# Patient Record
Sex: MALE | Race: WHITE | NOT HISPANIC OR LATINO | Employment: OTHER | URBAN - METROPOLITAN AREA
[De-identification: names, ages, dates, MRNs, and addresses within clinical notes are randomized per-mention and may not be internally consistent; named-entity substitution may affect disease eponyms.]

---

## 2018-11-26 ENCOUNTER — CONVERSION ENCOUNTER (OUTPATIENT)
Dept: PODIATRY | Facility: CLINIC | Age: 58
End: 2018-11-26

## 2018-11-26 ENCOUNTER — OFFICE VISIT CONVERTED (OUTPATIENT)
Dept: PODIATRY | Facility: CLINIC | Age: 58
End: 2018-11-26
Attending: PODIATRIST

## 2019-01-28 ENCOUNTER — PROCEDURE VISIT CONVERTED (OUTPATIENT)
Dept: PODIATRY | Facility: CLINIC | Age: 59
End: 2019-01-28
Attending: PODIATRIST

## 2019-02-11 ENCOUNTER — HOSPITAL ENCOUNTER (OUTPATIENT)
Dept: CT IMAGING | Facility: HOSPITAL | Age: 59
Discharge: HOME | End: 2019-02-11

## 2019-02-11 LAB
CREAT BLD-MCNC: 1.2 MG/DL (ref 0.6–1.4)
GFR SERPLBLD BASED ON 1.73 SQ M-ARVRAT: >60 ML/MIN/{1.73_M2}

## 2019-04-25 ENCOUNTER — HOSPITAL ENCOUNTER (OUTPATIENT)
Dept: CT IMAGING | Facility: HOSPITAL | Age: 59
Discharge: HOME | End: 2019-04-25

## 2019-04-25 LAB
CREAT BLD-MCNC: 0.9 MG/DL (ref 0.6–1.4)
GFR SERPLBLD BASED ON 1.73 SQ M-ARVRAT: >60 ML/MIN/{1.73_M2}

## 2019-04-29 ENCOUNTER — PROCEDURE VISIT CONVERTED (OUTPATIENT)
Dept: PODIATRY | Facility: CLINIC | Age: 59
End: 2019-04-29
Attending: PODIATRIST

## 2019-07-22 ENCOUNTER — PROCEDURE VISIT CONVERTED (OUTPATIENT)
Dept: PODIATRY | Facility: CLINIC | Age: 59
End: 2019-07-22
Attending: PODIATRIST

## 2019-11-11 ENCOUNTER — PROCEDURE VISIT CONVERTED (OUTPATIENT)
Dept: PODIATRY | Facility: CLINIC | Age: 59
End: 2019-11-11
Attending: PODIATRIST

## 2019-11-11 ENCOUNTER — CONVERSION ENCOUNTER (OUTPATIENT)
Dept: PODIATRY | Facility: CLINIC | Age: 59
End: 2019-11-11

## 2020-07-01 ENCOUNTER — PROCEDURE VISIT CONVERTED (OUTPATIENT)
Dept: PODIATRY | Facility: CLINIC | Age: 60
End: 2020-07-01
Attending: PODIATRIST

## 2020-11-10 ENCOUNTER — PROCEDURE VISIT CONVERTED (OUTPATIENT)
Dept: PODIATRY | Facility: CLINIC | Age: 60
End: 2020-11-10
Attending: PODIATRIST

## 2021-02-03 ENCOUNTER — HOSPITAL ENCOUNTER (OUTPATIENT)
Dept: OTHER | Facility: HOSPITAL | Age: 61
Discharge: HOME | End: 2021-02-03
Attending: FAMILY MEDICINE

## 2021-02-03 LAB
ALBUMIN SERPL-MCNC: 3.4 G/DL (ref 3.5–5)
ALBUMIN/GLOB SERPL: 0.9 {RATIO} (ref 1.4–2.6)
ALP SERPL-CCNC: 52 U/L (ref 56–119)
ALT SERPL-CCNC: 10 U/L (ref 10–40)
ANION GAP SERPL CALC-SCNC: 15 MMOL/L (ref 8–19)
AST SERPL-CCNC: 13 U/L (ref 15–50)
BASOPHILS # BLD AUTO: 0.03 10*3/UL (ref 0–0.2)
BASOPHILS NFR BLD AUTO: 0.4 % (ref 0–3)
BILIRUB SERPL-MCNC: 0.24 MG/DL (ref 0.2–1.3)
BUN SERPL-MCNC: 11 MG/DL (ref 5–25)
BUN/CREAT SERPL: 12 {RATIO} (ref 6–20)
CALCIUM SERPL-MCNC: 9.7 MG/DL (ref 8.7–10.4)
CHLORIDE SERPL-SCNC: 101 MMOL/L (ref 99–111)
CHOLEST SERPL-MCNC: 148 MG/DL (ref 107–200)
CHOLEST/HDLC SERPL: 4.6 {RATIO} (ref 3–6)
CONV ABS IMM GRAN: 0.04 10*3/UL (ref 0–0.2)
CONV CO2: 24 MMOL/L (ref 22–32)
CONV IMMATURE GRAN: 0.5 % (ref 0–1.8)
CONV TOTAL PROTEIN: 7 G/DL (ref 6.3–8.2)
CREAT UR-MCNC: 0.91 MG/DL (ref 0.7–1.2)
DEPRECATED RDW RBC AUTO: 54.9 FL (ref 35.1–43.9)
EOSINOPHIL # BLD AUTO: 0.2 10*3/UL (ref 0–0.7)
EOSINOPHIL # BLD AUTO: 2.7 % (ref 0–7)
ERYTHROCYTE [DISTWIDTH] IN BLOOD BY AUTOMATED COUNT: 16.2 % (ref 11.6–14.4)
EST. AVERAGE GLUCOSE BLD GHB EST-MCNC: 148 MG/DL
GFR SERPLBLD BASED ON 1.73 SQ M-ARVRAT: >60 ML/MIN/{1.73_M2}
GLOBULIN UR ELPH-MCNC: 3.6 G/DL (ref 2–3.5)
GLUCOSE SERPL-MCNC: 147 MG/DL (ref 70–99)
HBA1C MFR BLD: 6.8 % (ref 3.5–5.7)
HCT VFR BLD AUTO: 41.1 % (ref 42–52)
HDLC SERPL-MCNC: 32 MG/DL (ref 40–60)
HGB BLD-MCNC: 12.8 G/DL (ref 14–18)
LDLC SERPL CALC-MCNC: 99 MG/DL (ref 70–100)
LYMPHOCYTES # BLD AUTO: 0.71 10*3/UL (ref 1–5)
LYMPHOCYTES NFR BLD AUTO: 9.6 % (ref 20–45)
MCH RBC QN AUTO: 28.5 PG (ref 27–31)
MCHC RBC AUTO-ENTMCNC: 31.1 G/DL (ref 33–37)
MCV RBC AUTO: 91.5 FL (ref 80–96)
MONOCYTES # BLD AUTO: 0.4 10*3/UL (ref 0.2–1.2)
MONOCYTES NFR BLD AUTO: 5.4 % (ref 3–10)
NEUTROPHILS # BLD AUTO: 5.98 10*3/UL (ref 2–8)
NEUTROPHILS NFR BLD AUTO: 81.4 % (ref 30–85)
NRBC CBCN: 0 % (ref 0–0.7)
OSMOLALITY SERPL CALC.SUM OF ELEC: 284 MOSM/KG (ref 273–304)
PLATELET # BLD AUTO: 368 10*3/UL (ref 130–400)
PMV BLD AUTO: 10.1 FL (ref 9.4–12.4)
POTASSIUM SERPL-SCNC: 4.3 MMOL/L (ref 3.5–5.3)
RBC # BLD AUTO: 4.49 10*6/UL (ref 4.7–6.1)
SODIUM SERPL-SCNC: 136 MMOL/L (ref 135–147)
TRIGL SERPL-MCNC: 86 MG/DL (ref 40–150)
URATE SERPL-MCNC: 5 MG/DL (ref 3.5–8.5)
VLDLC SERPL-MCNC: 17 MG/DL (ref 5–37)
WBC # BLD AUTO: 7.36 10*3/UL (ref 4.8–10.8)

## 2021-02-05 LAB
AMPICILLIN SUSC ISLT: <=2
AMPICILLIN+SULBAC SUSC ISLT: <=2
BACTERIA SPEC AEROBE CULT: ABNORMAL
CEFAZOLIN SUSC ISLT: <=4
CEFEPIME SUSC ISLT: <=0.12
CEFTAZIDIME SUSC ISLT: <=1
CEFTRIAXONE SUSC ISLT: <=0.25
CIPROFLOXACIN SUSC ISLT: <=0.25
CIPROFLOXACIN SUSC ISLT: <=0.5
CLINDAMYCIN SUSC ISLT: 0.25
DAPTOMYCIN SUSC ISLT: 0.25
DOXYCYCLINE SUSC ISLT: <=0.5
ERTAPENEM SUSC ISLT: <=0.12
ERYTHROMYCIN SUSC ISLT: <=0.25
GENTAMICIN SUSC ISLT: <=0.5
GENTAMICIN SUSC ISLT: <=1
LEVOFLOXACIN SUSC ISLT: 0.25
LEVOFLOXACIN SUSC ISLT: <=0.12
OXACILLIN SUSC ISLT: 0.5
PIP+TAZO SUSC ISLT: <=4
RIFAMPIN SUSC ISLT: <=0.5
TETRACYCLINE SUSC ISLT: <=1
TIGECYCLINE SUSC ISLT: <=0.12
TMP SMX SUSC ISLT: <=10
TMP SMX SUSC ISLT: <=20
TOBRAMYCIN SUSC ISLT: <=1
VANCOMYCIN SUSC ISLT: 1

## 2021-05-13 NOTE — PROGRESS NOTES
Progress Note      Patient Name: Niall Mak   Patient ID: [id number]   Sex: Male   YOB: 1960    Primary Care Provider: Jb Booth MD   Referring Provider: Jb Booth MD    Visit Date: November 10, 2020    Provider: Des Ahumada DPM   Location: [facility]   Location Address: [address] [place] [address]  [id number]   Location Phone: [phone]          Chief Complaint  · Routine Foot Care Visit      History Of Present Illness  Niall Mak complains of painful, elongated toenails which are thickened, yellowed, chalky, and cause pain with shoe gear and ambulation.      New, Established, New Problem:  Established   Location:  Toenails  Duration:   Greater than five years  Onset:  Gradual  Nature:  Sore with palpation.  Stable, worsening, improving:   stable  Aggravating factors:  Pain with shoe gear and ambulation.  Previous Treatment:  Debridement    Patient denies any fevers, chills, nausea, vomiting, shortness of breath or any other constitutional signs nor symptoms.      Pt states their most recent blood glucose reading was 175.    Patient reports the following medical changes since their last visit:    - neuropathy changes in feet and hands.       Past Medical History  Arthritis; Atrial fibrillation; COPD (chronic obstructive pulmonary disease); Diabetes type 2, controlled; Foot pain, left; Foot pain, right; Gout; Ingrowing nail; Kidney problem; Morbid obesity; Numbness in feet; Tinea unguium; Ulcer Of Foot         Past Surgical History  *Denies any surgical procedures         Medication List  allopurinol 300 mg oral tablet; cetirizine 10 mg oral tablet; Cinnamon 500 mg oral capsule; diltiazem HCl 180 mg oral capsule,extended release 24 hr; gabapentin 800 mg oral tablet; glimepiride 4 mg oral tablet; metformin 500 mg oral tablet; metoprolol tartrate 100 mg oral tablet; potassium chloride 10 mEq oral capsule, extended release; spironolactone 25 mg oral  "tablet; Ventolin HFA 90 mcg/actuation inhalation HFA aerosol inhaler; Vitamin C 500 mg oral capsule, extended release; Xarelto 20 mg oral tablet         Allergy List  Januvia; [place]         Family Medical History  Heart Disease         Social History  Alcohol; Tobacco (Never)         Review of Systems  · Constitutional  o Denies  o : fatigue, night sweats  · Eyes  o Denies  o : double vision, blurred vision  · HENT  o Denies  o : vertigo, recent head injury  · Cardiovascular  o Denies  o : chest pain, irregular heart beats  · Respiratory  o Denies  o : shortness of breath, productive cough  · Gastrointestinal  o Denies  o : nausea, vomiting  · Genitourinary  o Denies  o : dysuria, urinary retention  · Integument  o * See HPI  · Neurologic  o Denies  o : altered mental status, seizures  · Musculoskeletal  o Denies  o : joint swelling, limitation of motion  · Endocrine  o Denies  o : cold intolerance, heat intolerance  · Heme-Lymph  o Denies  o : petechiae, lymph node enlargement or tenderness  · Allergic-Immunologic  o Denies  o : frequent illnesses      Vitals  Date Time BP Position Site L\R Cuff Size HR RR TEMP (F) WT  HT  BMI kg/m2 BSA m2 O2 Sat FR L/min FiO2 HC       11/10/2020 03:50 /78 Sitting    99 - R  96.2 528lbs 0oz 6'  6\" 61.02 3.63 95 %      11/10/2020 03:50 /71 Sitting                       Physical Examination  · Constitutional  o Appearance  o : morbidly obese, large body habitus, no obvious deformities present  · Eyes  o Vision  o : Patient wearing glasses.   · Respiratory  o Respiratory Effort  o : No labored breathing. Good respiratory effort.   · Cardiovascular  o Peripheral Vascular System  o :   § Pedal Pulses  § : Pedal Pulses are 2+ and symmetrical  § Extremities  § : There is no edema of the lower extremities  · Musculoskeletal  o Extremeties/Joint  o : Lower extremity muscle strength and range of motion is equal and symmetrical bilaterally. The knees are noted to be in normal " alignment. Ankle alignment and range of motion is normal and foot structure is normal.  · Skin and Subcutaneous Tissue  o General Inspection  o : no lesions present, no areas of discoloration, skin turgor normal, texture normal  · Neurologic  o Sensation  o : Sharp/dull sensation is diminished bilaterally. Monofilament sensation examination of the left foot is diminished. Monofilament sensation examination of the right foot is diminished.  · Toes  o Toes: Right Foot  o :   § Toenails  § : Toenails are hypertrophic, mycotic, dystrophic, brittle toenail(s) at nail 1, 2, 3, 4, 5 with onycholysis of the right foot. The 1st, 2nd, 3rd, 4th, 5th toenail(s) on the right have 2 mm in thickness with subungual detritus. There is an incurvated toenail at the distal border of the 1st, 2nd, 3rd, 4th, 5th toe  o Toes: Left Foot  o :   § Toenails  § : There are hypertrophic, mycotic, dystrophic, brittle toenail(s) at the 1, 2, 3, 4, 5 with onycholysis of the left foot. The 1st, 2nd, 3rd, 4th, 5th toenail(s) on the left have 2 mm in thickness with subungual detritus. There is an incurvated toenail at the distal border of the 1st, 2nd, 3rd, 4th, 5th toe  · Procedures  o Nail Debridement  o : Nail debridement is indicated for the following toenails:, left hallux, left 2nd toe, left 3rd toe, left 4th toe, left 5th toe, right hallux, right 2nd toe, right 3rd toe, right 4th toe, right 5th toe. The nail was debrided of excessive thickness to appropriate levels of comfort and contour using, nail nippers. The procedure was without complications          Assessment  · Foot pain, left     729.5/M79.672  · Foot pain, right     729.5/M79.671  · Ingrowing nail     703.0/L60.0  · Tinea unguium     110.1/B35.1  · Morbid obesity     278.01/E66.01  · Numbness in feet     782.0/R20.0      Plan  · Orders  o Debridement of six or more nails (96021, 82966, 87926, 19105, 96447, 82308) - 729.5/M79.672, 729.5/M79.671, 110.1/B35.1, 278.01/E66.01 -  11/10/2020  o Diabetic Foot (Motor and Sensory) Exam Completed [facility] (, , 2028F) - - 11/10/2020  · Medications  o Medications have been Reconciled  o Transition of Care or Provider Policy  · Instructions  o I have discussed the findings of this evaluation with the patient. The discussion included a complete verbal explanation of any changes in the examination results, diagnosis, and the current treatment plan. A schedule for future care needs was explained. If any questions should arise after returning home, I have encouraged the patient to feel free to contact Dr. Ahumada. The patient states understanding and agreement with this plan.   o Patient is to monitor for problems and to contact Dr. Ahumada for follow-up should such signs occur. Patient states understanding and agreement with this plan.   o Encouraged to follow-up with Primary Care Provider for preventative care.   o Follow up in 9 weeks for Routine Foot Care.   o Electronically Identified Patient Education Materials Provided Electronically  · Disposition  o Call or Return if symptoms worsen or persist.            Electronically Signed by: Des Ahumada DPM -Author on November 10, 2020 04:04:57 PM

## 2021-05-13 NOTE — PROGRESS NOTES
Progress Note      Patient Name: Niall Mak   Patient ID: [id number]   Sex: Male   YOB: 1960    Primary Care Provider: Jb Booth MD   Referring Provider: Jb Booth MD    Visit Date: July 1, 2020    Provider: Des Ahumada DPM   Location: [facility]   Location Address: [address] [place] [address]  [id number]   Location Phone: [phone]          Chief Complaint  · Routine Foot Care Visit      History Of Present Illness  Niall Mak complains of painful, elongated toenails which are thickened, yellowed, chalky, and cause pain with shoe gear and ambulation.      New, Established, New Problem:  Established   Location:  Toenails  Duration:   Greater than five years  Onset:  Gradual  Nature:  Sore with palpation.  Stable, worsening, improving:   stable  Aggravating factors:  Pain with shoe gear and ambulation.  Previous Treatment:  Debridement    Patient denies any fevers, chills, nausea, vomiting, shortness of breath or any other constitutional signs nor symptoms.      Patient reports the following medical changes since their last visit:    - lost 100 lbs in the past 19 months due to changes in diet.    Pt states their most recent HgB A1C was 7.1.       Past Medical History  Arthritis; Atrial fibrillation; COPD (chronic obstructive pulmonary disease); Diabetes type 2, controlled; Foot pain, left; Foot pain, right; Gout; Ingrowing nail; Kidney problem; Morbid obesity; Numbness in feet; Tinea unguium; Ulcer Of Foot         Past Surgical History  *Denies any surgical procedures         Medication List  allopurinol 300 mg oral tablet; cetirizine 10 mg oral tablet; Cinnamon 500 mg oral capsule; diltiazem HCl 180 mg oral capsule,extended release 24 hr; gabapentin 800 mg oral tablet; glimepiride 4 mg oral tablet; metformin 500 mg oral tablet; metoprolol tartrate 100 mg oral tablet; potassium chloride 10 mEq oral capsule, extended release;  "spironolactone 25 mg oral tablet; Ventolin HFA 90 mcg/actuation inhalation HFA aerosol inhaler; Vitamin C 500 mg oral capsule, extended release; Xarelto 20 mg oral tablet         Allergy List  Januvia; Strawberry       Allergies Reconciled  Family Medical History  Heart Disease         Social History  Alcohol; Tobacco (Never)         Review of Systems  · Constitutional  o Denies  o : fatigue, night sweats  · Eyes  o Denies  o : double vision, blurred vision  · HENT  o Denies  o : vertigo, recent head injury  · Cardiovascular  o Denies  o : chest pain, irregular heart beats  · Respiratory  o Denies  o : shortness of breath, productive cough  · Gastrointestinal  o Denies  o : nausea, vomiting  · Genitourinary  o Denies  o : dysuria, urinary retention  · Integument  o * See HPI  · Neurologic  o Denies  o : altered mental status, seizures  · Musculoskeletal  o Denies  o : joint swelling, limitation of motion  · Endocrine  o Denies  o : cold intolerance, heat intolerance  · Heme-Lymph  o Denies  o : petechiae, lymph node enlargement or tenderness  · Allergic-Immunologic  o Denies  o : frequent illnesses      Vitals  Date Time BP Position Site L\R Cuff Size HR RR TEMP (F) WT  HT  BMI kg/m2 BSA m2 O2 Sat        07/01/2020 03:38 /81 Sitting    92 - R  98 475lbs 0oz 6'  5\" 56.33 3.42 94 %          Physical Examination  · Constitutional  o Appearance  o : morbidly obese, large body habitus, no obvious deformities present  · Eyes  o Vision  o : Patient wearing glasses.   · Respiratory  o Respiratory Effort  o : No labored breathing. Good respiratory effort.   · Cardiovascular  o Peripheral Vascular System  o :   § Pedal Pulses  § : Pedal Pulses are 2+ and symmetrical  § Extremities  § : There is no edema of the lower extremities  · Musculoskeletal  o Extremeties/Joint  o : Lower extremity muscle strength and range of motion is equal and symmetrical bilaterally. The knees are noted to be in normal alignment. Ankle " alignment and range of motion is normal and foot structure is normal.  · Skin and Subcutaneous Tissue  o General Inspection  o : no lesions present, no areas of discoloration, skin turgor normal, texture normal  · Neurologic  o Sensation  o : Sharp/dull sensation is within normal limits. [place]-Marie 5.07 monofilament intact to all assessed areas.   · Toes  o Toes: Right Foot  o :   § Toenails  § : Toenails are hypertrophic, mycotic, dystrophic, brittle toenail(s) at nail 1, 2, 3, 4, 5 with onycholysis of the right foot. The 1st, 2nd, 3rd, 4th, 5th toenail(s) on the right have 2 mm in thickness with subungual detritus. There is an incurvated toenail at the distal border of the 1st, 2nd, 3rd, 4th, 5th toe  o Toes: Left Foot  o :   § Toenails  § : There are hypertrophic, mycotic, dystrophic, brittle toenail(s) at the 1, 2, 3, 4, 5 with onycholysis of the left foot. The 1st, 2nd, 3rd, 4th, 5th toenail(s) on the left have 2 mm in thickness with subungual detritus. There is an incurvated toenail at the distal border of the 1st, 2nd, 3rd, 4th, 5th toe  · Procedures  o Nail Debridement  o : Nail debridement is indicated for the following toenails:, left hallux, left 2nd toe, left 3rd toe, left 4th toe, left 5th toe, right hallux, right 2nd toe, right 3rd toe, right 4th toe, right 5th toe. The nail was debrided of excessive thickness to appropriate levels of comfort and contour using, nail nippers. The procedure was without complications          Assessment  · Foot pain, left     729.5/M79.672  · Foot pain, right     729.5/M79.671  · Ingrowing nail     703.0/L60.0  · Tinea unguium     110.1/B35.1  · Morbid obesity     278.01/E66.01      Plan  · Orders  o Debridement of six or more nails (00014, 33090, 28809, 61518, 61986) - 729.5/M79.672, 729.5/M79.671, 110.1/B35.1, 278.01/E66.01 - 07/01/2020  o Diabetic Foot (Motor and Sensory) Exam Completed [facility] (, , 2028F) - - 07/01/2020  · Medications  o Medications have  been Reconciled  o Transition of Care or Provider Policy  · Instructions  o I have discussed the findings of this evaluation with the patient. The discussion included a complete verbal explanation of any changes in the examination results, diagnosis, and the current treatment plan. A schedule for future care needs was explained. If any questions should arise after returning home, I have encouraged the patient to feel free to contact Dr. Ahumada. The patient states understanding and agreement with this plan.   o Patient is to monitor for problems and to contact Dr. Ahumada for follow-up should such signs occur. Patient states understanding and agreement with this plan.   o Encouraged to follow-up with Primary Care Provider for preventative care.   o Follow up in 9 weeks for Routine Foot Care.   o Electronically Identified Patient Education Materials Provided Electronically  · Disposition  o Call or Return if symptoms worsen or persist.            Electronically Signed by: Des Ahumada DPM -Author on July 1, 2020 04:05:18 PM

## 2021-05-14 VITALS
WEIGHT: 315 LBS | BODY MASS INDEX: 36.45 KG/M2 | HEIGHT: 78 IN | OXYGEN SATURATION: 95 % | TEMPERATURE: 96.2 F | SYSTOLIC BLOOD PRESSURE: 158 MMHG | DIASTOLIC BLOOD PRESSURE: 78 MMHG | HEART RATE: 99 BPM

## 2021-05-15 VITALS
OXYGEN SATURATION: 96 % | WEIGHT: 315 LBS | HEART RATE: 73 BPM | HEIGHT: 77 IN | SYSTOLIC BLOOD PRESSURE: 138 MMHG | DIASTOLIC BLOOD PRESSURE: 90 MMHG | BODY MASS INDEX: 37.19 KG/M2

## 2021-05-15 VITALS
TEMPERATURE: 98 F | HEART RATE: 92 BPM | HEIGHT: 77 IN | OXYGEN SATURATION: 94 % | WEIGHT: 315 LBS | BODY MASS INDEX: 37.19 KG/M2 | SYSTOLIC BLOOD PRESSURE: 150 MMHG | DIASTOLIC BLOOD PRESSURE: 81 MMHG

## 2021-05-15 VITALS
WEIGHT: 315 LBS | SYSTOLIC BLOOD PRESSURE: 151 MMHG | OXYGEN SATURATION: 99 % | BODY MASS INDEX: 37.19 KG/M2 | HEART RATE: 105 BPM | DIASTOLIC BLOOD PRESSURE: 79 MMHG | HEIGHT: 77 IN

## 2021-05-15 VITALS
HEART RATE: 84 BPM | DIASTOLIC BLOOD PRESSURE: 64 MMHG | BODY MASS INDEX: 37.19 KG/M2 | SYSTOLIC BLOOD PRESSURE: 142 MMHG | OXYGEN SATURATION: 96 % | HEIGHT: 77 IN | WEIGHT: 315 LBS

## 2021-05-16 VITALS
SYSTOLIC BLOOD PRESSURE: 142 MMHG | HEIGHT: 77 IN | BODY MASS INDEX: 37.19 KG/M2 | DIASTOLIC BLOOD PRESSURE: 79 MMHG | HEART RATE: 82 BPM | OXYGEN SATURATION: 95 % | WEIGHT: 315 LBS

## 2021-05-16 VITALS
DIASTOLIC BLOOD PRESSURE: 71 MMHG | BODY MASS INDEX: 37.19 KG/M2 | WEIGHT: 315 LBS | HEART RATE: 84 BPM | SYSTOLIC BLOOD PRESSURE: 142 MMHG | OXYGEN SATURATION: 96 % | HEIGHT: 77 IN

## 2021-07-25 ENCOUNTER — HOSPITAL ENCOUNTER (INPATIENT)
Facility: HOSPITAL | Age: 61
LOS: 6 days | Discharge: HOME HEALTH | End: 2021-08-01
Attending: EMERGENCY MEDICINE | Admitting: INTERNAL MEDICINE

## 2021-07-25 ENCOUNTER — APPOINTMENT (OUTPATIENT)
Dept: GENERAL RADIOLOGY | Facility: HOSPITAL | Age: 61
End: 2021-07-25

## 2021-07-25 DIAGNOSIS — E11.65 TYPE 2 DIABETES MELLITUS WITH HYPERGLYCEMIA, WITHOUT LONG-TERM CURRENT USE OF INSULIN (HCC): ICD-10-CM

## 2021-07-25 DIAGNOSIS — J96.01 ACUTE RESPIRATORY FAILURE WITH HYPOXIA (HCC): ICD-10-CM

## 2021-07-25 DIAGNOSIS — R26.2 DIFFICULTY WALKING: ICD-10-CM

## 2021-07-25 DIAGNOSIS — Z78.9 DECREASED ACTIVITIES OF DAILY LIVING (ADL): ICD-10-CM

## 2021-07-25 DIAGNOSIS — J18.9 PNEUMONIA DUE TO INFECTIOUS ORGANISM, UNSPECIFIED LATERALITY, UNSPECIFIED PART OF LUNG: ICD-10-CM

## 2021-07-25 DIAGNOSIS — A41.9 SEPSIS, DUE TO UNSPECIFIED ORGANISM, UNSPECIFIED WHETHER ACUTE ORGAN DYSFUNCTION PRESENT (HCC): Primary | ICD-10-CM

## 2021-07-25 LAB
ALBUMIN SERPL-MCNC: 3.9 G/DL (ref 3.5–5.2)
ALBUMIN/GLOB SERPL: 1.3 G/DL
ALP SERPL-CCNC: 55 U/L (ref 39–117)
ALT SERPL W P-5'-P-CCNC: 14 U/L (ref 1–41)
ANION GAP SERPL CALCULATED.3IONS-SCNC: 14.3 MMOL/L (ref 5–15)
APTT PPP: 31.4 SECONDS (ref 22.2–34.2)
ARTERIAL PATENCY WRIST A: POSITIVE
AST SERPL-CCNC: 21 U/L (ref 1–40)
BASE EXCESS BLDA CALC-SCNC: 0.7 MMOL/L (ref -2–2)
BASOPHILS # BLD AUTO: 0.03 10*3/MM3 (ref 0–0.2)
BASOPHILS NFR BLD AUTO: 0.2 % (ref 0–1.5)
BDY SITE: ABNORMAL
BILIRUB SERPL-MCNC: 0.5 MG/DL (ref 0–1.2)
BUN SERPL-MCNC: 15 MG/DL (ref 8–23)
BUN/CREAT SERPL: 13.6 (ref 7–25)
CALCIUM SPEC-SCNC: 9.7 MG/DL (ref 8.6–10.5)
CHLORIDE SERPL-SCNC: 100 MMOL/L (ref 98–107)
CO2 SERPL-SCNC: 24.7 MMOL/L (ref 22–29)
COHGB MFR BLD: 1 % (ref 0–1.5)
CREAT SERPL-MCNC: 1.1 MG/DL (ref 0.76–1.27)
CRP SERPL-MCNC: 2.99 MG/DL (ref 0–0.5)
D-LACTATE SERPL-SCNC: 3.7 MMOL/L (ref 0.5–2)
DEPRECATED RDW RBC AUTO: 53.5 FL (ref 37–54)
DIGOXIN SERPL-MCNC: 0.31 NG/ML (ref 0.6–1.2)
EOSINOPHIL # BLD AUTO: 0.11 10*3/MM3 (ref 0–0.4)
EOSINOPHIL NFR BLD AUTO: 0.8 % (ref 0.3–6.2)
ERYTHROCYTE [DISTWIDTH] IN BLOOD BY AUTOMATED COUNT: 15.8 % (ref 12.3–15.4)
FHHB: 6.1 % (ref 0–5)
GAS FLOW AIRWAY: 4 LPM
GFR SERPL CREATININE-BSD FRML MDRD: 68 ML/MIN/1.73
GLOBULIN UR ELPH-MCNC: 3 GM/DL
GLUCOSE SERPL-MCNC: 198 MG/DL (ref 65–99)
HCO3 BLDA-SCNC: 24.8 MMOL/L (ref 22–26)
HCT VFR BLD AUTO: 40.5 % (ref 37.5–51)
HGB BLD-MCNC: 12.9 G/DL (ref 13–17.7)
HGB BLDA-MCNC: 13.8 G/DL (ref 13.8–16.4)
HOLD SPECIMEN: NORMAL
HOLD SPECIMEN: NORMAL
IMM GRANULOCYTES # BLD AUTO: 0.06 10*3/MM3 (ref 0–0.05)
IMM GRANULOCYTES NFR BLD AUTO: 0.4 % (ref 0–0.5)
INHALED O2 CONCENTRATION: 36 %
INR PPP: 1.1 (ref 2–3)
LACTATE BLDA-SCNC: ABNORMAL MMOL/L
LYMPHOCYTES # BLD AUTO: 0.68 10*3/MM3 (ref 0.7–3.1)
LYMPHOCYTES NFR BLD AUTO: 4.9 % (ref 19.6–45.3)
MAGNESIUM SERPL-MCNC: 1.5 MG/DL (ref 1.6–2.4)
MCH RBC QN AUTO: 29.4 PG (ref 26.6–33)
MCHC RBC AUTO-ENTMCNC: 31.9 G/DL (ref 31.5–35.7)
MCV RBC AUTO: 92.3 FL (ref 79–97)
METHGB BLD QL: 0.2 % (ref 0–1.5)
MODALITY: ABNORMAL
MONOCYTES # BLD AUTO: 0.55 10*3/MM3 (ref 0.1–0.9)
MONOCYTES NFR BLD AUTO: 4 % (ref 5–12)
NEUTROPHILS NFR BLD AUTO: 12.39 10*3/MM3 (ref 1.7–7)
NEUTROPHILS NFR BLD AUTO: 89.7 % (ref 42.7–76)
NRBC BLD AUTO-RTO: 0 /100 WBC (ref 0–0.2)
OXYHGB MFR BLDV: 92.7 % (ref 94–99)
PCO2 BLDA: 38.2 MM HG (ref 35–45)
PH BLDA: 7.43 PH UNITS (ref 7.35–7.45)
PHOSPHATE SERPL-MCNC: 2.9 MG/DL (ref 2.5–4.5)
PLATELET # BLD AUTO: 276 10*3/MM3 (ref 140–450)
PMV BLD AUTO: 10.3 FL (ref 6–12)
PO2 BLD: 194 MM[HG] (ref 0–500)
PO2 BLDA: 70 MM HG (ref 80–100)
POTASSIUM SERPL-SCNC: 4.7 MMOL/L (ref 3.5–5.2)
PROT SERPL-MCNC: 6.9 G/DL (ref 6–8.5)
PROTHROMBIN TIME: 12 SECONDS (ref 9.4–12)
RBC # BLD AUTO: 4.39 10*6/MM3 (ref 4.14–5.8)
SAO2 % BLDCOA: 93.8 % (ref 95–99)
SODIUM SERPL-SCNC: 139 MMOL/L (ref 136–145)
WBC # BLD AUTO: 13.82 10*3/MM3 (ref 3.4–10.8)
WHOLE BLOOD HOLD SPECIMEN: NORMAL

## 2021-07-25 PROCEDURE — 84100 ASSAY OF PHOSPHORUS: CPT | Performed by: EMERGENCY MEDICINE

## 2021-07-25 PROCEDURE — 93010 ELECTROCARDIOGRAM REPORT: CPT | Performed by: INTERNAL MEDICINE

## 2021-07-25 PROCEDURE — 87077 CULTURE AEROBIC IDENTIFY: CPT | Performed by: EMERGENCY MEDICINE

## 2021-07-25 PROCEDURE — 85610 PROTHROMBIN TIME: CPT | Performed by: EMERGENCY MEDICINE

## 2021-07-25 PROCEDURE — 87804 INFLUENZA ASSAY W/OPTIC: CPT | Performed by: EMERGENCY MEDICINE

## 2021-07-25 PROCEDURE — 71045 X-RAY EXAM CHEST 1 VIEW: CPT

## 2021-07-25 PROCEDURE — 82375 ASSAY CARBOXYHB QUANT: CPT | Performed by: EMERGENCY MEDICINE

## 2021-07-25 PROCEDURE — U0005 INFEC AGEN DETEC AMPLI PROBE: HCPCS | Performed by: EMERGENCY MEDICINE

## 2021-07-25 PROCEDURE — 85730 THROMBOPLASTIN TIME PARTIAL: CPT | Performed by: EMERGENCY MEDICINE

## 2021-07-25 PROCEDURE — 83735 ASSAY OF MAGNESIUM: CPT | Performed by: EMERGENCY MEDICINE

## 2021-07-25 PROCEDURE — 82805 BLOOD GASES W/O2 SATURATION: CPT | Performed by: EMERGENCY MEDICINE

## 2021-07-25 PROCEDURE — 87150 DNA/RNA AMPLIFIED PROBE: CPT | Performed by: EMERGENCY MEDICINE

## 2021-07-25 PROCEDURE — 87040 BLOOD CULTURE FOR BACTERIA: CPT | Performed by: EMERGENCY MEDICINE

## 2021-07-25 PROCEDURE — 86140 C-REACTIVE PROTEIN: CPT | Performed by: EMERGENCY MEDICINE

## 2021-07-25 PROCEDURE — 36600 WITHDRAWAL OF ARTERIAL BLOOD: CPT | Performed by: EMERGENCY MEDICINE

## 2021-07-25 PROCEDURE — 85025 COMPLETE CBC W/AUTO DIFF WBC: CPT | Performed by: EMERGENCY MEDICINE

## 2021-07-25 PROCEDURE — 25010000002 CEFEPIME PER 500 MG: Performed by: EMERGENCY MEDICINE

## 2021-07-25 PROCEDURE — 83050 HGB METHEMOGLOBIN QUAN: CPT | Performed by: EMERGENCY MEDICINE

## 2021-07-25 PROCEDURE — 87186 SC STD MICRODIL/AGAR DIL: CPT | Performed by: EMERGENCY MEDICINE

## 2021-07-25 PROCEDURE — 80053 COMPREHEN METABOLIC PANEL: CPT | Performed by: EMERGENCY MEDICINE

## 2021-07-25 PROCEDURE — U0003 INFECTIOUS AGENT DETECTION BY NUCLEIC ACID (DNA OR RNA); SEVERE ACUTE RESPIRATORY SYNDROME CORONAVIRUS 2 (SARS-COV-2) (CORONAVIRUS DISEASE [COVID-19]), AMPLIFIED PROBE TECHNIQUE, MAKING USE OF HIGH THROUGHPUT TECHNOLOGIES AS DESCRIBED BY CMS-2020-01-R: HCPCS | Performed by: EMERGENCY MEDICINE

## 2021-07-25 PROCEDURE — 93005 ELECTROCARDIOGRAM TRACING: CPT | Performed by: EMERGENCY MEDICINE

## 2021-07-25 PROCEDURE — 99285 EMERGENCY DEPT VISIT HI MDM: CPT

## 2021-07-25 PROCEDURE — 83605 ASSAY OF LACTIC ACID: CPT | Performed by: EMERGENCY MEDICINE

## 2021-07-25 PROCEDURE — 25010000002 VANCOMYCIN 5 G RECONSTITUTED SOLUTION: Performed by: EMERGENCY MEDICINE

## 2021-07-25 PROCEDURE — 80162 ASSAY OF DIGOXIN TOTAL: CPT | Performed by: EMERGENCY MEDICINE

## 2021-07-25 RX ORDER — DIGOXIN 250 MCG
250 TABLET ORAL
COMMUNITY
End: 2021-08-01 | Stop reason: HOSPADM

## 2021-07-25 RX ORDER — DILTIAZEM HCL IN NACL,ISO-OSM 125 MG/125
5-15 PLASTIC BAG, INJECTION (ML) INTRAVENOUS
Status: DISCONTINUED | OUTPATIENT
Start: 2021-07-25 | End: 2021-08-01

## 2021-07-25 RX ORDER — CIPROFLOXACIN 500 MG/1
500 TABLET, FILM COATED ORAL 2 TIMES DAILY
Status: ON HOLD | COMMUNITY
End: 2021-07-26

## 2021-07-25 RX ORDER — METOPROLOL SUCCINATE 100 MG/1
100 TABLET, EXTENDED RELEASE ORAL DAILY
COMMUNITY
End: 2022-12-27 | Stop reason: ALTCHOICE

## 2021-07-25 RX ORDER — GABAPENTIN 800 MG/1
800 TABLET ORAL 3 TIMES DAILY
COMMUNITY
End: 2022-12-27 | Stop reason: ALTCHOICE

## 2021-07-25 RX ORDER — SODIUM CHLORIDE 0.9 % (FLUSH) 0.9 %
10 SYRINGE (ML) INJECTION AS NEEDED
Status: DISCONTINUED | OUTPATIENT
Start: 2021-07-25 | End: 2021-08-01 | Stop reason: HOSPADM

## 2021-07-25 RX ORDER — ALLOPURINOL 300 MG/1
300 TABLET ORAL DAILY
COMMUNITY

## 2021-07-25 RX ORDER — GLIMEPIRIDE 4 MG/1
4 TABLET ORAL
COMMUNITY
End: 2022-12-27 | Stop reason: ALTCHOICE

## 2021-07-25 RX ORDER — ACETAMINOPHEN 325 MG/1
975 TABLET ORAL ONCE
Status: COMPLETED | OUTPATIENT
Start: 2021-07-25 | End: 2021-07-25

## 2021-07-25 RX ORDER — DILTIAZEM HYDROCHLORIDE 180 MG/1
180 CAPSULE, COATED, EXTENDED RELEASE ORAL DAILY
COMMUNITY

## 2021-07-25 RX ORDER — SPIRONOLACTONE 25 MG/1
25 TABLET ORAL DAILY
COMMUNITY

## 2021-07-25 RX ORDER — DILTIAZEM HYDROCHLORIDE 5 MG/ML
10 INJECTION INTRAVENOUS ONCE
Status: COMPLETED | OUTPATIENT
Start: 2021-07-25 | End: 2021-07-25

## 2021-07-25 RX ORDER — MAGNESIUM SULFATE 1 G/100ML
1 INJECTION INTRAVENOUS ONCE
Status: COMPLETED | OUTPATIENT
Start: 2021-07-26 | End: 2021-07-26

## 2021-07-25 RX ADMIN — SODIUM CHLORIDE, POTASSIUM CHLORIDE, SODIUM LACTATE AND CALCIUM CHLORIDE 1000 ML: 600; 310; 30; 20 INJECTION, SOLUTION INTRAVENOUS at 22:05

## 2021-07-25 RX ADMIN — ACETAMINOPHEN 975 MG: 325 TABLET ORAL at 22:57

## 2021-07-25 RX ADMIN — CEFEPIME HYDROCHLORIDE 2 G: 2 INJECTION, POWDER, FOR SOLUTION INTRAVENOUS at 22:05

## 2021-07-25 RX ADMIN — Medication 5 MG/HR: at 22:45

## 2021-07-25 RX ADMIN — DILTIAZEM HYDROCHLORIDE 10 MG: 5 INJECTION INTRAVENOUS at 22:40

## 2021-07-25 RX ADMIN — VANCOMYCIN HYDROCHLORIDE 3000 MG: 5 INJECTION, POWDER, LYOPHILIZED, FOR SOLUTION INTRAVENOUS at 23:01

## 2021-07-26 PROBLEM — A41.9 SEPSIS: Status: ACTIVE | Noted: 2021-07-26

## 2021-07-26 LAB
ANION GAP SERPL CALCULATED.3IONS-SCNC: 10.4 MMOL/L (ref 5–15)
BACTERIA UR QL AUTO: ABNORMAL /HPF
BILIRUB UR QL STRIP: ABNORMAL
BUN SERPL-MCNC: 20 MG/DL (ref 8–23)
BUN/CREAT SERPL: 16.1 (ref 7–25)
CALCIUM SPEC-SCNC: 9.3 MG/DL (ref 8.6–10.5)
CHLORIDE SERPL-SCNC: 100 MMOL/L (ref 98–107)
CLARITY UR: ABNORMAL
CO2 SERPL-SCNC: 21.6 MMOL/L (ref 22–29)
COLOR UR: ABNORMAL
CREAT SERPL-MCNC: 1.24 MG/DL (ref 0.76–1.27)
D-LACTATE SERPL-SCNC: 4 MMOL/L (ref 0.5–2)
DEPRECATED RDW RBC AUTO: 55.8 FL (ref 37–54)
ERYTHROCYTE [DISTWIDTH] IN BLOOD BY AUTOMATED COUNT: 16.1 % (ref 12.3–15.4)
FLUAV AG NPH QL: NEGATIVE
FLUBV AG NPH QL IA: NEGATIVE
GFR SERPL CREATININE-BSD FRML MDRD: 59 ML/MIN/1.73
GLUCOSE BLDC GLUCOMTR-MCNC: 169 MG/DL (ref 70–99)
GLUCOSE BLDC GLUCOMTR-MCNC: 174 MG/DL (ref 70–99)
GLUCOSE BLDC GLUCOMTR-MCNC: 214 MG/DL (ref 70–99)
GLUCOSE BLDC GLUCOMTR-MCNC: 244 MG/DL (ref 70–99)
GLUCOSE SERPL-MCNC: 255 MG/DL (ref 65–99)
GLUCOSE UR STRIP-MCNC: NEGATIVE MG/DL
HCT VFR BLD AUTO: 39.6 % (ref 37.5–51)
HGB BLD-MCNC: 12.4 G/DL (ref 13–17.7)
HGB UR QL STRIP.AUTO: NEGATIVE
HYALINE CASTS UR QL AUTO: ABNORMAL /LPF
KETONES UR QL STRIP: ABNORMAL
LEUKOCYTE ESTERASE UR QL STRIP.AUTO: NEGATIVE
MCH RBC QN AUTO: 29.7 PG (ref 26.6–33)
MCHC RBC AUTO-ENTMCNC: 31.3 G/DL (ref 31.5–35.7)
MCV RBC AUTO: 95 FL (ref 79–97)
NITRITE UR QL STRIP: NEGATIVE
NT-PROBNP SERPL-MCNC: 1508 PG/ML (ref 0–900)
PH UR STRIP.AUTO: <=5 [PH] (ref 5–8)
PLATELET # BLD AUTO: 260 10*3/MM3 (ref 140–450)
PMV BLD AUTO: 10.8 FL (ref 6–12)
POTASSIUM SERPL-SCNC: 4.4 MMOL/L (ref 3.5–5.2)
PROCALCITONIN SERPL-MCNC: 14.95 NG/ML (ref 0–0.25)
PROT UR QL STRIP: ABNORMAL
QT INTERVAL: 237 MS
RBC # BLD AUTO: 4.17 10*6/MM3 (ref 4.14–5.8)
RBC # UR: ABNORMAL /HPF
REF LAB TEST METHOD: ABNORMAL
SARS-COV-2 RNA RESP QL NAA+PROBE: NOT DETECTED
SODIUM SERPL-SCNC: 132 MMOL/L (ref 136–145)
SP GR UR STRIP: >1.03 (ref 1–1.03)
SQUAMOUS #/AREA URNS HPF: ABNORMAL /HPF
UROBILINOGEN UR QL STRIP: ABNORMAL
WBC # BLD AUTO: 24.37 10*3/MM3 (ref 3.4–10.8)
WBC UR QL AUTO: ABNORMAL /HPF

## 2021-07-26 PROCEDURE — 99223 1ST HOSP IP/OBS HIGH 75: CPT | Performed by: PHYSICIAN ASSISTANT

## 2021-07-26 PROCEDURE — 83605 ASSAY OF LACTIC ACID: CPT | Performed by: EMERGENCY MEDICINE

## 2021-07-26 PROCEDURE — 84145 PROCALCITONIN (PCT): CPT | Performed by: PHYSICIAN ASSISTANT

## 2021-07-26 PROCEDURE — 25010000002 VANCOMYCIN 5 G RECONSTITUTED SOLUTION: Performed by: HOSPITALIST

## 2021-07-26 PROCEDURE — 36415 COLL VENOUS BLD VENIPUNCTURE: CPT | Performed by: PHYSICIAN ASSISTANT

## 2021-07-26 PROCEDURE — 25010000002 MAGNESIUM SULFATE IN D5W 1G/100ML (PREMIX) 1-5 GM/100ML-% SOLUTION: Performed by: FAMILY MEDICINE

## 2021-07-26 PROCEDURE — 85027 COMPLETE CBC AUTOMATED: CPT | Performed by: PHYSICIAN ASSISTANT

## 2021-07-26 PROCEDURE — 80048 BASIC METABOLIC PNL TOTAL CA: CPT | Performed by: PHYSICIAN ASSISTANT

## 2021-07-26 PROCEDURE — 81001 URINALYSIS AUTO W/SCOPE: CPT | Performed by: EMERGENCY MEDICINE

## 2021-07-26 PROCEDURE — 25010000002 MAGNESIUM SULFATE IN D5W 1G/100ML (PREMIX) 1-5 GM/100ML-% SOLUTION: Performed by: EMERGENCY MEDICINE

## 2021-07-26 PROCEDURE — 25010000002 CEFEPIME PER 500 MG: Performed by: HOSPITALIST

## 2021-07-26 PROCEDURE — 82962 GLUCOSE BLOOD TEST: CPT

## 2021-07-26 PROCEDURE — 83880 ASSAY OF NATRIURETIC PEPTIDE: CPT | Performed by: PHYSICIAN ASSISTANT

## 2021-07-26 PROCEDURE — 63710000001 INSULIN LISPRO (HUMAN) PER 5 UNITS: Performed by: PHYSICIAN ASSISTANT

## 2021-07-26 RX ORDER — BISACODYL 5 MG/1
5 TABLET, DELAYED RELEASE ORAL DAILY PRN
Status: DISCONTINUED | OUTPATIENT
Start: 2021-07-26 | End: 2021-08-01 | Stop reason: HOSPADM

## 2021-07-26 RX ORDER — BISACODYL 10 MG
10 SUPPOSITORY, RECTAL RECTAL DAILY PRN
Status: DISCONTINUED | OUTPATIENT
Start: 2021-07-26 | End: 2021-08-01 | Stop reason: HOSPADM

## 2021-07-26 RX ORDER — ACETAMINOPHEN 325 MG/1
650 TABLET ORAL EVERY 4 HOURS PRN
Status: DISCONTINUED | OUTPATIENT
Start: 2021-07-26 | End: 2021-08-01 | Stop reason: HOSPADM

## 2021-07-26 RX ORDER — AMOXICILLIN 250 MG
2 CAPSULE ORAL 2 TIMES DAILY
Status: DISCONTINUED | OUTPATIENT
Start: 2021-07-26 | End: 2021-08-01 | Stop reason: HOSPADM

## 2021-07-26 RX ORDER — SODIUM CHLORIDE 0.9 % (FLUSH) 0.9 %
10 SYRINGE (ML) INJECTION EVERY 12 HOURS SCHEDULED
Status: DISCONTINUED | OUTPATIENT
Start: 2021-07-26 | End: 2021-08-01 | Stop reason: HOSPADM

## 2021-07-26 RX ORDER — POLYETHYLENE GLYCOL 3350 17 G/17G
17 POWDER, FOR SOLUTION ORAL DAILY PRN
Status: DISCONTINUED | OUTPATIENT
Start: 2021-07-26 | End: 2021-08-01 | Stop reason: HOSPADM

## 2021-07-26 RX ORDER — SODIUM CHLORIDE 0.9 % (FLUSH) 0.9 %
10 SYRINGE (ML) INJECTION AS NEEDED
Status: DISCONTINUED | OUTPATIENT
Start: 2021-07-26 | End: 2021-08-01 | Stop reason: HOSPADM

## 2021-07-26 RX ORDER — ACETAMINOPHEN 160 MG/5ML
650 SOLUTION ORAL EVERY 4 HOURS PRN
Status: DISCONTINUED | OUTPATIENT
Start: 2021-07-26 | End: 2021-08-01 | Stop reason: HOSPADM

## 2021-07-26 RX ORDER — DILTIAZEM HCL IN NACL,ISO-OSM 125 MG/125
5-15 PLASTIC BAG, INJECTION (ML) INTRAVENOUS
Status: DISCONTINUED | OUTPATIENT
Start: 2021-07-26 | End: 2021-07-26

## 2021-07-26 RX ORDER — DIGOXIN 0.25 MG/ML
250 INJECTION INTRAMUSCULAR; INTRAVENOUS ONCE
Status: COMPLETED | OUTPATIENT
Start: 2021-07-26 | End: 2021-07-28

## 2021-07-26 RX ORDER — NICOTINE POLACRILEX 4 MG
15 LOZENGE BUCCAL
Status: DISCONTINUED | OUTPATIENT
Start: 2021-07-26 | End: 2021-08-01 | Stop reason: HOSPADM

## 2021-07-26 RX ORDER — MAGNESIUM SULFATE 1 G/100ML
1 INJECTION INTRAVENOUS
Status: COMPLETED | OUTPATIENT
Start: 2021-07-26 | End: 2021-07-26

## 2021-07-26 RX ORDER — GABAPENTIN 400 MG/1
800 CAPSULE ORAL EVERY 8 HOURS SCHEDULED
Status: DISCONTINUED | OUTPATIENT
Start: 2021-07-26 | End: 2021-08-01 | Stop reason: HOSPADM

## 2021-07-26 RX ORDER — AMPICILLIN TRIHYDRATE 250 MG
500 CAPSULE ORAL 2 TIMES DAILY
COMMUNITY

## 2021-07-26 RX ORDER — DEXTROSE MONOHYDRATE 100 MG/ML
25 INJECTION, SOLUTION INTRAVENOUS
Status: DISCONTINUED | OUTPATIENT
Start: 2021-07-26 | End: 2021-08-01 | Stop reason: HOSPADM

## 2021-07-26 RX ORDER — VITAMIN B COMPLEX
2500 TABLET ORAL DAILY
COMMUNITY
End: 2022-12-27 | Stop reason: SDUPTHER

## 2021-07-26 RX ORDER — ACETAMINOPHEN 650 MG/1
650 SUPPOSITORY RECTAL EVERY 4 HOURS PRN
Status: DISCONTINUED | OUTPATIENT
Start: 2021-07-26 | End: 2021-08-01 | Stop reason: HOSPADM

## 2021-07-26 RX ADMIN — VANCOMYCIN HYDROCHLORIDE 1500 MG: 5 INJECTION, POWDER, LYOPHILIZED, FOR SOLUTION INTRAVENOUS at 23:23

## 2021-07-26 RX ADMIN — METOPROLOL TARTRATE 5 MG: 1 INJECTION, SOLUTION INTRAVENOUS at 03:40

## 2021-07-26 RX ADMIN — Medication 15 MG/HR: at 06:08

## 2021-07-26 RX ADMIN — ACETAMINOPHEN 650 MG: 325 TABLET ORAL at 07:55

## 2021-07-26 RX ADMIN — INSULIN LISPRO 4 UNITS: 100 INJECTION, SOLUTION INTRAVENOUS; SUBCUTANEOUS at 08:08

## 2021-07-26 RX ADMIN — CEFEPIME HYDROCHLORIDE 2 G: 2 INJECTION, POWDER, FOR SOLUTION INTRAVENOUS at 21:42

## 2021-07-26 RX ADMIN — MAGNESIUM SULFATE 1 G: 1 INJECTION INTRAVENOUS at 04:43

## 2021-07-26 RX ADMIN — GABAPENTIN 800 MG: 400 CAPSULE ORAL at 21:42

## 2021-07-26 RX ADMIN — CEFEPIME HYDROCHLORIDE 2 G: 2 INJECTION, POWDER, FOR SOLUTION INTRAVENOUS at 05:30

## 2021-07-26 RX ADMIN — MAGNESIUM SULFATE 1 G: 1 INJECTION INTRAVENOUS at 11:46

## 2021-07-26 RX ADMIN — GABAPENTIN 800 MG: 400 CAPSULE ORAL at 16:22

## 2021-07-26 RX ADMIN — DOCUSATE SODIUM 50MG AND SENNOSIDES 8.6MG 2 TABLET: 8.6; 5 TABLET, FILM COATED ORAL at 20:29

## 2021-07-26 RX ADMIN — SODIUM CHLORIDE, POTASSIUM CHLORIDE, SODIUM LACTATE AND CALCIUM CHLORIDE 1000 ML: 600; 310; 30; 20 INJECTION, SOLUTION INTRAVENOUS at 05:30

## 2021-07-26 RX ADMIN — VANCOMYCIN HYDROCHLORIDE 1500 MG: 5 INJECTION, POWDER, LYOPHILIZED, FOR SOLUTION INTRAVENOUS at 12:19

## 2021-07-26 RX ADMIN — CEFEPIME HYDROCHLORIDE 2 G: 2 INJECTION, POWDER, FOR SOLUTION INTRAVENOUS at 15:04

## 2021-07-26 RX ADMIN — ACETAMINOPHEN 650 MG: 325 TABLET ORAL at 20:30

## 2021-07-26 RX ADMIN — SODIUM CHLORIDE, PRESERVATIVE FREE 10 ML: 5 INJECTION INTRAVENOUS at 04:50

## 2021-07-26 RX ADMIN — Medication 10 MG/HR: at 23:22

## 2021-07-26 RX ADMIN — SODIUM CHLORIDE 1000 ML: 9 INJECTION, SOLUTION INTRAVENOUS at 03:32

## 2021-07-26 RX ADMIN — MAGNESIUM SULFATE 1 G: 1 INJECTION INTRAVENOUS at 10:13

## 2021-07-27 ENCOUNTER — APPOINTMENT (OUTPATIENT)
Dept: CT IMAGING | Facility: HOSPITAL | Age: 61
End: 2021-07-27

## 2021-07-27 LAB
ANION GAP SERPL CALCULATED.3IONS-SCNC: 10.6 MMOL/L (ref 5–15)
ARTERIAL PATENCY WRIST A: ABNORMAL
BACTERIA BLD CULT: ABNORMAL
BASE EXCESS BLDA CALC-SCNC: -2.1 MMOL/L (ref -2–2)
BDY SITE: ABNORMAL
BUN SERPL-MCNC: 16 MG/DL (ref 8–23)
BUN/CREAT SERPL: 13.4 (ref 7–25)
CALCIUM SPEC-SCNC: 9.3 MG/DL (ref 8.6–10.5)
CHLORIDE SERPL-SCNC: 98 MMOL/L (ref 98–107)
CO2 SERPL-SCNC: 24.4 MMOL/L (ref 22–29)
COHGB MFR BLD: 0.5 % (ref 0–1.5)
CREAT SERPL-MCNC: 1.19 MG/DL (ref 0.76–1.27)
DEPRECATED RDW RBC AUTO: 57.1 FL (ref 37–54)
ERYTHROCYTE [DISTWIDTH] IN BLOOD BY AUTOMATED COUNT: 16.4 % (ref 12.3–15.4)
FHHB: 2.3 % (ref 0–5)
GAS FLOW AIRWAY: 10 LPM
GFR SERPL CREATININE-BSD FRML MDRD: 62 ML/MIN/1.73
GLUCOSE BLDC GLUCOMTR-MCNC: 202 MG/DL (ref 70–99)
GLUCOSE BLDC GLUCOMTR-MCNC: 239 MG/DL (ref 70–99)
GLUCOSE BLDC GLUCOMTR-MCNC: 252 MG/DL (ref 70–99)
GLUCOSE SERPL-MCNC: 211 MG/DL (ref 65–99)
HCO3 BLDA-SCNC: 22.9 MMOL/L (ref 22–26)
HCT VFR BLD AUTO: 39.2 % (ref 37.5–51)
HGB BLD-MCNC: 12.3 G/DL (ref 13–17.7)
HGB BLDA-MCNC: 12.9 G/DL (ref 13.8–16.4)
L PNEUMO1 AG UR QL IA: NEGATIVE
MAGNESIUM SERPL-MCNC: 1.8 MG/DL (ref 1.6–2.4)
MCH RBC QN AUTO: 29.6 PG (ref 26.6–33)
MCHC RBC AUTO-ENTMCNC: 31.4 G/DL (ref 31.5–35.7)
MCV RBC AUTO: 94.5 FL (ref 79–97)
METHGB BLD QL: 0.2 % (ref 0–1.5)
MODALITY: ABNORMAL
OXYHGB MFR BLDV: 97 % (ref 94–99)
PCO2 BLDA: 40.2 MM HG (ref 35–45)
PH BLDA: 7.37 PH UNITS (ref 7.35–7.45)
PLATELET # BLD AUTO: 223 10*3/MM3 (ref 140–450)
PMV BLD AUTO: 10.4 FL (ref 6–12)
PO2 BLDA: 102.4 MM HG (ref 80–100)
POTASSIUM SERPL-SCNC: 4.1 MMOL/L (ref 3.5–5.2)
PROCALCITONIN SERPL-MCNC: 11.91 NG/ML (ref 0–0.25)
RBC # BLD AUTO: 4.15 10*6/MM3 (ref 4.14–5.8)
S PNEUM AG SPEC QL LA: NEGATIVE
SAO2 % BLDCOA: 97.7 % (ref 95–99)
SODIUM SERPL-SCNC: 133 MMOL/L (ref 136–145)
VANCOMYCIN TROUGH SERPL-MCNC: 15.82 MCG/ML (ref 5–20)
WBC # BLD AUTO: 13.55 10*3/MM3 (ref 3.4–10.8)

## 2021-07-27 PROCEDURE — 87899 AGENT NOS ASSAY W/OPTIC: CPT | Performed by: PHYSICIAN ASSISTANT

## 2021-07-27 PROCEDURE — 25010000002 CEFEPIME PER 500 MG: Performed by: HOSPITALIST

## 2021-07-27 PROCEDURE — 80048 BASIC METABOLIC PNL TOTAL CA: CPT | Performed by: FAMILY MEDICINE

## 2021-07-27 PROCEDURE — 71250 CT THORAX DX C-: CPT

## 2021-07-27 PROCEDURE — 82805 BLOOD GASES W/O2 SATURATION: CPT | Performed by: INTERNAL MEDICINE

## 2021-07-27 PROCEDURE — 99233 SBSQ HOSP IP/OBS HIGH 50: CPT | Performed by: INTERNAL MEDICINE

## 2021-07-27 PROCEDURE — 80202 ASSAY OF VANCOMYCIN: CPT | Performed by: PHYSICIAN ASSISTANT

## 2021-07-27 PROCEDURE — 99223 1ST HOSP IP/OBS HIGH 75: CPT | Performed by: INTERNAL MEDICINE

## 2021-07-27 PROCEDURE — 85027 COMPLETE CBC AUTOMATED: CPT | Performed by: FAMILY MEDICINE

## 2021-07-27 PROCEDURE — 83050 HGB METHEMOGLOBIN QUAN: CPT | Performed by: INTERNAL MEDICINE

## 2021-07-27 PROCEDURE — 82375 ASSAY CARBOXYHB QUANT: CPT | Performed by: INTERNAL MEDICINE

## 2021-07-27 PROCEDURE — 36600 WITHDRAWAL OF ARTERIAL BLOOD: CPT | Performed by: INTERNAL MEDICINE

## 2021-07-27 PROCEDURE — 25010000002 VANCOMYCIN 5 G RECONSTITUTED SOLUTION: Performed by: HOSPITALIST

## 2021-07-27 PROCEDURE — 63710000001 INSULIN LISPRO (HUMAN) PER 5 UNITS: Performed by: PHYSICIAN ASSISTANT

## 2021-07-27 PROCEDURE — 82962 GLUCOSE BLOOD TEST: CPT

## 2021-07-27 PROCEDURE — 94799 UNLISTED PULMONARY SVC/PX: CPT

## 2021-07-27 PROCEDURE — 83735 ASSAY OF MAGNESIUM: CPT | Performed by: FAMILY MEDICINE

## 2021-07-27 PROCEDURE — 84145 PROCALCITONIN (PCT): CPT | Performed by: FAMILY MEDICINE

## 2021-07-27 PROCEDURE — 87040 BLOOD CULTURE FOR BACTERIA: CPT | Performed by: FAMILY MEDICINE

## 2021-07-27 RX ORDER — DILTIAZEM HYDROCHLORIDE 60 MG/1
60 TABLET, FILM COATED ORAL 3 TIMES DAILY
Status: DISCONTINUED | OUTPATIENT
Start: 2021-07-27 | End: 2021-07-27

## 2021-07-27 RX ORDER — METOPROLOL TARTRATE 50 MG/1
50 TABLET, FILM COATED ORAL EVERY 12 HOURS SCHEDULED
Status: DISCONTINUED | OUTPATIENT
Start: 2021-07-27 | End: 2021-07-28

## 2021-07-27 RX ORDER — LEVALBUTEROL INHALATION SOLUTION 1.25 MG/3ML
1.25 SOLUTION RESPIRATORY (INHALATION) EVERY 4 HOURS PRN
Status: DISCONTINUED | OUTPATIENT
Start: 2021-07-27 | End: 2021-08-01 | Stop reason: HOSPADM

## 2021-07-27 RX ADMIN — CEFEPIME HYDROCHLORIDE 2 G: 2 INJECTION, POWDER, FOR SOLUTION INTRAVENOUS at 05:19

## 2021-07-27 RX ADMIN — VANCOMYCIN HYDROCHLORIDE 1500 MG: 5 INJECTION, POWDER, LYOPHILIZED, FOR SOLUTION INTRAVENOUS at 11:27

## 2021-07-27 RX ADMIN — METOPROLOL TARTRATE 50 MG: 50 TABLET, FILM COATED ORAL at 21:10

## 2021-07-27 RX ADMIN — METOPROLOL TARTRATE 5 MG: 1 INJECTION, SOLUTION INTRAVENOUS at 00:37

## 2021-07-27 RX ADMIN — SODIUM CHLORIDE, PRESERVATIVE FREE 10 ML: 5 INJECTION INTRAVENOUS at 09:34

## 2021-07-27 RX ADMIN — GABAPENTIN 800 MG: 400 CAPSULE ORAL at 21:11

## 2021-07-27 RX ADMIN — SODIUM CHLORIDE, PRESERVATIVE FREE 10 ML: 5 INJECTION INTRAVENOUS at 21:11

## 2021-07-27 RX ADMIN — ACETAMINOPHEN 650 MG: 325 TABLET ORAL at 08:09

## 2021-07-27 RX ADMIN — INSULIN LISPRO 6 UNITS: 100 INJECTION, SOLUTION INTRAVENOUS; SUBCUTANEOUS at 17:10

## 2021-07-27 RX ADMIN — VANCOMYCIN HYDROCHLORIDE 1500 MG: 5 INJECTION, POWDER, LYOPHILIZED, FOR SOLUTION INTRAVENOUS at 23:12

## 2021-07-27 RX ADMIN — CEFEPIME HYDROCHLORIDE 2 G: 2 INJECTION, POWDER, FOR SOLUTION INTRAVENOUS at 21:10

## 2021-07-27 RX ADMIN — DOCUSATE SODIUM 50MG AND SENNOSIDES 8.6MG 2 TABLET: 8.6; 5 TABLET, FILM COATED ORAL at 08:07

## 2021-07-27 RX ADMIN — DOCUSATE SODIUM 50MG AND SENNOSIDES 8.6MG 2 TABLET: 8.6; 5 TABLET, FILM COATED ORAL at 21:10

## 2021-07-27 RX ADMIN — GABAPENTIN 800 MG: 400 CAPSULE ORAL at 13:19

## 2021-07-27 RX ADMIN — METOPROLOL TARTRATE 5 MG: 1 INJECTION, SOLUTION INTRAVENOUS at 05:19

## 2021-07-27 RX ADMIN — INSULIN LISPRO 4 UNITS: 100 INJECTION, SOLUTION INTRAVENOUS; SUBCUTANEOUS at 12:15

## 2021-07-27 RX ADMIN — RIVAROXABAN 20 MG: 20 TABLET, FILM COATED ORAL at 08:07

## 2021-07-27 RX ADMIN — Medication 15 MG/HR: at 08:07

## 2021-07-27 RX ADMIN — ACETAMINOPHEN 650 MG: 325 TABLET ORAL at 23:12

## 2021-07-27 RX ADMIN — SODIUM CHLORIDE, POTASSIUM CHLORIDE, SODIUM LACTATE AND CALCIUM CHLORIDE 500 ML: 600; 310; 30; 20 INJECTION, SOLUTION INTRAVENOUS at 02:30

## 2021-07-27 RX ADMIN — DILTIAZEM HYDROCHLORIDE 30 MG: 60 TABLET, FILM COATED ORAL at 16:17

## 2021-07-27 RX ADMIN — GABAPENTIN 800 MG: 400 CAPSULE ORAL at 05:19

## 2021-07-27 RX ADMIN — INSULIN LISPRO 4 UNITS: 100 INJECTION, SOLUTION INTRAVENOUS; SUBCUTANEOUS at 08:08

## 2021-07-27 RX ADMIN — CEFEPIME HYDROCHLORIDE 2 G: 2 INJECTION, POWDER, FOR SOLUTION INTRAVENOUS at 13:18

## 2021-07-27 RX ADMIN — DILTIAZEM HYDROCHLORIDE 30 MG: 60 TABLET, FILM COATED ORAL at 21:10

## 2021-07-27 RX ADMIN — DILTIAZEM HYDROCHLORIDE 60 MG: 60 TABLET, FILM COATED ORAL at 09:34

## 2021-07-27 RX ADMIN — METOPROLOL TARTRATE 50 MG: 50 TABLET, FILM COATED ORAL at 09:34

## 2021-07-28 ENCOUNTER — APPOINTMENT (OUTPATIENT)
Dept: CARDIOLOGY | Facility: HOSPITAL | Age: 61
End: 2021-07-28

## 2021-07-28 LAB
ANION GAP SERPL CALCULATED.3IONS-SCNC: 7.4 MMOL/L (ref 5–15)
BACTERIA SPEC AEROBE CULT: ABNORMAL
BACTERIA SPEC AEROBE CULT: ABNORMAL
BASOPHILS # BLD AUTO: 0.03 10*3/MM3 (ref 0–0.2)
BASOPHILS NFR BLD AUTO: 0.3 % (ref 0–1.5)
BUN SERPL-MCNC: 16 MG/DL (ref 8–23)
BUN/CREAT SERPL: 14.3 (ref 7–25)
CALCIUM SPEC-SCNC: 9.8 MG/DL (ref 8.6–10.5)
CHLORIDE SERPL-SCNC: 98 MMOL/L (ref 98–107)
CO2 SERPL-SCNC: 24.6 MMOL/L (ref 22–29)
CREAT SERPL-MCNC: 1.12 MG/DL (ref 0.76–1.27)
DEPRECATED RDW RBC AUTO: 55 FL (ref 37–54)
EOSINOPHIL # BLD AUTO: 0.08 10*3/MM3 (ref 0–0.4)
EOSINOPHIL NFR BLD AUTO: 0.8 % (ref 0.3–6.2)
ERYTHROCYTE [DISTWIDTH] IN BLOOD BY AUTOMATED COUNT: 16 % (ref 12.3–15.4)
GFR SERPL CREATININE-BSD FRML MDRD: 67 ML/MIN/1.73
GLUCOSE BLDC GLUCOMTR-MCNC: 226 MG/DL (ref 70–99)
GLUCOSE BLDC GLUCOMTR-MCNC: 240 MG/DL (ref 70–99)
GLUCOSE BLDC GLUCOMTR-MCNC: 272 MG/DL (ref 70–99)
GLUCOSE BLDC GLUCOMTR-MCNC: 296 MG/DL (ref 70–99)
GLUCOSE SERPL-MCNC: 289 MG/DL (ref 65–99)
GRAM STN SPEC: ABNORMAL
HCT VFR BLD AUTO: 38.3 % (ref 37.5–51)
HGB BLD-MCNC: 12 G/DL (ref 13–17.7)
IMM GRANULOCYTES # BLD AUTO: 0.05 10*3/MM3 (ref 0–0.05)
IMM GRANULOCYTES NFR BLD AUTO: 0.5 % (ref 0–0.5)
ISOLATED FROM: ABNORMAL
ISOLATED FROM: ABNORMAL
LYMPHOCYTES # BLD AUTO: 0.37 10*3/MM3 (ref 0.7–3.1)
LYMPHOCYTES NFR BLD AUTO: 3.9 % (ref 19.6–45.3)
MAGNESIUM SERPL-MCNC: 1.9 MG/DL (ref 1.6–2.4)
MCH RBC QN AUTO: 29.3 PG (ref 26.6–33)
MCHC RBC AUTO-ENTMCNC: 31.3 G/DL (ref 31.5–35.7)
MCV RBC AUTO: 93.4 FL (ref 79–97)
MONOCYTES # BLD AUTO: 0.65 10*3/MM3 (ref 0.1–0.9)
MONOCYTES NFR BLD AUTO: 6.9 % (ref 5–12)
NEUTROPHILS NFR BLD AUTO: 8.24 10*3/MM3 (ref 1.7–7)
NEUTROPHILS NFR BLD AUTO: 87.6 % (ref 42.7–76)
NRBC BLD AUTO-RTO: 0 /100 WBC (ref 0–0.2)
PLATELET # BLD AUTO: 211 10*3/MM3 (ref 140–450)
PMV BLD AUTO: 10.7 FL (ref 6–12)
POTASSIUM SERPL-SCNC: 4.7 MMOL/L (ref 3.5–5.2)
RBC # BLD AUTO: 4.1 10*6/MM3 (ref 4.14–5.8)
SODIUM SERPL-SCNC: 130 MMOL/L (ref 136–145)
VANCOMYCIN TROUGH SERPL-MCNC: 11.38 MCG/ML (ref 5–20)
WBC # BLD AUTO: 9.42 10*3/MM3 (ref 3.4–10.8)

## 2021-07-28 PROCEDURE — 97530 THERAPEUTIC ACTIVITIES: CPT

## 2021-07-28 PROCEDURE — 25010000002 VANCOMYCIN 5 G RECONSTITUTED SOLUTION: Performed by: HOSPITALIST

## 2021-07-28 PROCEDURE — 99232 SBSQ HOSP IP/OBS MODERATE 35: CPT | Performed by: INTERNAL MEDICINE

## 2021-07-28 PROCEDURE — 80202 ASSAY OF VANCOMYCIN: CPT | Performed by: PHYSICIAN ASSISTANT

## 2021-07-28 PROCEDURE — 85025 COMPLETE CBC W/AUTO DIFF WBC: CPT | Performed by: INTERNAL MEDICINE

## 2021-07-28 PROCEDURE — 25010000002 DIGOXIN PER 500 MCG: Performed by: EMERGENCY MEDICINE

## 2021-07-28 PROCEDURE — 82962 GLUCOSE BLOOD TEST: CPT

## 2021-07-28 PROCEDURE — 94799 UNLISTED PULMONARY SVC/PX: CPT

## 2021-07-28 PROCEDURE — 83735 ASSAY OF MAGNESIUM: CPT | Performed by: INTERNAL MEDICINE

## 2021-07-28 PROCEDURE — 25010000002 CEFTRIAXONE PER 250 MG: Performed by: INTERNAL MEDICINE

## 2021-07-28 PROCEDURE — 80048 BASIC METABOLIC PNL TOTAL CA: CPT | Performed by: INTERNAL MEDICINE

## 2021-07-28 PROCEDURE — 63710000001 INSULIN LISPRO (HUMAN) PER 5 UNITS: Performed by: PHYSICIAN ASSISTANT

## 2021-07-28 PROCEDURE — 99233 SBSQ HOSP IP/OBS HIGH 50: CPT | Performed by: INTERNAL MEDICINE

## 2021-07-28 PROCEDURE — 93306 TTE W/DOPPLER COMPLETE: CPT

## 2021-07-28 PROCEDURE — 97165 OT EVAL LOW COMPLEX 30 MIN: CPT

## 2021-07-28 PROCEDURE — 25010000002 CEFEPIME PER 500 MG: Performed by: HOSPITALIST

## 2021-07-28 PROCEDURE — 97161 PT EVAL LOW COMPLEX 20 MIN: CPT

## 2021-07-28 PROCEDURE — 63710000001 INSULIN DETEMIR PER 5 UNITS: Performed by: INTERNAL MEDICINE

## 2021-07-28 RX ORDER — METOPROLOL SUCCINATE 50 MG/1
50 TABLET, EXTENDED RELEASE ORAL
Status: DISCONTINUED | OUTPATIENT
Start: 2021-07-29 | End: 2021-08-01 | Stop reason: HOSPADM

## 2021-07-28 RX ORDER — DILTIAZEM HYDROCHLORIDE 60 MG/1
120 TABLET, FILM COATED ORAL EVERY 6 HOURS SCHEDULED
Status: DISCONTINUED | OUTPATIENT
Start: 2021-07-28 | End: 2021-08-01 | Stop reason: HOSPADM

## 2021-07-28 RX ADMIN — METOPROLOL TARTRATE 50 MG: 50 TABLET, FILM COATED ORAL at 07:31

## 2021-07-28 RX ADMIN — INSULIN LISPRO 6 UNITS: 100 INJECTION, SOLUTION INTRAVENOUS; SUBCUTANEOUS at 08:42

## 2021-07-28 RX ADMIN — CEFTRIAXONE SODIUM 2 G: 2 INJECTION, POWDER, FOR SOLUTION INTRAMUSCULAR; INTRAVENOUS at 13:25

## 2021-07-28 RX ADMIN — INSULIN LISPRO 4 UNITS: 100 INJECTION, SOLUTION INTRAVENOUS; SUBCUTANEOUS at 17:33

## 2021-07-28 RX ADMIN — GABAPENTIN 800 MG: 400 CAPSULE ORAL at 05:05

## 2021-07-28 RX ADMIN — RIVAROXABAN 20 MG: 20 TABLET, FILM COATED ORAL at 08:42

## 2021-07-28 RX ADMIN — DILTIAZEM HYDROCHLORIDE 120 MG: 60 TABLET, FILM COATED ORAL at 11:59

## 2021-07-28 RX ADMIN — DIGOXIN 250 MCG: 0.25 INJECTION INTRAMUSCULAR; INTRAVENOUS at 11:59

## 2021-07-28 RX ADMIN — SODIUM CHLORIDE, PRESERVATIVE FREE 10 ML: 5 INJECTION INTRAVENOUS at 08:42

## 2021-07-28 RX ADMIN — DILTIAZEM HYDROCHLORIDE 120 MG: 60 TABLET, FILM COATED ORAL at 17:33

## 2021-07-28 RX ADMIN — METOPROLOL TARTRATE 5 MG: 1 INJECTION, SOLUTION INTRAVENOUS at 05:07

## 2021-07-28 RX ADMIN — CEFEPIME HYDROCHLORIDE 2 G: 2 INJECTION, POWDER, FOR SOLUTION INTRAVENOUS at 05:05

## 2021-07-28 RX ADMIN — VANCOMYCIN HYDROCHLORIDE 1500 MG: 5 INJECTION, POWDER, LYOPHILIZED, FOR SOLUTION INTRAVENOUS at 11:59

## 2021-07-28 RX ADMIN — INSULIN DETEMIR 10 UNITS: 100 INJECTION, SOLUTION SUBCUTANEOUS at 21:53

## 2021-07-28 RX ADMIN — SODIUM CHLORIDE, PRESERVATIVE FREE 10 ML: 5 INJECTION INTRAVENOUS at 21:52

## 2021-07-28 RX ADMIN — GABAPENTIN 800 MG: 400 CAPSULE ORAL at 21:52

## 2021-07-28 RX ADMIN — INSULIN LISPRO 6 UNITS: 100 INJECTION, SOLUTION INTRAVENOUS; SUBCUTANEOUS at 12:00

## 2021-07-28 RX ADMIN — GABAPENTIN 800 MG: 400 CAPSULE ORAL at 13:25

## 2021-07-28 RX ADMIN — DILTIAZEM HYDROCHLORIDE 30 MG: 60 TABLET, FILM COATED ORAL at 07:31

## 2021-07-29 LAB
ANION GAP SERPL CALCULATED.3IONS-SCNC: 8.7 MMOL/L (ref 5–15)
BASOPHILS # BLD AUTO: 0.03 10*3/MM3 (ref 0–0.2)
BASOPHILS NFR BLD AUTO: 0.4 % (ref 0–1.5)
BUN SERPL-MCNC: 15 MG/DL (ref 8–23)
BUN/CREAT SERPL: 16.9 (ref 7–25)
CALCIUM SPEC-SCNC: 9.5 MG/DL (ref 8.6–10.5)
CHLORIDE SERPL-SCNC: 98 MMOL/L (ref 98–107)
CO2 SERPL-SCNC: 23.3 MMOL/L (ref 22–29)
CREAT SERPL-MCNC: 0.89 MG/DL (ref 0.76–1.27)
DEPRECATED RDW RBC AUTO: 54.8 FL (ref 37–54)
EOSINOPHIL # BLD AUTO: 0.1 10*3/MM3 (ref 0–0.4)
EOSINOPHIL NFR BLD AUTO: 1.3 % (ref 0.3–6.2)
ERYTHROCYTE [DISTWIDTH] IN BLOOD BY AUTOMATED COUNT: 16.1 % (ref 12.3–15.4)
GFR SERPL CREATININE-BSD FRML MDRD: 87 ML/MIN/1.73
GLUCOSE BLDC GLUCOMTR-MCNC: 240 MG/DL (ref 70–99)
GLUCOSE BLDC GLUCOMTR-MCNC: 270 MG/DL (ref 70–99)
GLUCOSE BLDC GLUCOMTR-MCNC: 288 MG/DL (ref 70–99)
GLUCOSE BLDC GLUCOMTR-MCNC: 341 MG/DL (ref 70–99)
GLUCOSE SERPL-MCNC: 274 MG/DL (ref 65–99)
HCT VFR BLD AUTO: 36.9 % (ref 37.5–51)
HGB BLD-MCNC: 11.6 G/DL (ref 13–17.7)
IMM GRANULOCYTES # BLD AUTO: 0.04 10*3/MM3 (ref 0–0.05)
IMM GRANULOCYTES NFR BLD AUTO: 0.5 % (ref 0–0.5)
LYMPHOCYTES # BLD AUTO: 0.47 10*3/MM3 (ref 0.7–3.1)
LYMPHOCYTES NFR BLD AUTO: 6.3 % (ref 19.6–45.3)
MAGNESIUM SERPL-MCNC: 1.8 MG/DL (ref 1.6–2.4)
MCH RBC QN AUTO: 29.3 PG (ref 26.6–33)
MCHC RBC AUTO-ENTMCNC: 31.4 G/DL (ref 31.5–35.7)
MCV RBC AUTO: 93.2 FL (ref 79–97)
MONOCYTES # BLD AUTO: 0.64 10*3/MM3 (ref 0.1–0.9)
MONOCYTES NFR BLD AUTO: 8.5 % (ref 5–12)
NEUTROPHILS NFR BLD AUTO: 6.23 10*3/MM3 (ref 1.7–7)
NEUTROPHILS NFR BLD AUTO: 83 % (ref 42.7–76)
NRBC BLD AUTO-RTO: 0 /100 WBC (ref 0–0.2)
PLATELET # BLD AUTO: 195 10*3/MM3 (ref 140–450)
PMV BLD AUTO: 10.5 FL (ref 6–12)
POTASSIUM SERPL-SCNC: 4.6 MMOL/L (ref 3.5–5.2)
RBC # BLD AUTO: 3.96 10*6/MM3 (ref 4.14–5.8)
SODIUM SERPL-SCNC: 130 MMOL/L (ref 136–145)
WBC # BLD AUTO: 7.51 10*3/MM3 (ref 3.4–10.8)

## 2021-07-29 PROCEDURE — 85025 COMPLETE CBC W/AUTO DIFF WBC: CPT | Performed by: INTERNAL MEDICINE

## 2021-07-29 PROCEDURE — 80048 BASIC METABOLIC PNL TOTAL CA: CPT | Performed by: INTERNAL MEDICINE

## 2021-07-29 PROCEDURE — 99233 SBSQ HOSP IP/OBS HIGH 50: CPT | Performed by: INTERNAL MEDICINE

## 2021-07-29 PROCEDURE — 83735 ASSAY OF MAGNESIUM: CPT | Performed by: INTERNAL MEDICINE

## 2021-07-29 PROCEDURE — 25010000002 CEFTRIAXONE PER 250 MG: Performed by: INTERNAL MEDICINE

## 2021-07-29 PROCEDURE — 63710000001 INSULIN DETEMIR PER 5 UNITS: Performed by: INTERNAL MEDICINE

## 2021-07-29 PROCEDURE — 97110 THERAPEUTIC EXERCISES: CPT

## 2021-07-29 PROCEDURE — 82962 GLUCOSE BLOOD TEST: CPT

## 2021-07-29 PROCEDURE — 99232 SBSQ HOSP IP/OBS MODERATE 35: CPT | Performed by: INTERNAL MEDICINE

## 2021-07-29 PROCEDURE — 63710000001 INSULIN LISPRO (HUMAN) PER 5 UNITS: Performed by: PHYSICIAN ASSISTANT

## 2021-07-29 RX ORDER — FLUTICASONE PROPIONATE 50 MCG
2 SPRAY, SUSPENSION (ML) NASAL DAILY
Status: DISCONTINUED | OUTPATIENT
Start: 2021-07-29 | End: 2021-08-01 | Stop reason: HOSPADM

## 2021-07-29 RX ORDER — CETIRIZINE HYDROCHLORIDE 10 MG/1
10 TABLET ORAL DAILY
Status: DISCONTINUED | OUTPATIENT
Start: 2021-07-29 | End: 2021-08-01 | Stop reason: HOSPADM

## 2021-07-29 RX ADMIN — DILTIAZEM HYDROCHLORIDE 120 MG: 60 TABLET, FILM COATED ORAL at 06:11

## 2021-07-29 RX ADMIN — DOCUSATE SODIUM 50MG AND SENNOSIDES 8.6MG 2 TABLET: 8.6; 5 TABLET, FILM COATED ORAL at 22:11

## 2021-07-29 RX ADMIN — CETIRIZINE HYDROCHLORIDE 10 MG: 10 TABLET, FILM COATED ORAL at 08:21

## 2021-07-29 RX ADMIN — INSULIN LISPRO 7 UNITS: 100 INJECTION, SOLUTION INTRAVENOUS; SUBCUTANEOUS at 17:47

## 2021-07-29 RX ADMIN — CEFTRIAXONE SODIUM 2 G: 2 INJECTION, POWDER, FOR SOLUTION INTRAMUSCULAR; INTRAVENOUS at 15:49

## 2021-07-29 RX ADMIN — INSULIN LISPRO 4 UNITS: 100 INJECTION, SOLUTION INTRAVENOUS; SUBCUTANEOUS at 08:19

## 2021-07-29 RX ADMIN — METOPROLOL SUCCINATE 50 MG: 50 TABLET, EXTENDED RELEASE ORAL at 08:20

## 2021-07-29 RX ADMIN — INSULIN LISPRO 6 UNITS: 100 INJECTION, SOLUTION INTRAVENOUS; SUBCUTANEOUS at 12:26

## 2021-07-29 RX ADMIN — DILTIAZEM HYDROCHLORIDE 120 MG: 60 TABLET, FILM COATED ORAL at 17:47

## 2021-07-29 RX ADMIN — DILTIAZEM HYDROCHLORIDE 120 MG: 60 TABLET, FILM COATED ORAL at 00:52

## 2021-07-29 RX ADMIN — GABAPENTIN 800 MG: 400 CAPSULE ORAL at 06:11

## 2021-07-29 RX ADMIN — RIVAROXABAN 20 MG: 20 TABLET, FILM COATED ORAL at 08:21

## 2021-07-29 RX ADMIN — GABAPENTIN 800 MG: 400 CAPSULE ORAL at 15:12

## 2021-07-29 RX ADMIN — DOCUSATE SODIUM 50MG AND SENNOSIDES 8.6MG 2 TABLET: 8.6; 5 TABLET, FILM COATED ORAL at 08:20

## 2021-07-29 RX ADMIN — FLUTICASONE PROPIONATE 2 SPRAY: 50 SPRAY, METERED NASAL at 08:21

## 2021-07-29 RX ADMIN — GABAPENTIN 800 MG: 400 CAPSULE ORAL at 22:11

## 2021-07-29 RX ADMIN — SODIUM CHLORIDE, PRESERVATIVE FREE 10 ML: 5 INJECTION INTRAVENOUS at 08:21

## 2021-07-29 RX ADMIN — INSULIN DETEMIR 15 UNITS: 100 INJECTION, SOLUTION SUBCUTANEOUS at 22:09

## 2021-07-29 RX ADMIN — SODIUM CHLORIDE, PRESERVATIVE FREE 10 ML: 5 INJECTION INTRAVENOUS at 23:29

## 2021-07-29 RX ADMIN — DILTIAZEM HYDROCHLORIDE 120 MG: 60 TABLET, FILM COATED ORAL at 12:26

## 2021-07-30 ENCOUNTER — APPOINTMENT (OUTPATIENT)
Dept: GENERAL RADIOLOGY | Facility: HOSPITAL | Age: 61
End: 2021-07-30

## 2021-07-30 LAB
ANION GAP SERPL CALCULATED.3IONS-SCNC: 7.4 MMOL/L (ref 5–15)
BASOPHILS # BLD AUTO: 0.03 10*3/MM3 (ref 0–0.2)
BASOPHILS NFR BLD AUTO: 0.4 % (ref 0–1.5)
BH CV ECHO MEAS - AO ROOT DIAM: 3.2 CM
BH CV ECHO MEAS - EDV(MOD-SP2): 132 ML
BH CV ECHO MEAS - EDV(MOD-SP4): 134 ML
BH CV ECHO MEAS - EF(MOD-BP): 56.7 %
BH CV ECHO MEAS - ESV(MOD-SP2): 63 ML
BH CV ECHO MEAS - ESV(MOD-SP4): 58 ML
BH CV ECHO MEAS - IVSD: 1 CM
BH CV ECHO MEAS - LA DIMENSION(2D): 4.9 CM
BH CV ECHO MEAS - LAT PEAK E' VEL: 11.6 CM/SEC
BH CV ECHO MEAS - LVIDD: 5.4 CM
BH CV ECHO MEAS - LVIDS: 3.8 CM
BH CV ECHO MEAS - LVOT DIAM: 2 CM
BH CV ECHO MEAS - LVPWD: 1.4 CM
BH CV ECHO MEAS - MED PEAK E' VEL: 7.94 CM/SEC
BH CV ECHO MEAS - MV DEC TIME: 201 MSEC
BH CV ECHO MEAS - RVDD: 2.9 CM
BUN SERPL-MCNC: 14 MG/DL (ref 8–23)
BUN/CREAT SERPL: 18.2 (ref 7–25)
CALCIUM SPEC-SCNC: 9.2 MG/DL (ref 8.6–10.5)
CHLORIDE SERPL-SCNC: 99 MMOL/L (ref 98–107)
CO2 SERPL-SCNC: 27.6 MMOL/L (ref 22–29)
CREAT SERPL-MCNC: 0.77 MG/DL (ref 0.76–1.27)
DEPRECATED RDW RBC AUTO: 52.3 FL (ref 37–54)
EOSINOPHIL # BLD AUTO: 0.16 10*3/MM3 (ref 0–0.4)
EOSINOPHIL NFR BLD AUTO: 2 % (ref 0.3–6.2)
ERYTHROCYTE [DISTWIDTH] IN BLOOD BY AUTOMATED COUNT: 15.8 % (ref 12.3–15.4)
GFR SERPL CREATININE-BSD FRML MDRD: 103 ML/MIN/1.73
GLUCOSE BLDC GLUCOMTR-MCNC: 227 MG/DL (ref 70–99)
GLUCOSE BLDC GLUCOMTR-MCNC: 251 MG/DL (ref 70–99)
GLUCOSE BLDC GLUCOMTR-MCNC: 255 MG/DL (ref 70–99)
GLUCOSE BLDC GLUCOMTR-MCNC: 271 MG/DL (ref 70–99)
GLUCOSE SERPL-MCNC: 273 MG/DL (ref 65–99)
HCT VFR BLD AUTO: 36.1 % (ref 37.5–51)
HGB BLD-MCNC: 11.5 G/DL (ref 13–17.7)
IMM GRANULOCYTES # BLD AUTO: 0.08 10*3/MM3 (ref 0–0.05)
IMM GRANULOCYTES NFR BLD AUTO: 1 % (ref 0–0.5)
IVRT: 66 MSEC
LEFT ATRIUM VOLUME INDEX: 19.7 ML/M2
LYMPHOCYTES # BLD AUTO: 0.52 10*3/MM3 (ref 0.7–3.1)
LYMPHOCYTES NFR BLD AUTO: 6.5 % (ref 19.6–45.3)
MAGNESIUM SERPL-MCNC: 1.8 MG/DL (ref 1.6–2.4)
MAXIMAL PREDICTED HEART RATE: 159 BPM
MCH RBC QN AUTO: 29 PG (ref 26.6–33)
MCHC RBC AUTO-ENTMCNC: 31.9 G/DL (ref 31.5–35.7)
MCV RBC AUTO: 90.9 FL (ref 79–97)
MONOCYTES # BLD AUTO: 0.59 10*3/MM3 (ref 0.1–0.9)
MONOCYTES NFR BLD AUTO: 7.4 % (ref 5–12)
NEUTROPHILS NFR BLD AUTO: 6.63 10*3/MM3 (ref 1.7–7)
NEUTROPHILS NFR BLD AUTO: 82.7 % (ref 42.7–76)
NRBC BLD AUTO-RTO: 0 /100 WBC (ref 0–0.2)
PLATELET # BLD AUTO: 212 10*3/MM3 (ref 140–450)
PMV BLD AUTO: 10.6 FL (ref 6–12)
POTASSIUM SERPL-SCNC: 4.2 MMOL/L (ref 3.5–5.2)
RBC # BLD AUTO: 3.97 10*6/MM3 (ref 4.14–5.8)
SODIUM SERPL-SCNC: 134 MMOL/L (ref 136–145)
STRESS TARGET HR: 135 BPM
WBC # BLD AUTO: 8.01 10*3/MM3 (ref 3.4–10.8)

## 2021-07-30 PROCEDURE — 83735 ASSAY OF MAGNESIUM: CPT | Performed by: INTERNAL MEDICINE

## 2021-07-30 PROCEDURE — 71045 X-RAY EXAM CHEST 1 VIEW: CPT

## 2021-07-30 PROCEDURE — 97110 THERAPEUTIC EXERCISES: CPT

## 2021-07-30 PROCEDURE — 25010000002 CEFTRIAXONE PER 250 MG: Performed by: INTERNAL MEDICINE

## 2021-07-30 PROCEDURE — 99232 SBSQ HOSP IP/OBS MODERATE 35: CPT | Performed by: INTERNAL MEDICINE

## 2021-07-30 PROCEDURE — 82962 GLUCOSE BLOOD TEST: CPT

## 2021-07-30 PROCEDURE — 63710000001 INSULIN DETEMIR PER 5 UNITS: Performed by: INTERNAL MEDICINE

## 2021-07-30 PROCEDURE — 94799 UNLISTED PULMONARY SVC/PX: CPT

## 2021-07-30 PROCEDURE — 99233 SBSQ HOSP IP/OBS HIGH 50: CPT | Performed by: INTERNAL MEDICINE

## 2021-07-30 PROCEDURE — 85025 COMPLETE CBC W/AUTO DIFF WBC: CPT | Performed by: INTERNAL MEDICINE

## 2021-07-30 PROCEDURE — 80048 BASIC METABOLIC PNL TOTAL CA: CPT | Performed by: INTERNAL MEDICINE

## 2021-07-30 PROCEDURE — 63710000001 INSULIN LISPRO (HUMAN) PER 5 UNITS: Performed by: PHYSICIAN ASSISTANT

## 2021-07-30 RX ADMIN — METOPROLOL SUCCINATE 50 MG: 50 TABLET, EXTENDED RELEASE ORAL at 08:09

## 2021-07-30 RX ADMIN — GABAPENTIN 800 MG: 400 CAPSULE ORAL at 20:59

## 2021-07-30 RX ADMIN — INSULIN LISPRO 6 UNITS: 100 INJECTION, SOLUTION INTRAVENOUS; SUBCUTANEOUS at 12:12

## 2021-07-30 RX ADMIN — DILTIAZEM HYDROCHLORIDE 120 MG: 60 TABLET, FILM COATED ORAL at 12:12

## 2021-07-30 RX ADMIN — DILTIAZEM HYDROCHLORIDE 120 MG: 60 TABLET, FILM COATED ORAL at 01:26

## 2021-07-30 RX ADMIN — INSULIN DETEMIR 25 UNITS: 100 INJECTION, SOLUTION SUBCUTANEOUS at 20:59

## 2021-07-30 RX ADMIN — DILTIAZEM HYDROCHLORIDE 120 MG: 60 TABLET, FILM COATED ORAL at 05:52

## 2021-07-30 RX ADMIN — SODIUM CHLORIDE, PRESERVATIVE FREE 10 ML: 5 INJECTION INTRAVENOUS at 08:09

## 2021-07-30 RX ADMIN — DILTIAZEM HYDROCHLORIDE 120 MG: 60 TABLET, FILM COATED ORAL at 18:47

## 2021-07-30 RX ADMIN — INSULIN DETEMIR 15 UNITS: 100 INJECTION, SOLUTION SUBCUTANEOUS at 08:09

## 2021-07-30 RX ADMIN — DOCUSATE SODIUM 50MG AND SENNOSIDES 8.6MG 2 TABLET: 8.6; 5 TABLET, FILM COATED ORAL at 20:58

## 2021-07-30 RX ADMIN — RIVAROXABAN 20 MG: 20 TABLET, FILM COATED ORAL at 08:09

## 2021-07-30 RX ADMIN — DOCUSATE SODIUM 50MG AND SENNOSIDES 8.6MG 2 TABLET: 8.6; 5 TABLET, FILM COATED ORAL at 08:09

## 2021-07-30 RX ADMIN — INSULIN DETEMIR 10 UNITS: 100 INJECTION, SOLUTION SUBCUTANEOUS at 12:12

## 2021-07-30 RX ADMIN — INSULIN LISPRO 6 UNITS: 100 INJECTION, SOLUTION INTRAVENOUS; SUBCUTANEOUS at 17:39

## 2021-07-30 RX ADMIN — GABAPENTIN 800 MG: 400 CAPSULE ORAL at 05:52

## 2021-07-30 RX ADMIN — GABAPENTIN 800 MG: 400 CAPSULE ORAL at 13:14

## 2021-07-30 RX ADMIN — CETIRIZINE HYDROCHLORIDE 10 MG: 10 TABLET, FILM COATED ORAL at 08:09

## 2021-07-30 RX ADMIN — SODIUM CHLORIDE, PRESERVATIVE FREE 10 ML: 5 INJECTION INTRAVENOUS at 22:01

## 2021-07-30 RX ADMIN — FLUTICASONE PROPIONATE 2 SPRAY: 50 SPRAY, METERED NASAL at 08:10

## 2021-07-30 RX ADMIN — INSULIN LISPRO 6 UNITS: 100 INJECTION, SOLUTION INTRAVENOUS; SUBCUTANEOUS at 08:08

## 2021-07-30 RX ADMIN — CEFTRIAXONE SODIUM 2 G: 2 INJECTION, POWDER, FOR SOLUTION INTRAMUSCULAR; INTRAVENOUS at 13:15

## 2021-07-31 LAB
ANION GAP SERPL CALCULATED.3IONS-SCNC: 7.4 MMOL/L (ref 5–15)
BASOPHILS # BLD AUTO: 0.05 10*3/MM3 (ref 0–0.2)
BASOPHILS NFR BLD AUTO: 0.6 % (ref 0–1.5)
BUN SERPL-MCNC: 15 MG/DL (ref 8–23)
BUN/CREAT SERPL: 17.9 (ref 7–25)
CALCIUM SPEC-SCNC: 9.3 MG/DL (ref 8.6–10.5)
CHLORIDE SERPL-SCNC: 101 MMOL/L (ref 98–107)
CO2 SERPL-SCNC: 28.6 MMOL/L (ref 22–29)
CREAT SERPL-MCNC: 0.84 MG/DL (ref 0.76–1.27)
DEPRECATED RDW RBC AUTO: 53.3 FL (ref 37–54)
EOSINOPHIL # BLD AUTO: 0.29 10*3/MM3 (ref 0–0.4)
EOSINOPHIL NFR BLD AUTO: 3.8 % (ref 0.3–6.2)
ERYTHROCYTE [DISTWIDTH] IN BLOOD BY AUTOMATED COUNT: 15.8 % (ref 12.3–15.4)
GFR SERPL CREATININE-BSD FRML MDRD: 93 ML/MIN/1.73
GLUCOSE BLDC GLUCOMTR-MCNC: 206 MG/DL (ref 70–99)
GLUCOSE BLDC GLUCOMTR-MCNC: 243 MG/DL (ref 70–99)
GLUCOSE BLDC GLUCOMTR-MCNC: 250 MG/DL (ref 70–99)
GLUCOSE BLDC GLUCOMTR-MCNC: 295 MG/DL (ref 70–99)
GLUCOSE SERPL-MCNC: 229 MG/DL (ref 65–99)
HCT VFR BLD AUTO: 35.9 % (ref 37.5–51)
HGB BLD-MCNC: 11.3 G/DL (ref 13–17.7)
IMM GRANULOCYTES # BLD AUTO: 0.11 10*3/MM3 (ref 0–0.05)
IMM GRANULOCYTES NFR BLD AUTO: 1.4 % (ref 0–0.5)
LYMPHOCYTES # BLD AUTO: 0.66 10*3/MM3 (ref 0.7–3.1)
LYMPHOCYTES NFR BLD AUTO: 8.6 % (ref 19.6–45.3)
MAGNESIUM SERPL-MCNC: 1.8 MG/DL (ref 1.6–2.4)
MCH RBC QN AUTO: 29.3 PG (ref 26.6–33)
MCHC RBC AUTO-ENTMCNC: 31.5 G/DL (ref 31.5–35.7)
MCV RBC AUTO: 93 FL (ref 79–97)
MONOCYTES # BLD AUTO: 0.5 10*3/MM3 (ref 0.1–0.9)
MONOCYTES NFR BLD AUTO: 6.5 % (ref 5–12)
NEUTROPHILS NFR BLD AUTO: 6.1 10*3/MM3 (ref 1.7–7)
NEUTROPHILS NFR BLD AUTO: 79.1 % (ref 42.7–76)
NRBC BLD AUTO-RTO: 0 /100 WBC (ref 0–0.2)
PLATELET # BLD AUTO: 220 10*3/MM3 (ref 140–450)
PMV BLD AUTO: 10.6 FL (ref 6–12)
POTASSIUM SERPL-SCNC: 3.9 MMOL/L (ref 3.5–5.2)
RBC # BLD AUTO: 3.86 10*6/MM3 (ref 4.14–5.8)
SODIUM SERPL-SCNC: 137 MMOL/L (ref 136–145)
WBC # BLD AUTO: 7.71 10*3/MM3 (ref 3.4–10.8)

## 2021-07-31 PROCEDURE — 85025 COMPLETE CBC W/AUTO DIFF WBC: CPT | Performed by: INTERNAL MEDICINE

## 2021-07-31 PROCEDURE — 99232 SBSQ HOSP IP/OBS MODERATE 35: CPT | Performed by: INTERNAL MEDICINE

## 2021-07-31 PROCEDURE — 83735 ASSAY OF MAGNESIUM: CPT | Performed by: INTERNAL MEDICINE

## 2021-07-31 PROCEDURE — 99233 SBSQ HOSP IP/OBS HIGH 50: CPT | Performed by: INTERNAL MEDICINE

## 2021-07-31 PROCEDURE — 97110 THERAPEUTIC EXERCISES: CPT

## 2021-07-31 PROCEDURE — 80048 BASIC METABOLIC PNL TOTAL CA: CPT | Performed by: INTERNAL MEDICINE

## 2021-07-31 PROCEDURE — 82962 GLUCOSE BLOOD TEST: CPT

## 2021-07-31 PROCEDURE — 63710000001 INSULIN LISPRO (HUMAN) PER 5 UNITS: Performed by: INTERNAL MEDICINE

## 2021-07-31 PROCEDURE — 25010000002 CEFTRIAXONE PER 250 MG: Performed by: INTERNAL MEDICINE

## 2021-07-31 PROCEDURE — 63710000001 INSULIN LISPRO (HUMAN) PER 5 UNITS: Performed by: PHYSICIAN ASSISTANT

## 2021-07-31 PROCEDURE — 63710000001 INSULIN DETEMIR PER 5 UNITS: Performed by: INTERNAL MEDICINE

## 2021-07-31 RX ADMIN — DILTIAZEM HYDROCHLORIDE 120 MG: 60 TABLET, FILM COATED ORAL at 00:55

## 2021-07-31 RX ADMIN — GABAPENTIN 800 MG: 400 CAPSULE ORAL at 22:01

## 2021-07-31 RX ADMIN — DILTIAZEM HYDROCHLORIDE 120 MG: 60 TABLET, FILM COATED ORAL at 17:15

## 2021-07-31 RX ADMIN — GABAPENTIN 800 MG: 400 CAPSULE ORAL at 14:14

## 2021-07-31 RX ADMIN — METOPROLOL SUCCINATE 50 MG: 50 TABLET, EXTENDED RELEASE ORAL at 08:15

## 2021-07-31 RX ADMIN — SODIUM CHLORIDE, PRESERVATIVE FREE 10 ML: 5 INJECTION INTRAVENOUS at 08:15

## 2021-07-31 RX ADMIN — INSULIN DETEMIR 35 UNITS: 100 INJECTION, SOLUTION SUBCUTANEOUS at 22:01

## 2021-07-31 RX ADMIN — DILTIAZEM HYDROCHLORIDE 120 MG: 60 TABLET, FILM COATED ORAL at 05:49

## 2021-07-31 RX ADMIN — CETIRIZINE HYDROCHLORIDE 10 MG: 10 TABLET, FILM COATED ORAL at 08:15

## 2021-07-31 RX ADMIN — INSULIN DETEMIR 10 UNITS: 100 INJECTION, SOLUTION SUBCUTANEOUS at 12:28

## 2021-07-31 RX ADMIN — INSULIN LISPRO 5 UNITS: 100 INJECTION, SOLUTION INTRAVENOUS; SUBCUTANEOUS at 17:15

## 2021-07-31 RX ADMIN — INSULIN DETEMIR 25 UNITS: 100 INJECTION, SOLUTION SUBCUTANEOUS at 08:15

## 2021-07-31 RX ADMIN — INSULIN LISPRO 6 UNITS: 100 INJECTION, SOLUTION INTRAVENOUS; SUBCUTANEOUS at 08:15

## 2021-07-31 RX ADMIN — DOCUSATE SODIUM 50MG AND SENNOSIDES 8.6MG 2 TABLET: 8.6; 5 TABLET, FILM COATED ORAL at 08:14

## 2021-07-31 RX ADMIN — GABAPENTIN 800 MG: 400 CAPSULE ORAL at 05:49

## 2021-07-31 RX ADMIN — DILTIAZEM HYDROCHLORIDE 120 MG: 60 TABLET, FILM COATED ORAL at 12:27

## 2021-07-31 RX ADMIN — INSULIN LISPRO 5 UNITS: 100 INJECTION, SOLUTION INTRAVENOUS; SUBCUTANEOUS at 12:28

## 2021-07-31 RX ADMIN — CEFTRIAXONE SODIUM 2 G: 2 INJECTION, POWDER, FOR SOLUTION INTRAMUSCULAR; INTRAVENOUS at 14:14

## 2021-07-31 RX ADMIN — RIVAROXABAN 20 MG: 20 TABLET, FILM COATED ORAL at 08:15

## 2021-07-31 RX ADMIN — SODIUM CHLORIDE, PRESERVATIVE FREE 10 ML: 5 INJECTION INTRAVENOUS at 22:06

## 2021-07-31 RX ADMIN — INSULIN LISPRO 8 UNITS: 100 INJECTION, SOLUTION INTRAVENOUS; SUBCUTANEOUS at 12:28

## 2021-08-01 VITALS
BODY MASS INDEX: 37.19 KG/M2 | SYSTOLIC BLOOD PRESSURE: 152 MMHG | RESPIRATION RATE: 18 BRPM | HEART RATE: 75 BPM | HEIGHT: 77 IN | TEMPERATURE: 97.7 F | DIASTOLIC BLOOD PRESSURE: 71 MMHG | WEIGHT: 315 LBS | OXYGEN SATURATION: 90 %

## 2021-08-01 LAB
ANION GAP SERPL CALCULATED.3IONS-SCNC: 8.1 MMOL/L (ref 5–15)
BACTERIA SPEC AEROBE CULT: NORMAL
BACTERIA SPEC AEROBE CULT: NORMAL
BASOPHILS # BLD AUTO: 0.05 10*3/MM3 (ref 0–0.2)
BASOPHILS NFR BLD AUTO: 0.7 % (ref 0–1.5)
BUN SERPL-MCNC: 15 MG/DL (ref 8–23)
BUN/CREAT SERPL: 18.1 (ref 7–25)
CALCIUM SPEC-SCNC: 9.2 MG/DL (ref 8.6–10.5)
CHLORIDE SERPL-SCNC: 101 MMOL/L (ref 98–107)
CO2 SERPL-SCNC: 28.9 MMOL/L (ref 22–29)
CREAT SERPL-MCNC: 0.83 MG/DL (ref 0.76–1.27)
DEPRECATED RDW RBC AUTO: 53 FL (ref 37–54)
EOSINOPHIL # BLD AUTO: 0.3 10*3/MM3 (ref 0–0.4)
EOSINOPHIL NFR BLD AUTO: 4 % (ref 0.3–6.2)
ERYTHROCYTE [DISTWIDTH] IN BLOOD BY AUTOMATED COUNT: 15.6 % (ref 12.3–15.4)
GFR SERPL CREATININE-BSD FRML MDRD: 94 ML/MIN/1.73
GLUCOSE BLDC GLUCOMTR-MCNC: 223 MG/DL (ref 70–99)
GLUCOSE SERPL-MCNC: 235 MG/DL (ref 65–99)
HBA1C MFR BLD: 8.7 % (ref 4.8–5.6)
HCT VFR BLD AUTO: 36.7 % (ref 37.5–51)
HGB BLD-MCNC: 11.7 G/DL (ref 13–17.7)
IMM GRANULOCYTES # BLD AUTO: 0.13 10*3/MM3 (ref 0–0.05)
IMM GRANULOCYTES NFR BLD AUTO: 1.7 % (ref 0–0.5)
LYMPHOCYTES # BLD AUTO: 0.7 10*3/MM3 (ref 0.7–3.1)
LYMPHOCYTES NFR BLD AUTO: 9.2 % (ref 19.6–45.3)
MAGNESIUM SERPL-MCNC: 1.7 MG/DL (ref 1.6–2.4)
MCH RBC QN AUTO: 29.5 PG (ref 26.6–33)
MCHC RBC AUTO-ENTMCNC: 31.9 G/DL (ref 31.5–35.7)
MCV RBC AUTO: 92.4 FL (ref 79–97)
MONOCYTES # BLD AUTO: 0.53 10*3/MM3 (ref 0.1–0.9)
MONOCYTES NFR BLD AUTO: 7 % (ref 5–12)
NEUTROPHILS NFR BLD AUTO: 5.88 10*3/MM3 (ref 1.7–7)
NEUTROPHILS NFR BLD AUTO: 77.4 % (ref 42.7–76)
NRBC BLD AUTO-RTO: 0 /100 WBC (ref 0–0.2)
PLATELET # BLD AUTO: 256 10*3/MM3 (ref 140–450)
PMV BLD AUTO: 10.4 FL (ref 6–12)
POTASSIUM SERPL-SCNC: 3.7 MMOL/L (ref 3.5–5.2)
RBC # BLD AUTO: 3.97 10*6/MM3 (ref 4.14–5.8)
SODIUM SERPL-SCNC: 138 MMOL/L (ref 136–145)
WBC # BLD AUTO: 7.59 10*3/MM3 (ref 3.4–10.8)

## 2021-08-01 PROCEDURE — 83735 ASSAY OF MAGNESIUM: CPT | Performed by: INTERNAL MEDICINE

## 2021-08-01 PROCEDURE — 99232 SBSQ HOSP IP/OBS MODERATE 35: CPT | Performed by: NURSE PRACTITIONER

## 2021-08-01 PROCEDURE — 83036 HEMOGLOBIN GLYCOSYLATED A1C: CPT | Performed by: INTERNAL MEDICINE

## 2021-08-01 PROCEDURE — 85025 COMPLETE CBC W/AUTO DIFF WBC: CPT | Performed by: INTERNAL MEDICINE

## 2021-08-01 PROCEDURE — 80048 BASIC METABOLIC PNL TOTAL CA: CPT | Performed by: INTERNAL MEDICINE

## 2021-08-01 PROCEDURE — 63710000001 INSULIN DETEMIR PER 5 UNITS: Performed by: INTERNAL MEDICINE

## 2021-08-01 PROCEDURE — 99239 HOSP IP/OBS DSCHRG MGMT >30: CPT | Performed by: INTERNAL MEDICINE

## 2021-08-01 PROCEDURE — 63710000001 INSULIN LISPRO (HUMAN) PER 5 UNITS: Performed by: INTERNAL MEDICINE

## 2021-08-01 PROCEDURE — 82962 GLUCOSE BLOOD TEST: CPT

## 2021-08-01 RX ORDER — AMOXICILLIN AND CLAVULANATE POTASSIUM 875; 125 MG/1; MG/1
1 TABLET, FILM COATED ORAL 2 TIMES DAILY
Status: DISCONTINUED | OUTPATIENT
Start: 2021-08-01 | End: 2021-08-01 | Stop reason: HOSPADM

## 2021-08-01 RX ORDER — AMOXICILLIN AND CLAVULANATE POTASSIUM 875; 125 MG/1; MG/1
1 TABLET, FILM COATED ORAL 2 TIMES DAILY
Qty: 9 TABLET | Refills: 0 | Status: SHIPPED | OUTPATIENT
Start: 2021-08-01 | End: 2021-08-06

## 2021-08-01 RX ADMIN — DILTIAZEM HYDROCHLORIDE 120 MG: 60 TABLET, FILM COATED ORAL at 01:00

## 2021-08-01 RX ADMIN — AMOXICILLIN AND CLAVULANATE POTASSIUM 1 TABLET: 875; 125 TABLET, FILM COATED ORAL at 08:25

## 2021-08-01 RX ADMIN — FLUTICASONE PROPIONATE 2 SPRAY: 50 SPRAY, METERED NASAL at 08:31

## 2021-08-01 RX ADMIN — INSULIN LISPRO 8 UNITS: 100 INJECTION, SOLUTION INTRAVENOUS; SUBCUTANEOUS at 08:26

## 2021-08-01 RX ADMIN — DILTIAZEM HYDROCHLORIDE 120 MG: 60 TABLET, FILM COATED ORAL at 05:30

## 2021-08-01 RX ADMIN — DOCUSATE SODIUM 50MG AND SENNOSIDES 8.6MG 2 TABLET: 8.6; 5 TABLET, FILM COATED ORAL at 08:25

## 2021-08-01 RX ADMIN — INSULIN LISPRO 10 UNITS: 100 INJECTION, SOLUTION INTRAVENOUS; SUBCUTANEOUS at 08:25

## 2021-08-01 RX ADMIN — INSULIN DETEMIR 40 UNITS: 100 INJECTION, SOLUTION SUBCUTANEOUS at 08:26

## 2021-08-01 RX ADMIN — METOPROLOL SUCCINATE 50 MG: 50 TABLET, EXTENDED RELEASE ORAL at 08:25

## 2021-08-01 RX ADMIN — RIVAROXABAN 20 MG: 20 TABLET, FILM COATED ORAL at 08:25

## 2021-08-01 RX ADMIN — SODIUM CHLORIDE, PRESERVATIVE FREE 10 ML: 5 INJECTION INTRAVENOUS at 08:30

## 2021-08-01 RX ADMIN — GABAPENTIN 800 MG: 400 CAPSULE ORAL at 05:30

## 2021-08-01 RX ADMIN — CETIRIZINE HYDROCHLORIDE 10 MG: 10 TABLET, FILM COATED ORAL at 08:25

## 2021-08-01 NOTE — PROGRESS NOTES
[facility]     Progress Note    Patient Name: Niall Mak  : 1960  MRN: [id number]  Primary Care Physician:  Shai Azar PA  Date of admission: 2021    Subjective   Subjective     Follow up on multifocal pneumonia and bacteremia from strep canis    Over the past 24 hours, continues on antibiotics.  Has been able to be weaned off oxygen completely.  No acute events overnight.    This morning,  Sitting up in chair on room air  No distress noted  Coughing up white phlegm  Using IS and flutter valve  Dyspnea improved  Slept well last night  Feels great today  Wants to go home    Review of Systems  General:  + Fatigue, No Fever  HEENT:  No Dysphagia, No Visual Changes  Respiratory: + Cough, + Dyspnea (improved), No Pleuritic Pain  Cardiovascular:  No Chest Pain, No Palpitations, + JACQUES (improved), No Chest Pressure  Gastrointestinal:  No Abdominal Pain, No Nausea, No Vomiting, No Diarrhea  Genitourinary:  No Dysuria, No Frequency, No Hesitancy  Musculoskeletal:  No Joint Tenderness, No Joint Stiffness    Objective   Objective     Vitals:   Temp:  [97.5 °F (36.4 °C)-97.9 °F (36.6 °C)] 97.7 °F (36.5 °C)  Heart Rate:  [] 75  Resp:  [18] 18  BP: (121-152)/(47-83) 152/71    Physical Exam   Vital Signs Reviewed  General: morbidly obese male, awake and alert sitting up in chair, NAD on room air  HEENT:  PERRL, EOMI.  OP, nares clear, no sinus tenderness  Neck:  Supple, no JVD, no thyromegaly  Lymph: no axillary, cervical, supraclavicular lymphadenopathy noted bilaterally  Chest: Improving aeration, diminished breath sounds, tympanic to percussion, no increased work of breathing  CV: Atrial fib with HR 78, no MGR, pulses 2+, equal  Abd: Large pannus that hangs down below the knees, NT, ND, + BS, no HSM  EXT:  no clubbing, no cyanosis, 2+ pitting edema with bilateral ace wraps, no joint tenderness  Neuro:  A&Ox3, CN grossly intact, no focal deficits  Skin: Venous stasis  changes    Result Review    Result Review:  I have personally reviewed the results from the time of this admission to 8/1/2021 15:04 EDT and agree with these findings:  [x]  Laboratory  [x]  Microbiology  [x]  Radiology  []  EKG/Telemetry   []  Cardiology/Vascular   []  Pathology  []  Old records  []  Other:  Most notable findings include: WBC 7.59, Cr 0.83, K+ 3.7, Mg+ 1.7    Assessment/Plan   Assessment / Plan     Brief Patient Summary:  Niall Mak is a 61 y.o. male who found a multifocal pneumonia and bacteremia with strep canis.  Repeat blood cultures negative.    Active Hospital Problems:  Active Hospital Problems    Diagnosis    • Sepsis (CMS/HCC)      Multifocal pneumonia of unspecified organism  Acute hypoxic respiratory failure requiring HFNC secondary to above  Bacteremia with strep canis  Afib with RVR  Obesity  OHS/JOEL    Plan:   On room air.  Continue Augmentin for 5 days to complete therapy.  Continue IS and flutter valve.  Discussed the importance of continuing this at home.  Encourage activity.  Up to chair/ambulate as tolerated.    Ok from pulmonary standpoint to discharge home.  Follow up with us in 2 weeks.    He has history of JOEL/OHS but not on CPAP d/t incomplete sleep study and claustrophobic years ago.  Discussed the importance of re-evaluating and treating this.    DVT prophylaxis:  Medical DVT prophylaxis orders are present.    CODE STATUS:    Level Of Support Discussed With: Patient  Code Status: CPR  Medical Interventions (Level of Support Prior to Arrest): Full    Labs, microbiology, radiology, medications, and provider notes personally reviewed.  Discussed with primary service and bedside RN.    Electronically signed by MIRIAM Andrade, 08/01/21, 3:05 PM EDT.

## 2021-08-01 NOTE — CASE MANAGEMENT/SOCIAL WORK
Discharge Planning Assessment  [facility]     Patient Name: Niall Mak  MRN: [id number]  Today's Date: 8/1/2021    Admit Date: 7/25/2021    Discharge Needs Assessment    No documentation.       Discharge Plan     Row Name 08/01/21 0938       Plan    Final Discharge Disposition Code  06 - home with home health care    Final Note  MD rounded and reported that patient will discharge home today. SW notified [facility] .        Continued Care and Services - Admitted Since 7/25/2021     Home Medical Care     Service Provider Request Status Selected Services Address Phone Fax Patient Preferred    [facility]  Accepted N/A [address], [place] [address] [phone] --       Internal Comment last updated by Alesha Dumont, RN 7/27/2021 1123    Pt is current pt of yours                           Demographic Summary    No documentation.       Functional Status    No documentation.       Psychosocial    No documentation.       Abuse/Neglect    No documentation.       Legal    No documentation.       Substance Abuse    No documentation.       Patient Forms    No documentation.           BINDU Ibarra

## 2021-08-01 NOTE — DISCHARGE SUMMARY
[facility]         HOSPITALIST  DISCHARGE SUMMARY    Patient Name: Niall Mak  : 1960  MRN: [id number]    Date of Admission: 2021  Date of Discharge:  21  Primary Care Physician: Shai Azar PA    Consultants:  -Pulmonary: Dr. Simba Levy  -Cardiology: Dr. Veronica PETERSON;[facility] Problems:  Multifocal community-acquired pneumonia  Sepsis secondary to above  Bacteremia due to Streptococcus canis  Atrial fibrillation with RVR  Bilateral lower extremity cellulitis  Acute hypoxic respiratory failure  Lactic acidosis  Hypomagnesemia  Type 2 diabetes mellitus  Chronic anticoagulation with Xarelto  COPD  Neuropathy  Morbid obesity    Hospital Course     Hospital Course:  Niall Mak is a 61 y.o. male with past medical history significant for atrial fibrillation on anticoagulation with Xarelto, COPD, neuropathy, type 2 diabetes mellitus, congestive heart failure and morbid obesity presented to ED with complaints of rigors. Evaluation in ED significant for patient being hypoxic requiring 4 L of supplemental O2 to maintain sats greater than 90%, O2 requirement increased during admission. Patient found to be in atrial fibrillation with RVR and started on diltiazem drip. Patient also found to be septic and empiric antibiotics were started. Cardiology and pulmonology consulted to assist in care.  Patient blood cultures returned positive for Streptococcus canis and antibiotic management changed to ceftriaxone based on sensitivities.  Patient's rate control medications altered and atrial fibrillation with improved control.  Patient's condition to continue to improve throughout admission.  Patient able to be weaned off of supplemental O2 and patient was ambulating with therapy.  Patient will complete antibiotic treatment with Augmentin as an outpatient.  Regarding patient's type 2 diabetes mellitus, patient did have hyperglycemia during admission and was  started on insulin therapy.  Patient not on insulin therapy at home, I discussed this at length with the patient and decision was made for patient to have close follow-up with his PCP where he can have further discussion on whether additional p.o. medications will be prescribed or if patient would start on insulin therapy.  Hemoglobin A1c was collected and pending at time of discharge.  Patient was hemodynamically stable no additional inpatient evaluation work-up necessary at this time, patient will discharge home with outpatient follow-up with PCP, pulmonology and cardiology.    DISCHARGE Follow Up Recommendations for labs and diagnostics:   -Follow-up with PCP in 3 to 5 days.  Discuss management of patient's type 2 diabetes.  Possibility of additional p.o. medications versus insulin therapy and follow-up on hemoglobin A1c result.  -Follow-up with pulmonology in 2 weeks  -Follow-up with cardiology in 2 weeks    Day of Discharge     Vital Signs:  Temp:  [97.5 °F (36.4 °C)-97.9 °F (36.6 °C)] 97.7 °F (36.5 °C)  Heart Rate:  [] 75  Resp:  [18] 18  BP: (121-152)/(47-83) 152/71  Physical Exam:   Gen: No acute distress, conversant, pleasant, sitting up in chair at bedside  HEENT: MMM, Atraumatic, not wearing nasal cannula  Neck: Supple, Trachea midline  Resp: Diminished breath sounds bilaterally, difficult to auscultate secondary to body habitus, equal chest rise bilaterally, normal respiratory effort  Card: IRIR, No m/r/g  Abd: Obese, soft, Nontender, Nondistended, + bowel sounds  Ext: No cyanosis, No clubbing  Neuro: CN II-XII grossly intact, No focal deficits appreciated  Psych: AAO x 3, Normal mood, Normal affect    Discharge Details        Discharge Medications      New Medications      Instructions Start Date   amoxicillin-clavulanate 875-125 MG per tablet  Commonly known as: AUGMENTIN   1 tablet, Oral, 2 Times Daily         Continue These Medications      Instructions Start Date   allopurinol 300 MG  tablet  Commonly known as: ZYLOPRIM   300 mg, Oral, Daily      ascorbic acid 500 MG capsule controlled-release CR capsule  Commonly known as: VITAMIN C   500 mg, Oral, Daily      Cinnamon 500 MG capsule   500 mg, Oral, 2 times daily      Cyanocobalamin 2500 MCG sublingual tablet   2,500 mcg, Sublingual, Daily      dilTIAZem  MG 24 hr capsule  Commonly known as: CARDIZEM CD   180 mg, Oral, Daily      gabapentin 800 MG tablet  Commonly known as: NEURONTIN   800 mg, Oral, 3 Times Daily      glimepiride 4 MG tablet  Commonly known as: AMARYL   4 mg, Oral, Every Morning Before Breakfast      metFORMIN 500 MG tablet  Commonly known as: GLUCOPHAGE   500 mg, Oral, 2 Times Daily With Meals      metoprolol succinate  MG 24 hr tablet  Commonly known as: TOPROL-XL   100 mg, Oral, Daily      rivaroxaban 20 MG tablet  Commonly known as: XARELTO   20 mg, Oral, Daily      spironolactone 25 MG tablet  Commonly known as: ALDACTONE   25 mg, Oral, Daily         Stop These Medications    digoxin 250 MCG tablet  Commonly known as: LANOXIN            Allergies   Allergen Reactions   • Cucumber Extract Unknown - High Severity   • Flu Virus Vaccine Unknown - High Severity   • Januvia [Sitagliptin] Unknown - High Severity   • Tomato Unknown - High Severity       Discharge Disposition:  Home-Health Care [facility] [place] [facility]    Diet:  Hospital:  Diet Order   Procedures   • Diet Regular; Consistent Carbohydrate       Discharge Activity:   Activity Instructions     Activity as Tolerated      Gradually Increase Activity Until at Pre-Hospitalization Level            CODE STATUS:  Code Status and Medical Interventions:   Ordered at: 07/26/21 0109     Level Of Support Discussed With:    Patient     Code Status:    CPR     Medical Interventions (Level of Support Prior to Arrest):    Full       No future appointments.    Additional Instructions for the Follow-ups that You Need to Schedule     Discharge Follow-up with PCP   As directed       Currently  Documented PCP:    Shai Azar PA    PCP Phone Number:    [phone]     Follow Up Details: Follow-up in 3-5 days         Discharge Follow-up with Specified Provider: Dr. Veronica Nunez; 2 Weeks   As directed      To: Dr. Veronica Nunez    Follow Up: 2 Weeks         Discharge Follow-up with Specified Provider: Dr. Simba Levy; 2 Weeks   As directed      To: Dr. Simba Levy    Follow Up: 2 Weeks               Pertinent  and/or Most Recent Results     RADIOLOGY:  XR Chest 1 View [[id number]] Nestor as Reviewed   Order Status: Completed Collected: 07/30/21 1049    Updated: 07/30/21 1053   Narrative:     PROCEDURE: XR CHEST 1 VW       COMPARISON: [facility], CT, CT CHEST WO CONTRAST DIAGNOSTIC, 7/27/2021, 14:25.     [facility], CR, XR CHEST 1 VW, 7/25/2021, 22:11.       INDICATIONS: follow up pneumonia       FINDINGS:   There is mild cardiomegaly.  There is hazy right-sided airspace disease compatible with pneumonia.     Left lung appears grossly clear.  No definite pleural effusion or pneumothorax identified.       CONCLUSION:   1. Persistent hazy right-sided airspace disease consistent with pneumonia.                        JENIFFER KWAN MD         Electronically Signed and Approved By: JENIFFER KWAN MD on 7/30/2021 at 10:49                      CT Chest Without Contrast Diagnostic [[id number]] Nestor as Reviewed   Order Status: Completed Collected: 07/27/21 1444    Updated: 07/27/21 1448   Narrative:     PROCEDURE: CT CHEST WO CONTRAST DIAGNOSTIC       COMPARISON: [facility], CR, XR CHEST 1 VW, 7/25/2021, 22:11.  [facility], CT, ABDOMEN/PELVIS WITH CONTRAST, 4/25/2019, 13:02.       INDICATIONS: Respiratory illness, nondiagnostic xray       TECHNIQUE: CT images were created without the administration of contrast material.         PROTOCOL:   Standard imaging protocol performed       RADIATION:   DLP: 643mGy*cm     Automated exposure control  was utilized to minimize radiation dose.       FINDINGS:   The left lung is predominately clear.  There is patchy opacity seen throughout the right lung.     There is mosaic attenuation in the right lung.  Small right pleural effusion.  Compressive   atelectasis at the right lung base.  No pathologically enlarged lymph nodes in the chest.       Hepatomegaly.  Both the liver and spleen are prominent.  No acute findings are seen in the included   upper abdomen.       CONCLUSION: Small right pleural effusion with compressive atelectasis at the right lung base.       Patchy airspace opacity throughout the right lung suspicious for pneumonia.  New       Mosaic perfusion most notable in the right lung is a nonspecific finding but could be seen in the   setting of small vessel/airway disease.                JES VARELA MD         Electronically Signed and Approved By: JES VARELA MD on 7/27/2021 at 14:44                      XR Chest 1 View [[id number]] Nestor as Reviewed   Order Status: Completed Collected: 07/25/21 2246    Updated: 07/25/21 2250   Narrative:     PROCEDURE: XR CHEST 1 VW       COMPARISON: [facility], , CHEST AP/PA 1 VIEW, 12/29/2017, 11:50.       INDICATIONS: Severe Sepsis triage protocol       FINDINGS:   The exam is significantly limited by the patient leaning to the left and body habitus with   difficulty positioning.  There is left basilar consolidation versus atelectasis and pleural   effusion.  The right lung heart size is unchanged.  No acute osseous abnormality.       CONCLUSION: Limited study demonstrates left basilar consolidation versus atelectasis.                        JACINDA PEDERSON MD         Electronically Signed and Approved By: JACINDA PEDERSON MD on 7/25/2021 at 22:47          LAB RESULTS:      Lab 08/01/21  0513 07/31/21  0526 07/30/21  0626 07/29/21  0631 07/28/21  0542 07/27/21  0622 07/26/21  0624 07/26/21  0624 07/25/21  2142 07/25/21  2142   WBC 7.59 7.71 8.01 7.51  9.42 13.55*   < > 24.37*   < > 13.82*   HEMOGLOBIN 11.7* 11.3* 11.5* 11.6* 12.0* 12.3*   < > 12.4*   < > 12.9*   HEMATOCRIT 36.7* 35.9* 36.1* 36.9* 38.3 39.2   < > 39.6   < > 40.5   PLATELETS 256 220 212 195 211 223   < > 260   < > 276   NEUTROS ABS 5.88 6.10 6.63 6.23 8.24*  --   --   --    < > 12.39*   IMMATURE GRANS (ABS) 0.13* 0.11* 0.08* 0.04 0.05  --   --   --    < > 0.06*   LYMPHS ABS 0.70 0.66* 0.52* 0.47* 0.37*  --   --   --    < > 0.68*   MONOS ABS 0.53 0.50 0.59 0.64 0.65  --   --   --    < > 0.55   EOS ABS 0.30 0.29 0.16 0.10 0.08  --   --   --    < > 0.11   MCV 92.4 93.0 90.9 93.2 93.4 94.5   < > 95.0   < > 92.3   CRP  --   --   --   --   --   --   --   --   --  2.99*   PROCALCITONIN  --   --   --   --   --  11.91*  --  14.95*  --   --    LACTATE  --   --   --   --   --   --   --  4.0*  --  3.7*   PROTIME  --   --   --   --   --   --   --   --   --  12.0   APTT  --   --   --   --   --   --   --   --   --  31.4    < > = values in this interval not displayed.         Lab 08/01/21 0513 07/31/21 0526 07/30/21  0626 07/29/21  0631 07/28/21  0542 07/26/21  1108 07/25/21  2142   SODIUM 138 137 134* 130* 130*   < > 139   POTASSIUM 3.7 3.9 4.2 4.6 4.7   < > 4.7   CHLORIDE 101 101 99 98 98   < > 100   CO2 28.9 28.6 27.6 23.3 24.6   < > 24.7   ANION GAP 8.1 7.4 7.4 8.7 7.4   < > 14.3   BUN 15 15 14 15 16   < > 15   CREATININE 0.83 0.84 0.77 0.89 1.12   < > 1.10   GLUCOSE 235* 229* 273* 274* 289*   < > 198*   CALCIUM 9.2 9.3 9.2 9.5 9.8   < > 9.7   MAGNESIUM 1.7 1.8 1.8 1.8 1.9   < > 1.5*   PHOSPHORUS  --   --   --   --   --   --  2.9    < > = values in this interval not displayed.         Lab 07/25/21  2142   TOTAL PROTEIN 6.9   ALBUMIN 3.90   GLOBULIN 3.0   ALT (SGPT) 14   AST (SGOT) 21   BILIRUBIN 0.5   ALK PHOS 55         Lab 07/26/21  0624 07/25/21  2142   PROBNP 1,508.0*  --    PROTIME  --  12.0   INR  --  1.10*                 Lab 07/27/21  1242 07/25/21  2207   PH, ARTERIAL 7.374 7.430   PCO2, ARTERIAL  40.2 38.2   PO2 .4* 70.0*   O2 SATURATION ART 97.7 93.8*   FIO2  --  36   HCO3 ART 22.9 24.8   BASE EXCESS ART -2.1* 0.7   CARBOXYHEMOGLOBIN 0.5 1.0     Brief Urine Lab Results  (Last result in the past 365 days)      Color   Clarity   Blood   Leuk Est   Nitrite   Protein   CREAT   Urine HCG        07/26/21 0852 Dark Yellow Cloudy Negative Negative Negative 30 mg/dL (1+)             Microbiology Results (last 10 days)     Procedure Component Value - Date/Time    S. Pneumo Ag Urine or CSF - Urine, Urine, Clean Catch [[id number]]  (Normal) Collected: 07/27/21 1949    Lab Status: Final result Specimen: Urine, Clean Catch Updated: 07/27/21 2113     Strep Pneumo Ag Negative    Legionella Antigen, Urine - Urine, Urine, Clean Catch [[id number]]  (Normal) Collected: 07/27/21 1949    Lab Status: Final result Specimen: Urine, Clean Catch Updated: 07/27/21 2114     LEGIONELLA ANTIGEN, URINE Negative    Blood Culture - Blood, Hand, Left [[id number]] Collected: 07/27/21 0621    Lab Status: Final result Specimen: Blood from Hand, Left Updated: 08/01/21 0645     Blood Culture No growth at 5 days    Blood Culture - Blood, Chest, Left [[id number]] Collected: 07/27/21 0621    Lab Status: Final result Specimen: Blood from Chest, Left Updated: 08/01/21 0645     Blood Culture No growth at 5 days    COVID PRE-OP / PRE-PROCEDURE SCREENING ORDER (NO ISOLATION) - Swab, Nasopharynx [[id number]]  (Normal) Collected: 07/25/21 2300    Lab Status: Final result Specimen: Swab from Nasopharynx Updated: 07/26/21 0819    Narrative:      The following orders were created for panel order COVID PRE-OP / PRE-PROCEDURE SCREENING ORDER (NO ISOLATION) - Swab, Nasopharynx.  Procedure                               Abnormality         Status                     ---------                               -----------         ------                     COVID-19,CEPHEID,COR/MICHAEL...[[id number]]  Normal              Final result                 Please view results for  these tests on the individual orders.    Influenza Antigen, Rapid - Swab, Nasopharynx [[id number]]  (Normal) Collected: 07/25/21 2300    Lab Status: Final result Specimen: Swab from Nasopharynx Updated: 07/26/21 0002     Influenza A Ag, EIA Negative     Influenza B Ag, EIA Negative    COVID-19,CEPHEID,COR/MICHAEL/PAD/HAO IN-HOUSE(OR EMERGENT/ADD-ON),NP SWAB IN TRANSPORT MEDIA 3-4 HR TAT, RT-PCR - Swab, Nasopharynx [[id number]]  (Normal) Collected: 07/25/21 2300    Lab Status: Final result Specimen: Swab from Nasopharynx Updated: 07/26/21 0819     COVID19 Not Detected    Narrative:      Fact sheet for providers: https://www.fda.gov/media/462615/download     Fact sheet for patients: https://www.fda.gov/media/864428/download  Fact sheet for providers: https://www.fda.gov/media/841440/download     Fact sheet for patients: https://www.fda.gov/media/056661/download    Blood Culture - Blood, Arm, Left [[id number]]  (Abnormal) Collected: 07/25/21 2142    Lab Status: Final result Specimen: Blood from Arm, Left Updated: 07/28/21 1159     Blood Culture Streptococcus canis     Isolated from Aerobic and Anaerobic Bottles     Gram Stain Anaerobic Bottle Gram positive cocci in chains      Aerobic Bottle Gram positive cocci in chains    Narrative:      Refer to previous blood culture collected on 7/25/21 for MICs    Blood Culture - Blood, Arm, Left [[id number]]  (Abnormal)  (Susceptibility) Collected: 07/25/21 2142    Lab Status: Final result Specimen: Blood from Arm, Left Updated: 07/28/21 1159     Blood Culture Streptococcus canis     Isolated from Aerobic Bottle     Gram Stain Aerobic Bottle Gram positive cocci in chains    Susceptibility      Streptococcus canis      WILY      Ceftriaxone Susceptible      Clindamycin Susceptible      Levofloxacin Susceptible      Penicillin G Susceptible      Vancomycin Susceptible               Linear View                   Blood Culture ID, PCR - Blood, Arm, Left [[id number]]  (Abnormal) Collected:  07/25/21 2142    Lab Status: Final result Specimen: Blood from Arm, Left Updated: 07/27/21 0802     BCID, PCR Streptococcus spp, not A, B, or pneumoniae. Identification by BCID PCR.          Results for orders placed during the hospital encounter of 07/25/21    Adult Transthoracic Echo Complete W/ Cont if Necessary Per Protocol    Interpretation Summary  · Left ventricular wall thickness is consistent with mild posterior asymmetric hypertrophy.  · The right atrial cavity is mildly dilated.  · Left ventricular ejection fraction appears to be 56 - 60%.      Labs Pending at Discharge:  Pending Labs     Order Current Status    Hemoglobin A1c In process          Time spent on Discharge including face to face service: Greater than 30 minutes    Electronically signed by Shai Barnes MD, 08/01/21, 10:11 AM EDT.

## 2021-08-01 NOTE — DISCHARGE INSTR - APPOINTMENTS
Call Shai Azar's office at [phone] to schedule a follow-up appointment for 3-5 days after discharge    Call Dr. Arteaga's office at [phone] to schedule a follow-up appointment for 2 weeks after discharge    Call Dr. Levy's office at [phone] to schedule a follow-up appointment for 2 weeks after discharge

## 2021-08-02 ENCOUNTER — READMISSION MANAGEMENT (OUTPATIENT)
Dept: CALL CENTER | Facility: HOSPITAL | Age: 61
End: 2021-08-02

## 2021-08-02 NOTE — OUTREACH NOTE
Prep Survey      Responses   [place] facility patient discharged from?  [place]   Is LACE score < 7 ?  No   Emergency Room discharge w/ pulse ox?  No   Eligibility  Readm Mgmt   Discharge diagnosis  Multifocal community-acquired pneumonia   Does the patient have one of the following disease processes/diagnoses(primary or secondary)?  COPD/Pneumonia   Does the patient have Home health ordered?  Yes   What is the Home health agency?   [facility]    Is there a DME ordered?  No   Prep survey completed?  Yes          Shantel Kimble RN

## 2021-08-06 ENCOUNTER — READMISSION MANAGEMENT (OUTPATIENT)
Dept: CALL CENTER | Facility: HOSPITAL | Age: 61
End: 2021-08-06

## 2021-08-06 NOTE — OUTREACH NOTE
COPD/PN Week 1 Survey      Responses   [facility] [place] patient discharged from?  [place]   Does the patient have one of the following disease processes/diagnoses(primary or secondary)?  COPD/Pneumonia   Was the primary reason for admission:  Pneumonia   Week 1 attempt successful?  Yes   Call start time  1125   Call end time  1127   Discharge diagnosis  Multifocal community-acquired pneumonia   Meds reviewed with patient/caregiver?  Yes   Is the patient having any side effects they believe may be caused by any medication additions or changes?  No   Does the patient have all medications ordered at discharge?  Yes   Is the patient taking all medications as directed (includes completed medication regime)?  Yes   Does the patient have a primary care provider?   Yes   Does the patient have an appointment with their PCP or specialist within 7 days of discharge?  No   What is preventing the patient from scheduling follow up appointments within 7 days of discharge?  Haven't had time   Has the patient kept scheduled appointments due by today?  N/A   Did the patient receive a copy of their discharge instructions?  Yes   What is the patient's perception of their health status since discharge?  Improving   Are the patient's immunizations up to date?   No   If the patient is a current smoker, are they able to teach back resources for cessation?  Not a smoker   Is the patient/caregiver able to teach back signs and symptoms of worsening condition:  Fever/chills, Shortness of breath, Chest pain   Is the patient/caregiver able to teach back importance of completing antibiotic course of treatment?  Yes   Week 1 call completed?  Yes   Wrap up additional comments  Brief call, patient states that he is doing fine.          Unique Van RN

## 2022-09-12 ENCOUNTER — APPOINTMENT (OUTPATIENT)
Dept: CT IMAGING | Facility: HOSPITAL | Age: 62
End: 2022-09-12

## 2022-09-12 ENCOUNTER — HOSPITAL ENCOUNTER (EMERGENCY)
Facility: HOSPITAL | Age: 62
Discharge: HOME | End: 2022-09-13
Attending: EMERGENCY MEDICINE | Admitting: EMERGENCY MEDICINE

## 2022-09-12 VITALS
DIASTOLIC BLOOD PRESSURE: 89 MMHG | OXYGEN SATURATION: 92 % | TEMPERATURE: 98 F | HEART RATE: 98 BPM | RESPIRATION RATE: 16 BRPM | BODY MASS INDEX: 37.19 KG/M2 | SYSTOLIC BLOOD PRESSURE: 152 MMHG | HEIGHT: 77 IN | WEIGHT: 315 LBS

## 2022-09-12 DIAGNOSIS — N30.00 ACUTE CYSTITIS WITHOUT HEMATURIA: ICD-10-CM

## 2022-09-12 DIAGNOSIS — S30.0XXA CONTUSION OF PELVIS, INITIAL ENCOUNTER: ICD-10-CM

## 2022-09-12 DIAGNOSIS — S39.012A BACK STRAIN, INITIAL ENCOUNTER: ICD-10-CM

## 2022-09-12 DIAGNOSIS — S30.0XXA CONTUSION OF LOWER BACK, INITIAL ENCOUNTER: Primary | ICD-10-CM

## 2022-09-12 LAB
BACTERIA UR QL AUTO: ABNORMAL /HPF
BILIRUB UR QL STRIP: NEGATIVE
CLARITY UR: CLEAR
COLOR UR: YELLOW
GLUCOSE UR STRIP-MCNC: NEGATIVE MG/DL
HGB UR QL STRIP.AUTO: NEGATIVE
HYALINE CASTS UR QL AUTO: ABNORMAL /LPF
KETONES UR QL STRIP: NEGATIVE
LEUKOCYTE ESTERASE UR QL STRIP.AUTO: ABNORMAL
NITRITE UR QL STRIP: POSITIVE
PH UR STRIP.AUTO: 6.5 [PH] (ref 5–8)
PROT UR QL STRIP: ABNORMAL
RBC # UR STRIP: ABNORMAL /HPF
REF LAB TEST METHOD: ABNORMAL
SP GR UR STRIP: 1.01 (ref 1–1.03)
SQUAMOUS #/AREA URNS HPF: ABNORMAL /HPF
UROBILINOGEN UR QL STRIP: ABNORMAL
WBC # UR STRIP: ABNORMAL /HPF

## 2022-09-12 PROCEDURE — 25010000002 HYDROMORPHONE 1 MG/ML SOLUTION: Performed by: EMERGENCY MEDICINE

## 2022-09-12 PROCEDURE — 25010000002 ORPHENADRINE CITRATE PER 60 MG: Performed by: EMERGENCY MEDICINE

## 2022-09-12 PROCEDURE — 72131 CT LUMBAR SPINE W/O DYE: CPT

## 2022-09-12 PROCEDURE — 81001 URINALYSIS AUTO W/SCOPE: CPT | Performed by: EMERGENCY MEDICINE

## 2022-09-12 PROCEDURE — 72192 CT PELVIS W/O DYE: CPT

## 2022-09-12 PROCEDURE — 96372 THER/PROPH/DIAG INJ SC/IM: CPT

## 2022-09-12 PROCEDURE — 25010000002 CEFTRIAXONE PER 250 MG: Performed by: NURSE PRACTITIONER

## 2022-09-12 PROCEDURE — 99283 EMERGENCY DEPT VISIT LOW MDM: CPT

## 2022-09-12 PROCEDURE — 96365 THER/PROPH/DIAG IV INF INIT: CPT

## 2022-09-12 RX ORDER — ORPHENADRINE CITRATE 30 MG/ML
60 INJECTION INTRAMUSCULAR; INTRAVENOUS ONCE
Status: COMPLETED | OUTPATIENT
Start: 2022-09-12 | End: 2022-09-12

## 2022-09-12 RX ORDER — CEFTRIAXONE SODIUM 1 G/50ML
1 INJECTION, SOLUTION INTRAVENOUS ONCE
Status: COMPLETED | OUTPATIENT
Start: 2022-09-12 | End: 2022-09-13

## 2022-09-12 RX ORDER — LIDOCAINE 50 MG/G
1 PATCH TOPICAL EVERY 24 HOURS
Qty: 15 PATCH | Refills: 0 | Status: SHIPPED | OUTPATIENT
Start: 2022-09-12 | End: 2022-12-27 | Stop reason: ALTCHOICE

## 2022-09-12 RX ORDER — LORAZEPAM 2 MG/ML
1 INJECTION INTRAMUSCULAR ONCE
Status: COMPLETED | OUTPATIENT
Start: 2022-09-12 | End: 2022-09-13

## 2022-09-12 RX ORDER — CEFDINIR 300 MG/1
300 CAPSULE ORAL 2 TIMES DAILY
Qty: 20 CAPSULE | Refills: 0 | Status: SHIPPED | OUTPATIENT
Start: 2022-09-12 | End: 2022-09-22

## 2022-09-12 RX ORDER — ORPHENADRINE CITRATE 100 MG/1
100 TABLET, EXTENDED RELEASE ORAL 2 TIMES DAILY PRN
Qty: 20 TABLET | Refills: 0 | Status: SHIPPED | OUTPATIENT
Start: 2022-09-12 | End: 2022-12-27 | Stop reason: ALTCHOICE

## 2022-09-12 RX ADMIN — HYDROMORPHONE HYDROCHLORIDE 1 MG: 1 INJECTION, SOLUTION INTRAMUSCULAR; INTRAVENOUS; SUBCUTANEOUS at 20:27

## 2022-09-12 RX ADMIN — ORPHENADRINE CITRATE 60 MG: 60 INJECTION INTRAMUSCULAR; INTRAVENOUS at 20:26

## 2022-09-12 RX ADMIN — CEFTRIAXONE SODIUM 1 G: 1 INJECTION, SOLUTION INTRAVENOUS at 23:51

## 2022-09-12 NOTE — ED PROVIDER NOTES
Provider in Triage Note    --- PROVIDER IN TRIAGE NOTE ---    Patient was evaluated in triage by Derian knutson PA-C.  In short, the pt presented with fall and back pain. Occurred at home around 11 PM. Rolled off the end of the bed (3 ft). Hit head on the night stand and then landed on right side on concrete floor. Pain to right hip and lumbar back. Sat for 10 minutes and was able to get to sitting position. Got family help lifting up to the bed. Has only been able to stand once since the incident. Turned and had severe pain in the lumbar back (L>R) and pain has gradually worsened. Denies LOC, vomiting, seizures, headaches, changes in vision. Has neuropathy in b/l lower extremities up to the proximal shin at baseline. No progression since incident. Denies saddle anesthesia bowel or bladder incontinence. Last pain med was Hydrocodone and Gabapentin since 4 AM. Has not taken other maintenance medications. Orders were written and the patient was placed in waiting room, currently awaiting disposition.       Time: 5:06 PM EDT  Arrived by: ambulance  Chief Complaint: Back pain  History provided by: Patient  History is limited by: N/A     History of Present Illness:  Patient is a 62 y.o. year old male who presents to the emergency department with back and tailbone pain after fall        History provided by:  Patient  Fall  Mechanism of injury: fall    Injury location:  Torso and pelvis  Torso injury location:  Back  Pelvic injury location:  Pelvis, L buttock and R buttock  Incident location:  Home  Time since incident:  24 hours  Arrived directly from scene: no    Fall:     Fall occurred:  From a bed    Height of fall:  2    Impact surface:  Carpet    Point of impact:  Back    Entrapped after fall: no    Protective equipment: none    Suspicion of alcohol use: no    Tetanus status:  Up to date  Prior to arrival data:     Bystander interventions:  None    Patient ambulatory at scene: yes      Blood loss:  None     Responsiveness at scene:  Alert    Orientation at scene:  Person, place, situation and time    Loss of consciousness: no      Amnesic to event: no      Airway condition since incident:  Stable    Breathing condition since incident:  Stable    Circulation condition since incident:  Stable    Mental status condition since incident:  Stable    Disability condition since incident:  Worsening (Initially able to get up last night and get back up into bed gradually throughout the day mobility has decreased over  Can you get out of the bed.  Had to call EMS)  Associated symptoms: back pain    Associated symptoms: no abdominal pain, no blindness, no chest pain, no difficulty breathing, no headaches, no hearing loss, no loss of consciousness, no nausea, no neck pain, no seizures and no vomiting    Risk factors: diabetes    Risk factors: no anticoagulation therapy    Back Pain  Associated symptoms: no abdominal pain, no chest pain, no dysuria, no fever, no headaches, no numbness ( Chronic neuropathy with numbness in bilateral lower extremities) and no weakness        Similar Symptoms Previously: No  Recently seen: No      Patient Care Team  Primary Care Provider: Shai Azar PA    Past Medical History:     Allergies   Allergen Reactions   • Cucumber Extract Unknown - High Severity   • Influenza Virus Vaccine Unknown - High Severity   • Januvia [Sitagliptin] Unknown - High Severity   • Tomato Unknown - High Severity     Past Medical History:   Diagnosis Date   • A-fib ([facility])    • Cellulitis    • CHF (congestive heart failure) ([facility])    • Diabetes mellitus ([facility])    • Gout    • Neuropathy      Past Surgical History:   Procedure Laterality Date   • COLONOSCOPY     • TONSILLECTOMY       History reviewed. No pertinent family history.    Home Medications:  Prior to Admission medications    Medication Sig Start Date End Date Taking? Authorizing Provider   allopurinol (ZYLOPRIM) 300 MG tablet Take 300 mg by mouth Daily.     Moiz Velasquez MD   ascorbic acid (VITAMIN C) 500 MG capsule controlled-release CR capsule Take 500 mg by mouth Daily.    Moiz Velasquez MD   Cinnamon 500 MG capsule Take 500 mg by mouth 2 (two) times a day.    Moiz Velasquez MD   Cyanocobalamin 2500 MCG sublingual tablet Place 2,500 mcg under the tongue Daily.    Moiz Velasquez MD   dilTIAZem CD (CARDIZEM CD) 180 MG 24 hr capsule Take 180 mg by mouth Daily.    Moiz Velasquez MD   gabapentin (NEURONTIN) 800 MG tablet Take 800 mg by mouth 3 (Three) Times a Day.    Moiz Velasquez MD   glimepiride (AMARYL) 4 MG tablet Take 4 mg by mouth Every Morning Before Breakfast.    Moiz Velasquez MD   metFORMIN (GLUCOPHAGE) 500 MG tablet Take 500 mg by mouth 2 (Two) Times a Day With Meals.    Moiz Velasquez MD   metoprolol succinate XL (TOPROL-XL) 100 MG 24 hr tablet Take 100 mg by mouth Daily.    Moiz Velasquez MD   rivaroxaban (XARELTO) 20 MG tablet Take 20 mg by mouth Daily.    Moiz Velasquez MD   spironolactone (ALDACTONE) 25 MG tablet Take 25 mg by mouth Daily.    Moiz Velasquez MD        Social History:   Social History     Tobacco Use   • Smoking status: Never Smoker   • Smokeless tobacco: Never Used   Substance Use Topics   • Alcohol use: Never     Recent travel: no     Review of Systems:  Review of Systems   Constitutional: Negative for chills and fever.   HENT: Negative.  Negative for hearing loss.    Eyes: Negative for blindness, photophobia and visual disturbance.   Respiratory: Negative for cough and shortness of breath.    Cardiovascular: Negative for chest pain.   Gastrointestinal: Negative for abdominal pain, diarrhea, nausea and vomiting.        Negative for bowel incontinence   Genitourinary: Negative for dysuria, flank pain and hematuria.        Negative for bladder incontinence   Musculoskeletal: Positive for arthralgias, back pain and gait problem. Negative for neck pain.   Skin:  "Negative for rash.   Neurological: Negative for dizziness, tremors, seizures, loss of consciousness, syncope, facial asymmetry, weakness, light-headedness, numbness ( Chronic neuropathy with numbness in bilateral lower extremities) and headaches.        Negative for saddle anesthesia   Hematological: Negative.    Psychiatric/Behavioral: Negative.    All other systems reviewed and are negative.       Physical Exam:  /89   Pulse 98   Temp 98.7 °F (37.1 °C) (Oral)   Resp 18   Ht 195.6 cm (77\")   Wt (!) 219 kg (482 lb 12.9 oz)   SpO2 92%   BMI 57.25 kg/m²     Physical Exam  Vitals and nursing note reviewed.   Constitutional:       General: He is not in acute distress.     Appearance: Normal appearance. He is obese. He is not toxic-appearing.   HENT:      Head: Normocephalic and atraumatic.      Right Ear: Tympanic membrane, ear canal and external ear normal.      Left Ear: Tympanic membrane, ear canal and external ear normal.      Nose: Nose normal.      Mouth/Throat:      Mouth: Mucous membranes are moist.   Eyes:      Extraocular Movements: Extraocular movements intact.      Conjunctiva/sclera: Conjunctivae normal.   Cardiovascular:      Rate and Rhythm: Normal rate and regular rhythm.      Pulses: Normal pulses.      Heart sounds: Normal heart sounds.   Pulmonary:      Effort: Pulmonary effort is normal.      Breath sounds: Normal breath sounds.   Abdominal:      General: Bowel sounds are normal. There is no distension.      Palpations: Abdomen is soft.      Tenderness: There is no abdominal tenderness. There is no right CVA tenderness or left CVA tenderness.   Musculoskeletal:         General: Tenderness ( Diffuse tenderness mid and lower lumbar vertebral and bilateral soft tissues down into pelvis) present.      Cervical back: Normal range of motion.      Left hip: Tenderness present. No deformity.      Comments: Mild midline and paraspinal lumbar tenderness    Limited range of motion patient unable to " sit or stand without assistance and severe pain   Skin:     General: Skin is warm and dry.      Capillary Refill: Capillary refill takes less than 2 seconds.      Coloration: Skin is not cyanotic.   Neurological:      General: No focal deficit present.      Mental Status: He is alert and oriented to person, place, and time.   Psychiatric:         Attention and Perception: Attention and perception normal.         Mood and Affect: Mood normal.         Behavior: Behavior normal.         Thought Content: Thought content normal.         Judgment: Judgment normal.             Medications in the Emergency Department:  Medications   cefTRIAXone (ROCEPHIN) IVPB 1 g (1 g Intravenous New Bag 9/12/22 2351)   HYDROmorphone (DILAUDID) injection 1 mg (has no administration in time range)   LORazepam (ATIVAN) injection 1 mg (has no administration in time range)   orphenadrine (NORFLEX) injection 60 mg (60 mg Intramuscular Given 9/12/22 2026)   HYDROmorphone (DILAUDID) injection 1 mg (1 mg Intramuscular Given 9/12/22 2027)        Labs  Lab Results (last 24 hours)     Procedure Component Value Units Date/Time    Urinalysis With Microscopic If Indicated (No Culture) - Urine, Clean Catch [[id number]]  (Abnormal) Collected: 09/12/22 2037    Specimen: Urine, Clean Catch Updated: 09/12/22 2052     Color, UA Yellow     Appearance, UA Clear     pH, UA 6.5     Specific Gravity, UA 1.013     Glucose, UA Negative     Ketones, UA Negative     Bilirubin, UA Negative     Blood, UA Negative     Protein, UA Trace     Leuk Esterase, UA Trace     Nitrite, UA Positive     Urobilinogen, UA 1.0 E.U./dL    Urinalysis, Microscopic Only - Urine, Clean Catch [[id number]]  (Abnormal) Collected: 09/12/22 2037    Specimen: Urine, Clean Catch Updated: 09/12/22 2127     RBC, UA None Seen /HPF      WBC, UA 13-20 /HPF      Bacteria, UA 4+ /HPF      Squamous Epithelial Cells, UA 0-2 /HPF      Hyaline Casts, UA None Seen /LPF      Methodology Automated Microscopy            Imaging:  CT Lumbar Spine Without Contrast    Result Date: 9/12/2022  PROCEDURE: CT LUMBAR SPINE WO CONTRAST  COMPARISON: None.  INDICATIONS: fall, midline lumbar back pain  PROTOCOL:   Standard imaging protocol performed    RADIATION:   DLP: 1766.2mGy*cm.   Automated exposure control was utilized to minimize radiation dose.  TECHNIQUE: After obtaining the patient's consent, multi-planar CT images were created without intravenous contrast material.   FINDINGS:  No acute fracture or acute malalignment is identified.  Moderate-to-severe degenerative changes are seen throughout the imaged spine.  There may be diffuse idiopathic skeletal hyperostosis (DISH).  Degenerative changes involve the bilateral sacroiliac joints.  No aggressive osseous lesion is suggested.  There is incidental nonobstructing left nephrolithiasis.  There may be incidental hepatosplenomegaly.  If symptoms or clinical concerns persist, consider imaging follow-up.        No acute fracture or acute malalignment is identified.    Please note that portions of this note were completed with a voice recognition program.  KYREE WATSON JR, MD       Electronically Signed and Approved By: KYREE WATSON JR, MD on 9/12/2022 at 23:18              CT Pelvis Without Contrast    Result Date: 9/12/2022  PROCEDURE: CT PELVIS WO CONTRAST  COMPARISON: 4/25/2019.  INDICATIONS: LEFT HIP PAIN STATUS POST FALL.  PROTOCOL:   Standard imaging protocol performed    RADIATION:   DLP: 1105.9mGy*cm   Automated exposure control was utilized to minimize radiation dose.  TECHNIQUE: After obtaining the patient's consent, 525 CT images were created without intravenous contrast.  Multiplanar imaging was performed.  There is slight motion artifact on the exam.  FINDINGS:  No definite acute fracture or acute malalignment is identified. Degenerative changes are seen within the partially imaged spine.  There may be diffuse idiopathic skeletal hyperostosis (DISH).  Degenerative  changes involve the bilateral hip joints and the bilateral sacroiliac joints (greater on the right).  No aggressive osseous lesion is suggested.  The study is habitus-limited.  There is age-indeterminate but likely chronic subluxation at the level of the 1st and 2nd coccygeal segments with the more caudal coccygeal segments displaced dorsally.  This finding is new since 4/25/2019.  Again, it is thought to be chronic in nature.  No definite associated acute contusion or hemorrhage with this finding.  It is seen on image 86 of series 11 and adjacent images.  Please correlate clinically, especially with the presence of point tenderness at this level.  If symptoms or clinical concerns persist, consider imaging follow-up.        No definite acute fracture or acute malalignment is identified.  Please see above comments for further detail.    Please note that portions of this note were completed with a voice recognition program.  KYREE WATSON JR, MD       Electronically Signed and Approved By: KYREE WATSON JR, MD on 9/12/2022 at 23:26                Procedures:  Procedures    Progress  ED Course as of 09/12/22 2353   Mon Sep 12, 2022   2330 No acute fractures on ct [DS]   2333 States medications had helped until they had to move him a lot for imaging.  Will premedicate with one-time dosing prior to discharge.  Patient is in pain management and cannot have any controlled substances on discharge [DS]      ED Course User Index  [DS] Shantel Pierce APRN                            The patient was initially evaluated in the triage area where orders were placed. The patient was later dispositioned by MIRIAM Jo.      Medical Decision Making:  MDM  Number of Diagnoses or Management Options  Acute cystitis without hematuria  Back strain, initial encounter  Contusion of lower back, initial encounter  Contusion of pelvis, initial encounter  Diagnosis management comments: The patient´s symptoms are consistent with  musculoskeletal back pain. The patient is now resting comfortably, feels better, is alert, talkative, interactive and in no distress. The repeat examination is unremarkable and benign. The patient is neurologically intact and is ambulatory in the ED. The patient has no fever, no bowel or bladder incontinence, no saddle anesthesia, and is otherwise alert and well appearing. The history, physical exam, and diagnostics (if any) do not suggest the presence of acute spinal epidural abscess, acute spinal epidural bleed, cauda equina syndrome, abdominal aortic aneurysm, aortic dissection or other process requiring further testing, treatment or consultation in the emergency department. The vital signs have been stable. The patient's condition is stable and appropriate for discharge. The patient will pursue further outpatient evaluation with the primary care physician or other designated for consulting position as indicated in the discharge instructions.\       Amount and/or Complexity of Data Reviewed  Clinical lab tests: reviewed and ordered  Tests in the radiology section of CPT®: reviewed and ordered  Tests in the medicine section of CPT®: ordered and reviewed    Risk of Complications, Morbidity, and/or Mortality  Presenting problems: moderate  Diagnostic procedures: moderate  Management options: low    Patient Progress  Patient progress: stable       Final diagnoses:   Contusion of lower back, initial encounter   Contusion of pelvis, initial encounter   Back strain, initial encounter   Acute cystitis without hematuria        Disposition:  ED Disposition     ED Disposition   Discharge    Condition   Stable    Comment   --             This medical record created using voice recognition software.           Shantel Pierce, APRN  09/12/22 [id number]

## 2022-09-13 PROCEDURE — 25010000002 LORAZEPAM PER 2 MG: Performed by: EMERGENCY MEDICINE

## 2022-09-13 PROCEDURE — 96375 TX/PRO/DX INJ NEW DRUG ADDON: CPT

## 2022-09-13 PROCEDURE — 25010000002 HYDROMORPHONE 1 MG/ML SOLUTION: Performed by: EMERGENCY MEDICINE

## 2022-09-13 RX ADMIN — LORAZEPAM 1 MG: 2 INJECTION INTRAMUSCULAR; INTRAVENOUS at 00:18

## 2022-09-13 RX ADMIN — HYDROMORPHONE HYDROCHLORIDE 1 MG: 1 INJECTION, SOLUTION INTRAMUSCULAR; INTRAVENOUS; SUBCUTANEOUS at 00:18

## 2022-09-13 NOTE — DISCHARGE INSTRUCTIONS
All of your imaging was negative for any acute fracture or dislocation.    As we discussed your urine did show signs of urinary tract infection that will require some antibiotic you received the first dose in the emergency department.    Continue all home medications for pain that have been prescribed by pain management.    Follow-up with your PCP and pain management doctors for further evaluation and any additional pain management treatment    Return for new/worse symtpoms

## 2022-12-17 ENCOUNTER — LAB REQUISITION (OUTPATIENT)
Dept: LAB | Facility: HOSPITAL | Age: 62
End: 2022-12-17

## 2022-12-17 DIAGNOSIS — E53.8 DEFICIENCY OF OTHER SPECIFIED B GROUP VITAMINS: ICD-10-CM

## 2022-12-17 DIAGNOSIS — E11.65 TYPE 2 DIABETES MELLITUS WITH HYPERGLYCEMIA: ICD-10-CM

## 2022-12-17 DIAGNOSIS — M10.9 GOUT, UNSPECIFIED: ICD-10-CM

## 2022-12-17 LAB
ALBUMIN SERPL-MCNC: 4.2 G/DL (ref 3.5–5.2)
ALBUMIN/GLOB SERPL: 1.5 G/DL
ALP SERPL-CCNC: 51 U/L (ref 39–117)
ALT SERPL W P-5'-P-CCNC: 14 U/L (ref 1–41)
ANION GAP SERPL CALCULATED.3IONS-SCNC: 11.3 MMOL/L (ref 5–15)
AST SERPL-CCNC: 16 U/L (ref 1–40)
BASOPHILS # BLD AUTO: 0.02 10*3/MM3 (ref 0–0.2)
BASOPHILS NFR BLD AUTO: 0.3 % (ref 0–1.5)
BILIRUB SERPL-MCNC: 0.5 MG/DL (ref 0–1.2)
BUN SERPL-MCNC: 11 MG/DL (ref 8–23)
BUN/CREAT SERPL: 11.7 (ref 7–25)
CALCIUM SPEC-SCNC: 9.5 MG/DL (ref 8.6–10.5)
CHLORIDE SERPL-SCNC: 101 MMOL/L (ref 98–107)
CHOLEST SERPL-MCNC: 145 MG/DL (ref 0–200)
CO2 SERPL-SCNC: 27.7 MMOL/L (ref 22–29)
CREAT SERPL-MCNC: 0.94 MG/DL (ref 0.76–1.27)
DEPRECATED RDW RBC AUTO: 52.2 FL (ref 37–54)
EGFRCR SERPLBLD CKD-EPI 2021: 91.7 ML/MIN/1.73
EOSINOPHIL # BLD AUTO: 0.21 10*3/MM3 (ref 0–0.4)
EOSINOPHIL NFR BLD AUTO: 3.4 % (ref 0.3–6.2)
ERYTHROCYTE [DISTWIDTH] IN BLOOD BY AUTOMATED COUNT: 15.8 % (ref 12.3–15.4)
FOLATE SERPL-MCNC: >20 NG/ML (ref 4.78–24.2)
GLOBULIN UR ELPH-MCNC: 2.8 GM/DL
GLUCOSE SERPL-MCNC: 136 MG/DL (ref 65–99)
HBA1C MFR BLD: 7.2 % (ref 4.8–5.6)
HCT VFR BLD AUTO: 40.7 % (ref 37.5–51)
HDLC SERPL-MCNC: 29 MG/DL (ref 40–60)
HGB BLD-MCNC: 13.3 G/DL (ref 13–17.7)
IMM GRANULOCYTES # BLD AUTO: 0.01 10*3/MM3 (ref 0–0.05)
IMM GRANULOCYTES NFR BLD AUTO: 0.2 % (ref 0–0.5)
LDLC SERPL CALC-MCNC: 98 MG/DL (ref 0–100)
LDLC/HDLC SERPL: 3.36 {RATIO}
LYMPHOCYTES # BLD AUTO: 0.54 10*3/MM3 (ref 0.7–3.1)
LYMPHOCYTES NFR BLD AUTO: 8.8 % (ref 19.6–45.3)
MCH RBC QN AUTO: 29.8 PG (ref 26.6–33)
MCHC RBC AUTO-ENTMCNC: 32.7 G/DL (ref 31.5–35.7)
MCV RBC AUTO: 91.3 FL (ref 79–97)
MONOCYTES # BLD AUTO: 0.66 10*3/MM3 (ref 0.1–0.9)
MONOCYTES NFR BLD AUTO: 10.8 % (ref 5–12)
NEUTROPHILS NFR BLD AUTO: 4.69 10*3/MM3 (ref 1.7–7)
NEUTROPHILS NFR BLD AUTO: 76.5 % (ref 42.7–76)
NRBC BLD AUTO-RTO: 0 /100 WBC (ref 0–0.2)
PLATELET # BLD AUTO: 248 10*3/MM3 (ref 140–450)
PMV BLD AUTO: 10.5 FL (ref 6–12)
POTASSIUM SERPL-SCNC: 4.8 MMOL/L (ref 3.5–5.2)
PROT SERPL-MCNC: 7 G/DL (ref 6–8.5)
RBC # BLD AUTO: 4.46 10*6/MM3 (ref 4.14–5.8)
SODIUM SERPL-SCNC: 140 MMOL/L (ref 136–145)
T4 FREE SERPL-MCNC: 1.29 NG/DL (ref 0.93–1.7)
TRIGL SERPL-MCNC: 93 MG/DL (ref 0–150)
TSH SERPL DL<=0.05 MIU/L-ACNC: 2.56 UIU/ML (ref 0.27–4.2)
URATE SERPL-MCNC: 6.3 MG/DL (ref 3.4–7)
VIT B12 BLD-MCNC: 598 PG/ML (ref 211–946)
VLDLC SERPL-MCNC: 18 MG/DL (ref 5–40)
WBC NRBC COR # BLD: 6.13 10*3/MM3 (ref 3.4–10.8)

## 2022-12-17 PROCEDURE — 84550 ASSAY OF BLOOD/URIC ACID: CPT | Performed by: PHYSICIAN ASSISTANT

## 2022-12-17 PROCEDURE — 80053 COMPREHEN METABOLIC PANEL: CPT | Performed by: PHYSICIAN ASSISTANT

## 2022-12-17 PROCEDURE — 82607 VITAMIN B-12: CPT | Performed by: PHYSICIAN ASSISTANT

## 2022-12-17 PROCEDURE — 85025 COMPLETE CBC W/AUTO DIFF WBC: CPT | Performed by: PHYSICIAN ASSISTANT

## 2022-12-17 PROCEDURE — 84439 ASSAY OF FREE THYROXINE: CPT | Performed by: PHYSICIAN ASSISTANT

## 2022-12-17 PROCEDURE — 80061 LIPID PANEL: CPT | Performed by: PHYSICIAN ASSISTANT

## 2022-12-17 PROCEDURE — 82746 ASSAY OF FOLIC ACID SERUM: CPT | Performed by: PHYSICIAN ASSISTANT

## 2022-12-17 PROCEDURE — 84443 ASSAY THYROID STIM HORMONE: CPT | Performed by: PHYSICIAN ASSISTANT

## 2022-12-17 PROCEDURE — 83036 HEMOGLOBIN GLYCOSYLATED A1C: CPT | Performed by: PHYSICIAN ASSISTANT

## 2022-12-27 ENCOUNTER — OFFICE VISIT (OUTPATIENT)
Dept: PODIATRY | Facility: CLINIC | Age: 62
End: 2022-12-27

## 2022-12-27 VITALS
TEMPERATURE: 96.6 F | WEIGHT: 315 LBS | BODY MASS INDEX: 37.19 KG/M2 | DIASTOLIC BLOOD PRESSURE: 74 MMHG | SYSTOLIC BLOOD PRESSURE: 151 MMHG | HEIGHT: 77 IN | OXYGEN SATURATION: 93 % | HEART RATE: 86 BPM

## 2022-12-27 DIAGNOSIS — R26.2 DIFFICULTY WALKING: ICD-10-CM

## 2022-12-27 DIAGNOSIS — E11.9 NON-INSULIN DEPENDENT TYPE 2 DIABETES MELLITUS: ICD-10-CM

## 2022-12-27 DIAGNOSIS — L60.0 ONYCHOCRYPTOSIS: ICD-10-CM

## 2022-12-27 DIAGNOSIS — M79.672 FOOT PAIN, BILATERAL: Primary | ICD-10-CM

## 2022-12-27 DIAGNOSIS — B35.1 ONYCHOMYCOSIS: ICD-10-CM

## 2022-12-27 DIAGNOSIS — E11.8 DIABETIC FOOT: ICD-10-CM

## 2022-12-27 DIAGNOSIS — M79.671 FOOT PAIN, BILATERAL: Primary | ICD-10-CM

## 2022-12-27 PROCEDURE — G8404 LOW EXTEMITY NEUR EXAM DOCUM: HCPCS | Performed by: PODIATRIST

## 2022-12-27 PROCEDURE — 11721 DEBRIDE NAIL 6 OR MORE: CPT | Performed by: PODIATRIST

## 2022-12-27 RX ORDER — GABAPENTIN 600 MG/1
600 TABLET ORAL 3 TIMES DAILY
COMMUNITY
Start: 2022-11-29

## 2022-12-27 RX ORDER — DIGOXIN 250 MCG
250 TABLET ORAL DAILY
COMMUNITY
Start: 2022-12-09

## 2022-12-27 RX ORDER — CEFDINIR 300 MG/1
300 CAPSULE ORAL 2 TIMES DAILY
COMMUNITY
Start: 2022-12-19 | End: 2023-04-03

## 2022-12-27 RX ORDER — CETIRIZINE HYDROCHLORIDE 10 MG/1
10 CAPSULE, LIQUID FILLED ORAL DAILY
COMMUNITY

## 2022-12-27 RX ORDER — DILTIAZEM HYDROCHLORIDE 180 MG/1
180 CAPSULE, EXTENDED RELEASE ORAL DAILY
COMMUNITY
Start: 2022-12-09

## 2022-12-27 RX ORDER — ROSEMARY OIL
OIL (ML) MISCELLANEOUS
COMMUNITY

## 2022-12-27 RX ORDER — METOPROLOL SUCCINATE 100 MG/1
TABLET, EXTENDED RELEASE ORAL
COMMUNITY

## 2022-12-27 RX ORDER — GLIMEPIRIDE 4 MG/1
4 TABLET ORAL
COMMUNITY
Start: 2022-12-09

## 2022-12-27 RX ORDER — CYANOCOBALAMIN (VITAMIN B-12) 3000MCG/ML
DROPS SUBLINGUAL DAILY
COMMUNITY

## 2022-12-27 RX ORDER — LANCETS 28 GAUGE
1 EACH MISCELLANEOUS
COMMUNITY

## 2022-12-27 RX ORDER — FLUTICASONE PROPIONATE 50 MCG
1 SPRAY, SUSPENSION (ML) NASAL
COMMUNITY

## 2022-12-27 RX ORDER — GABAPENTIN 300 MG/1
CAPSULE ORAL
COMMUNITY
Start: 2022-11-29

## 2022-12-27 RX ORDER — ALBUTEROL SULFATE 90 UG/1
2 AEROSOL, METERED RESPIRATORY (INHALATION) EVERY 6 HOURS PRN
COMMUNITY
Start: 2022-12-13

## 2022-12-27 RX ORDER — POTASSIUM CHLORIDE 750 MG/1
10 TABLET, FILM COATED, EXTENDED RELEASE ORAL DAILY
COMMUNITY
Start: 2022-10-16

## 2022-12-27 RX ORDER — TURMERIC 400 MG
400 CAPSULE ORAL DAILY
COMMUNITY

## 2022-12-27 RX ORDER — HYDROCODONE BITARTRATE AND ACETAMINOPHEN 5; 325 MG/1; MG/1
1 TABLET ORAL EVERY 12 HOURS PRN
COMMUNITY
Start: 2022-11-29

## 2022-12-27 NOTE — PROGRESS NOTES
[facility] - PODIATRY    Today's Date: 12/27/22    Patient Name: Niall Mak  MRN: [id number]  CSN: [id number]  PCP: Shai Azar PA, Last PCP Visit:  12/20/2022  Referring Provider: No ref. provider found    SUBJECTIVE     Chief Complaint   Patient presents with   • Left Foot - Follow-up, Nail Problem     Left great toenail fell off   Last seen 11/2020   • Right Foot - Follow-up, Nail Problem     Last seen 11/2020     HPI: Niall Mak, a 62 y.o.male, presents to clinic for painful toenail and a diabetic foot evaluation.    New, Established, New Problem:  Established  Location:  Toenails  Duration:   Greater than five years  Onset:  Gradual  Nature:  sore with palpation.  Stable, worsening, improving:   worsening  Aggravating factors:  Pain with shoe gear and ambulation.  Previous Treatment: Unable to trim their own toenails.    Patient controlling diabetes via: Oral medication    Patient states their last blood glucose was:  159    Patient denies any fevers, chills, nausea, vomiting, shortness of breath, nor any other constitutional signs nor symptoms.    No other pedal complaints at this time.    Past Medical History:   Diagnosis Date   • A-fib ([facility])    • Callus    • Cellulitis    • CHF (congestive heart failure) ([facility])    • Coronary artery disease    • Diabetes mellitus ([facility])    • Difficulty walking    • Gout    • Myocardial infarction ([facility])    • Neuropathy    • Neuropathy in diabetes ([facility]) 5 years ago     Past Surgical History:   Procedure Laterality Date   • COLONOSCOPY     • TONSILLECTOMY       Family History   Family history unknown: Yes     Social History     Socioeconomic History   • Marital status:    Tobacco Use   • Smoking status: Never   • Smokeless tobacco: Never   Vaping Use   • Vaping Use: Never used   Substance and Sexual Activity   • Alcohol use: Not Currently   • Drug use: Never   • Sexual activity: Not Currently     Partners: Female      Allergies   Allergen Reactions   • Cucumber Extract Unknown - High Severity and Other (See Comments)   • Influenza Virus Vaccine Unknown - High Severity   • Sitagliptin Unknown - High Severity   • [place] Unknown - High Severity   • Tomato Unknown - High Severity and Other (See Comments)     Current Outpatient Medications   Medication Sig Dispense Refill   • albuterol sulfate  (90 Base) MCG/ACT inhaler Inhale 2 puffs Every 6 (Six) Hours As Needed.     • allopurinol (ZYLOPRIM) 300 MG tablet Take 300 mg by mouth Daily.     • ascorbic acid (VITAMIN C) 500 MG capsule controlled-release CR capsule Take 500 mg by mouth Daily.     • cefdinir (OMNICEF) 300 MG capsule Take 300 mg by mouth 2 (Two) Times a Day. for 14 days     • Cetirizine HCl (ZyrTEC Allergy) 10 MG capsule Take 10 mg by mouth Daily.     • Cinnamon 500 MG capsule Take 500 mg by mouth 2 (two) times a day.     • Cyanocobalamin (Vitamin B12) 3000 MCG/ML liquid Place  under the tongue Daily. Liquid     • digoxin (LANOXIN) 250 MCG tablet Take 250 mcg by mouth Daily.     • dilTIAZem (TIAZAC) 180 MG 24 hr capsule Take 180 mg by mouth Daily.     • dilTIAZem CD (CARDIZEM CD) 180 MG 24 hr capsule Take 180 mg by mouth Daily.     • fluticasone (FLONASE) 50 MCG/ACT nasal spray 1 spray into the nostril(s) as directed by provider.     • gabapentin (NEURONTIN) 300 MG capsule TAKE 1 CAPSULE BY MOUTH THREE TIMES DAILY AS DIRECTED     • gabapentin (NEURONTIN) 600 MG tablet Take 600 mg by mouth 3 (Three) Times a Day.     • glimepiride (AMARYL) 4 MG tablet Take 4 mg by mouth.     • glucose blood test strip 1 each by Other route.     • Homeopathic Products (Frankincense Uplifting) oil Apply  topically to the appropriate area as directed.     • HYDROcodone-acetaminophen (NORCO) 5-325 MG per tablet Take 1 tablet by mouth Every 12 (Twelve) Hours As Needed.     • Lancets (freestyle) lancets 1 each by Other route.     • metFORMIN (GLUCOPHAGE) 500 MG tablet Take 2 tablets  twice a day     • metoprolol succinate XL (TOPROL-XL) 100 MG 24 hr tablet metoprolol succinate  mg tablet,extended release 24 hr   TAKE 1 TABLET BY MOUTH DAILY     • potassium chloride 10 MEQ CR tablet Take 10 mEq by mouth Daily.     • Rosemary Oil oil      • silver sulfadiazine (SILVADENE, SSD) 1 % cream Apply  topically to the appropriate area as directed Daily.     • spironolactone (ALDACTONE) 25 MG tablet Take 25 mg by mouth Daily.     • Turmeric 400 MG capsule Take 400 mg by mouth Daily.     • Cyanocobalamin 2500 MCG sublingual tablet Place 2,500 mcg under the tongue Daily.     • rivaroxaban (XARELTO) 20 MG tablet Take 20 mg by mouth Daily.       No current facility-administered medications for this visit.     Review of Systems   Constitutional: Negative.  Negative for fatigue.   Cardiovascular: Negative for chest pain.   Endocrine: Negative for polydipsia, polyphagia and polyuria.   Skin: Negative for pallor.        Painful toenails   Neurological: Positive for numbness. Negative for dizziness, tremors, seizures, speech difficulty, weakness and headaches.   Psychiatric/Behavioral: Negative for confusion. The patient is not nervous/anxious.    All other systems reviewed and are negative.      OBJECTIVE     Vitals:    12/27/22 1514   BP: 151/74   Pulse: 86   Temp: 96.6 °F (35.9 °C)   SpO2: 93%       Body mass index is 61.43 kg/m².    Lab Results   Component Value Date    HGBA1C 7.20 (H) 12/17/2022       Lab Results   Component Value Date    GLUCOSE 136 (H) 12/17/2022    CALCIUM 9.5 12/17/2022     12/17/2022    K 4.8 12/17/2022    CO2 27.7 12/17/2022     12/17/2022    BUN 11 12/17/2022    CREATININE 0.94 12/17/2022    EGFRIFNONA 94 08/01/2021    BCR 11.7 12/17/2022    ANIONGAP 11.3 12/17/2022       Patient seen in no apparent distress.      PHYSICAL EXAM:     Foot/Ankle Exam:       General:   Diabetic Foot Exam Performed    Appearance comment:  Morbidly obese obesity, chronically  ill  Orientation: AAOx3    Affect: appropriate    Assistance: cane    Shoe Gear:  Diabetic shoes    VASCULAR      Right Foot Vascularity   Normal vascular exam    Dorsalis pedis:  Absent (Palpable due to lower extremity edema)  Posterior tibial:  Absent  Skin Temperature: warm    Edema Grading:  3+ and pitting  CFT:  < 3 seconds  Pedal Hair Growth:  Absent  Varicosities: moderate varicosities       Left Foot Vascularity   Normal vascular exam    Dorsalis pedis:  Absent (Nonpalpable due to lower extremity edema)  Posterior tibial:  Absent  Skin Temperature: warm    Edema Grading:  3+ and pitting  CFT:  < 3 seconds  Pedal Hair Growth:  Absent  Varicosities: moderate varicosities        NEUROLOGIC     Right Foot Neurologic   Light touch sensation:  Diminished  Vibratory sensation:  Diminished  Hot/Cold sensation: diminished    Protective Sensation using [place]Marie Monofilament:  2     Left Foot Neurologic   Light touch sensation:  Diminished  Vibratory sensation:  Diminished  Hot/cold sensation: diminished    Protective Sensation using [place]Marie Monofilament:  2     MUSCLE STRENGTH     Right Foot Muscle Strength   Foot dorsiflexion:  4-  Foot plantar flexion:  4-  Foot inversion:  4-  Foot eversion:  4-     Left Foot Muscle Strength   Foot dorsiflexion:  4-  Foot plantar flexion:  4-  Foot inversion:  4-  Foot eversion:  4-     RANGE OF MOTION      Right Foot Range of Motion   Foot and ankle ROM within normal limits       Left Foot Range of Motion   Foot and ankle ROM within normal limits       DERMATOLOGIC     Right Foot Dermatologic   Skin: skin intact    Nails: onychomycosis, abnormally thick, subungual debris and dystrophic nails    Nails comment:  Toenails 1, 2, 3, 4, and 5     Left Foot Dermatologic   Skin: skin intact    Nails: onychomycosis, abnormally thick, subungual debris, dystrophic nails and ingrown toenail    Nails comment:  Toenails 2, 3, 4, and 5      Diabetic Foot Exam  Performed      ASSESSMENT/PLAN     Diagnoses and all orders for this visit:    1. Foot pain, bilateral (Primary)    2. Onychomycosis    3. Onychocryptosis    4. Non-insulin dependent type 2 diabetes mellitus (HCC)    5. Diabetic foot (HCC)    6. Difficulty walking        Comprehensive lower extremity examination and evaluation was performed.    Discussed findings and treatment plan including risks, benefits, and treatment options with patient in detail. Patient agreed with treatment plan.    Medications and allergies reviewed.  Reviewed available blood glucose and HgB A1C lab values along with other pertinent labs.  These were discussed with the patient as to their importance of diabetic maintenance.    Toenails 1, 2, 3, 4, 5 on Right and 2, 3, 4, 5 on Left were debrided with nail nippers then filed with a [facility] nail silvia.  Patient tolerated procedure well without complications.    Diabetic foot exam performed and documented this date, compliant with CQM required standards. Detail of findings as noted in physical exam.  Lower extremity Neurologic exam for diabetic patient performed and documented this date, compliant with PQRS required standards. Detail of findings as noted in physical exam.  Advised patient importance of good routine lower extremity hygiene. Advised patient importance of evaluating for intact skin and pain free nail borders.  Advised patient to use mirror to evaluate plantar/ soles of feet for better visualization. Advised patient monitor and phone office to be seen if any cracking to skin, open lesions, painful nail borders or if nails become elongated prior to next visit. Advised patient importance of daily cleansing of lower extremities, followed by good skin cream to maintain normal hydration of skin. Also advised patient importance of close daily monitoring of blood sugar. Advised to regulate diet and medications to maintain control of blood sugar in optimal range. Contact primary care  provider if difficulties maintaining blood sugar levels.  Advised Patient of presence of Diabetes Mellitus condition.  Advised Patient risk of progression and worsening or improvement, then return of condition.  Will monitor condition for any change in future. Treat with most appropriate treatment pending status of condition.  Counseled and advised patient extensively on nature and ramifications of diabetes. Standard instructions given to patient for good diabetic foot care and maintenance. Advised importance of careful monitoring to avoid break down and complications secondary to diabetes. Advised patient importance of strict maintenance of blood sugar control. Advised patient of possible ominous results from neglect of condition, i.e.: amputation/ loss of digits, feet and legs, or even death.  Patient states understands counseling, will monitor closely, continue good hygiene and routine diabetic foot care. Patient will contact office should they have any questions or problems.      An After Visit Summary was printed and given to the patient at discharge, including (if requested) any available informative/educational handouts regarding diagnosis, treatment, or medications. All questions were answered to patient/family satisfaction. Should symptoms fail to improve or worsen they agree to call or return to clinic or to go to the Emergency Department. Discussed the importance of following up with any needed screening tests/labs/specialist appointments and any requested follow-up recommended by me today. Importance of maintaining follow-up discussed and patient accepts that missed appointments can delay diagnosis and potentially lead to worsening of conditions.    Return in about 9 weeks (around 2/28/2023) for Toenail Care., or sooner if acute issues arise.    This document has been electronically signed by Des Ahumada DPM on December 27, 2022 15:37 EST

## 2023-04-03 ENCOUNTER — OFFICE VISIT (OUTPATIENT)
Dept: PODIATRY | Facility: CLINIC | Age: 63
End: 2023-04-03
Payer: MEDICARE

## 2023-04-03 VITALS
BODY MASS INDEX: 37.19 KG/M2 | WEIGHT: 315 LBS | TEMPERATURE: 96.4 F | HEIGHT: 77 IN | DIASTOLIC BLOOD PRESSURE: 85 MMHG | OXYGEN SATURATION: 94 % | SYSTOLIC BLOOD PRESSURE: 143 MMHG | HEART RATE: 73 BPM

## 2023-04-03 DIAGNOSIS — R26.2 DIFFICULTY WALKING: ICD-10-CM

## 2023-04-03 DIAGNOSIS — L60.0 ONYCHOCRYPTOSIS: ICD-10-CM

## 2023-04-03 DIAGNOSIS — E11.9 NON-INSULIN DEPENDENT TYPE 2 DIABETES MELLITUS: ICD-10-CM

## 2023-04-03 DIAGNOSIS — E11.8 DIABETIC FOOT: ICD-10-CM

## 2023-04-03 DIAGNOSIS — M79.671 FOOT PAIN, BILATERAL: Primary | ICD-10-CM

## 2023-04-03 DIAGNOSIS — M79.672 FOOT PAIN, BILATERAL: Primary | ICD-10-CM

## 2023-04-03 DIAGNOSIS — B35.1 ONYCHOMYCOSIS: ICD-10-CM

## 2023-04-03 PROCEDURE — 1160F RVW MEDS BY RX/DR IN RCRD: CPT | Performed by: PODIATRIST

## 2023-04-03 PROCEDURE — 11721 DEBRIDE NAIL 6 OR MORE: CPT | Performed by: PODIATRIST

## 2023-04-03 PROCEDURE — G8404 LOW EXTEMITY NEUR EXAM DOCUM: HCPCS | Performed by: PODIATRIST

## 2023-04-03 PROCEDURE — 1159F MED LIST DOCD IN RCRD: CPT | Performed by: PODIATRIST

## 2023-04-03 RX ORDER — ASPIRIN 81 MG/1
81 TABLET ORAL DAILY
COMMUNITY

## 2023-04-03 NOTE — PROGRESS NOTES
[facility] - PODIATRY    Today's Date: 04/03/23    Patient Name: Niall Mak  MRN: [id number]  CSN: [id number]  PCP: Shai Azar PA, Last PCP Visit:  2/2/2023  Referring Provider: No ref. provider found    SUBJECTIVE     Chief Complaint   Patient presents with   • Left Foot - Follow-up, Nail Problem   • Right Foot - Follow-up, Nail Problem     HPI: Niall Mak, a 62 y.o.male, presents to clinic for painful toenail and a diabetic foot evaluation.    New, Established, New Problem:  Established  Location:  Toenails  Duration:   Greater than five years  Onset:  Gradual  Nature:  sore with palpation.  Stable, worsening, improving:   worsening  Aggravating factors:  Pain with shoe gear and ambulation.  Previous Treatment: Unable to trim their own toenails.    Patient controlling diabetes via: Oral medication    Patient denies any fevers, chills, nausea, vomiting, shortness of breath, nor any other constitutional signs nor symptoms.    Medical changes: P.o. antibiotics for a infection in his leg.    Past Medical History:   Diagnosis Date   • A-fib    • Callus    • Cellulitis    • CHF (congestive heart failure)    • Coronary artery disease    • Diabetes mellitus    • Difficulty walking    • Gout    • Myocardial infarction    • Neuropathy    • Neuropathy in diabetes 5 years ago     Past Surgical History:   Procedure Laterality Date   • COLONOSCOPY     • TONSILLECTOMY       Family History   Problem Relation Age of Onset   • Cancer Father      Social History     Socioeconomic History   • Marital status:    Tobacco Use   • Smoking status: Never   • Smokeless tobacco: Never   Vaping Use   • Vaping Use: Never used   Substance and Sexual Activity   • Alcohol use: Not Currently   • Drug use: Never   • Sexual activity: Not Currently     Partners: Female     Allergies   Allergen Reactions   • Cucumber Extract Unknown - High Severity and Other (See Comments)   • Influenza Virus  Vaccine Unknown - High Severity   • Sitagliptin Unknown - High Severity   • [place] Unknown - High Severity   • Tomato Unknown - High Severity and Other (See Comments)     Current Outpatient Medications   Medication Sig Dispense Refill   • albuterol sulfate  (90 Base) MCG/ACT inhaler Inhale 2 puffs Every 6 (Six) Hours As Needed.     • allopurinol (ZYLOPRIM) 300 MG tablet Take 1 tablet by mouth Daily.     • ascorbic acid (VITAMIN C) 500 MG capsule controlled-release CR capsule Take 1 capsule by mouth Daily.     • aspirin 81 MG EC tablet Take 1 tablet by mouth Daily.     • Cetirizine HCl (ZyrTEC Allergy) 10 MG capsule Take 10 mg by mouth Daily.     • Cinnamon 500 MG capsule Take 1 capsule by mouth 2 (two) times a day.     • Cyanocobalamin (Vitamin B12) 3000 MCG/ML liquid Place  under the tongue Daily. Liquid     • digoxin (LANOXIN) 250 MCG tablet Take 1 tablet by mouth Daily.     • dilTIAZem (TIAZAC) 180 MG 24 hr capsule Take 1 capsule by mouth Daily.     • dilTIAZem CD (CARDIZEM CD) 180 MG 24 hr capsule Take 1 capsule by mouth Daily.     • fluticasone (FLONASE) 50 MCG/ACT nasal spray 1 spray into the nostril(s) as directed by provider.     • gabapentin (NEURONTIN) 300 MG capsule TAKE 1 CAPSULE BY MOUTH THREE TIMES DAILY AS DIRECTED     • gabapentin (NEURONTIN) 600 MG tablet Take 1 tablet by mouth 3 (Three) Times a Day.     • glimepiride (AMARYL) 4 MG tablet Take 1 tablet by mouth.     • glucose blood test strip 1 each by Other route.     • Homeopathic Products (Frankincense Uplifting) oil Apply  topically to the appropriate area as directed.     • HYDROcodone-acetaminophen (NORCO) 5-325 MG per tablet Take 1 tablet by mouth Every 12 (Twelve) Hours As Needed.     • Lancets (freestyle) lancets 1 each by Other route.     • metFORMIN (GLUCOPHAGE) 500 MG tablet Take 2 tablets twice a day     • metoprolol succinate XL (TOPROL-XL) 100 MG 24 hr tablet metoprolol succinate  mg tablet,extended release 24 hr    TAKE 1 TABLET BY MOUTH DAILY     • potassium chloride 10 MEQ CR tablet Take 1 tablet by mouth Daily.     • Rosemary Oil oil      • silver sulfadiazine (SILVADENE, SSD) 1 % cream Apply  topically to the appropriate area as directed Daily.     • spironolactone (ALDACTONE) 25 MG tablet Take 1 tablet by mouth Daily.     • Turmeric 400 MG capsule Take 400 mg by mouth Daily.       No current facility-administered medications for this visit.     Review of Systems   Constitutional: Negative.  Negative for fatigue.   Cardiovascular: Negative for chest pain.   Endocrine: Negative for polydipsia, polyphagia and polyuria.   Skin: Negative for pallor.        Painful toenails   Neurological: Positive for numbness. Negative for dizziness, tremors, seizures, speech difficulty, weakness and headaches.   Psychiatric/Behavioral: Negative for confusion. The patient is not nervous/anxious.    All other systems reviewed and are negative.      OBJECTIVE     Vitals:    04/03/23 1504   BP: 143/85   Pulse: 73   Temp: 96.4 °F (35.8 °C)   SpO2: 94%       Body mass index is 60.48 kg/m².    Lab Results   Component Value Date    HGBA1C 7.20 (H) 12/17/2022       Lab Results   Component Value Date    GLUCOSE 136 (H) 12/17/2022    CALCIUM 9.5 12/17/2022     12/17/2022    K 4.8 12/17/2022    CO2 27.7 12/17/2022     12/17/2022    BUN 11 12/17/2022    CREATININE 0.94 12/17/2022    EGFRIFNONA 94 08/01/2021    BCR 11.7 12/17/2022    ANIONGAP 11.3 12/17/2022       Patient seen in no apparent distress.      PHYSICAL EXAM:     Foot/Ankle Exam:       General:   Appearance comment:  Morbidly obese obesity, chronically ill  Orientation: AAOx3    Affect: appropriate    Assistance: cane    Shoe Gear:  Diabetic shoes    VASCULAR      Right Foot Vascularity   Normal vascular exam    Dorsalis pedis:  Absent (Palpable due to lower extremity edema)  Posterior tibial:  Absent  Skin Temperature: warm    Edema Grading:  Pitting and 4+  CFT:  < 3  seconds  Pedal Hair Growth:  Absent  Varicosities: moderate varicosities       Left Foot Vascularity   Normal vascular exam    Dorsalis pedis:  Absent (Nonpalpable due to lower extremity edema)  Posterior tibial:  Absent  Skin Temperature: warm    Edema Grading:  Pitting and 4+  CFT:  < 3 seconds  Pedal Hair Growth:  Absent  Varicosities: moderate varicosities        NEUROLOGIC     Right Foot Neurologic   Light touch sensation:  Diminished  Vibratory sensation:  Diminished  Hot/Cold sensation: diminished    Protective Sensation using [place]Marie Monofilament:  2     Left Foot Neurologic   Light touch sensation:  Diminished  Vibratory sensation:  Diminished  Hot/cold sensation: diminished    Protective Sensation using [place]Marie Monofilament:  2     MUSCLE STRENGTH     Right Foot Muscle Strength   Foot dorsiflexion:  4-  Foot plantar flexion:  4-  Foot inversion:  4-  Foot eversion:  4-     Left Foot Muscle Strength   Foot dorsiflexion:  4-  Foot plantar flexion:  4-  Foot inversion:  4-  Foot eversion:  4-     RANGE OF MOTION      Right Foot Range of Motion   Foot and ankle ROM within normal limits       Left Foot Range of Motion   Foot and ankle ROM within normal limits       DERMATOLOGIC     Right Foot Dermatologic   Skin: skin intact    Nails: onychomycosis, abnormally thick, subungual debris and dystrophic nails    Nails comment:  Toenails 1, 2, 3, 4, and 5     Left Foot Dermatologic   Skin: skin intact    Nails: onychomycosis, abnormally thick, subungual debris, dystrophic nails and ingrown toenail    Nails comment:  Toenails 2, 3, 4, and 5      Diabetic Foot Exam Performed      ASSESSMENT/PLAN     Diagnoses and all orders for this visit:    1. Foot pain, bilateral (Primary)    2. Non-insulin dependent type 2 diabetes mellitus    3. Onychomycosis    4. Diabetic foot    5. Onychocryptosis    6. Difficulty walking        Comprehensive lower extremity examination and evaluation was  performed.    Discussed findings and treatment plan including risks, benefits, and treatment options with patient in detail. Patient agreed with treatment plan.    Medications and allergies reviewed.  Reviewed available blood glucose and HgB A1C lab values along with other pertinent labs.  These were discussed with the patient as to their importance of diabetic maintenance.    Toenails 1, 2, 3, 4, 5 on Right and 2, 3, 4, 5 on Left were debrided with nail nippers then filed with a [employer] nail silvia.  Patient tolerated procedure well without complications.    Diabetic foot exam performed and documented this date, compliant with CQM required standards. Detail of findings as noted in physical exam.  Lower extremity Neurologic exam for diabetic patient performed and documented this date, compliant with PQRS required standards. Detail of findings as noted in physical exam.  Advised patient importance of good routine lower extremity hygiene. Advised patient importance of evaluating for intact skin and pain free nail borders.  Advised patient to use mirror to evaluate plantar/ soles of feet for better visualization. Advised patient monitor and phone office to be seen if any cracking to skin, open lesions, painful nail borders or if nails become elongated prior to next visit. Advised patient importance of daily cleansing of lower extremities, followed by good skin cream to maintain normal hydration of skin. Also advised patient importance of close daily monitoring of blood sugar. Advised to regulate diet and medications to maintain control of blood sugar in optimal range. Contact primary care provider if difficulties maintaining blood sugar levels.  Advised Patient of presence of Diabetes Mellitus condition.  Advised Patient risk of progression and worsening or improvement, then return of condition.  Will monitor condition for any change in future. Treat with most appropriate treatment pending status of condition.   Counseled and advised patient extensively on nature and ramifications of diabetes. Standard instructions given to patient for good diabetic foot care and maintenance. Advised importance of careful monitoring to avoid break down and complications secondary to diabetes. Advised patient importance of strict maintenance of blood sugar control. Advised patient of possible ominous results from neglect of condition, i.e.: amputation/ loss of digits, feet and legs, or even death.  Patient states understands counseling, will monitor closely, continue good hygiene and routine diabetic foot care. Patient will contact office should they have any questions or problems.      An After Visit Summary was printed and given to the patient at discharge, including (if requested) any available informative/educational handouts regarding diagnosis, treatment, or medications. All questions were answered to patient/family satisfaction. Should symptoms fail to improve or worsen they agree to call or return to clinic or to go to the Emergency Department. Discussed the importance of following up with any needed screening tests/labs/specialist appointments and any requested follow-up recommended by me today. Importance of maintaining follow-up discussed and patient accepts that missed appointments can delay diagnosis and potentially lead to worsening of conditions.    Return in about 9 weeks (around 6/5/2023) for Toenail Care., or sooner if acute issues arise.    This document has been electronically signed by Des Ahumada DPM on April 3, 2023 15:23 EDT

## 2023-07-07 PROBLEM — I48.91 ATRIAL FIBRILLATION WITH RAPID VENTRICULAR RESPONSE: Status: ACTIVE | Noted: 2023-07-07

## 2023-10-12 ENCOUNTER — TRANSCRIBE ORDERS (OUTPATIENT)
Dept: DIABETES SERVICES | Facility: HOSPITAL | Age: 63
End: 2023-10-12
Payer: MEDICARE

## 2023-10-12 DIAGNOSIS — E11.65 TYPE 2 DIABETES MELLITUS WITH HYPERGLYCEMIA, WITHOUT LONG-TERM CURRENT USE OF INSULIN: Primary | ICD-10-CM

## 2023-10-29 ENCOUNTER — APPOINTMENT (OUTPATIENT)
Dept: CT IMAGING | Facility: HOSPITAL | Age: 63
End: 2023-10-29
Payer: MEDICARE

## 2023-10-29 ENCOUNTER — HOSPITAL ENCOUNTER (OUTPATIENT)
Facility: HOSPITAL | Age: 63
Setting detail: OBSERVATION
Discharge: HOME HEALTH | End: 2023-10-30
Attending: EMERGENCY MEDICINE | Admitting: INTERNAL MEDICINE
Payer: MEDICARE

## 2023-10-29 ENCOUNTER — APPOINTMENT (OUTPATIENT)
Dept: MRI IMAGING | Facility: HOSPITAL | Age: 63
End: 2023-10-29
Payer: MEDICARE

## 2023-10-29 DIAGNOSIS — R26.2 DIFFICULTY WALKING: ICD-10-CM

## 2023-10-29 DIAGNOSIS — H49.21 RIGHT ABDUCENS NERVE PALSY: Primary | ICD-10-CM

## 2023-10-29 DIAGNOSIS — R26.81 UNSTABLE GAIT: ICD-10-CM

## 2023-10-29 DIAGNOSIS — Z78.9 DECREASED ACTIVITIES OF DAILY LIVING (ADL): ICD-10-CM

## 2023-10-29 PROBLEM — H49.20 6TH NERVE PALSY: Status: ACTIVE | Noted: 2023-10-29

## 2023-10-29 LAB
ALBUMIN SERPL-MCNC: 3.8 G/DL (ref 3.5–5.2)
ALBUMIN/GLOB SERPL: 1 G/DL
ALP SERPL-CCNC: 54 U/L (ref 39–117)
ALT SERPL W P-5'-P-CCNC: 12 U/L (ref 1–41)
ANION GAP SERPL CALCULATED.3IONS-SCNC: 8.6 MMOL/L (ref 5–15)
AST SERPL-CCNC: 11 U/L (ref 1–40)
BASOPHILS # BLD AUTO: 0.04 10*3/MM3 (ref 0–0.2)
BASOPHILS NFR BLD AUTO: 0.4 % (ref 0–1.5)
BILIRUB SERPL-MCNC: 0.4 MG/DL (ref 0–1.2)
BUN SERPL-MCNC: 14 MG/DL (ref 8–23)
BUN/CREAT SERPL: 16.5 (ref 7–25)
CALCIUM SPEC-SCNC: 9.5 MG/DL (ref 8.6–10.5)
CHLORIDE SERPL-SCNC: 100 MMOL/L (ref 98–107)
CO2 SERPL-SCNC: 29.4 MMOL/L (ref 22–29)
CREAT SERPL-MCNC: 0.85 MG/DL (ref 0.76–1.27)
DEPRECATED RDW RBC AUTO: 52.7 FL (ref 37–54)
DIGOXIN SERPL-MCNC: 0.58 NG/ML (ref 0.6–1.2)
EGFRCR SERPLBLD CKD-EPI 2021: 97.6 ML/MIN/1.73
EOSINOPHIL # BLD AUTO: 0.2 10*3/MM3 (ref 0–0.4)
EOSINOPHIL NFR BLD AUTO: 2.2 % (ref 0.3–6.2)
ERYTHROCYTE [DISTWIDTH] IN BLOOD BY AUTOMATED COUNT: 15.5 % (ref 12.3–15.4)
GLOBULIN UR ELPH-MCNC: 3.8 GM/DL
GLUCOSE BLDC GLUCOMTR-MCNC: 264 MG/DL (ref 70–99)
GLUCOSE SERPL-MCNC: 233 MG/DL (ref 65–99)
HCT VFR BLD AUTO: 41.6 % (ref 37.5–51)
HGB BLD-MCNC: 13.2 G/DL (ref 13–17.7)
HOLD SPECIMEN: NORMAL
HOLD SPECIMEN: NORMAL
IMM GRANULOCYTES # BLD AUTO: 0.03 10*3/MM3 (ref 0–0.05)
IMM GRANULOCYTES NFR BLD AUTO: 0.3 % (ref 0–0.5)
INR PPP: 1.08 (ref 0.86–1.15)
LYMPHOCYTES # BLD AUTO: 1.31 10*3/MM3 (ref 0.7–3.1)
LYMPHOCYTES NFR BLD AUTO: 14.6 % (ref 19.6–45.3)
MAGNESIUM SERPL-MCNC: 1.7 MG/DL (ref 1.6–2.4)
MCH RBC QN AUTO: 29.4 PG (ref 26.6–33)
MCHC RBC AUTO-ENTMCNC: 31.7 G/DL (ref 31.5–35.7)
MCV RBC AUTO: 92.7 FL (ref 79–97)
MONOCYTES # BLD AUTO: 0.55 10*3/MM3 (ref 0.1–0.9)
MONOCYTES NFR BLD AUTO: 6.1 % (ref 5–12)
NEUTROPHILS NFR BLD AUTO: 6.82 10*3/MM3 (ref 1.7–7)
NEUTROPHILS NFR BLD AUTO: 76.4 % (ref 42.7–76)
NRBC BLD AUTO-RTO: 0 /100 WBC (ref 0–0.2)
PLATELET # BLD AUTO: 268 10*3/MM3 (ref 140–450)
PMV BLD AUTO: 9.7 FL (ref 6–12)
POTASSIUM SERPL-SCNC: 3.9 MMOL/L (ref 3.5–5.2)
PROT SERPL-MCNC: 7.6 G/DL (ref 6–8.5)
PROTHROMBIN TIME: 14.1 SECONDS (ref 11.8–14.9)
RBC # BLD AUTO: 4.49 10*6/MM3 (ref 4.14–5.8)
SODIUM SERPL-SCNC: 138 MMOL/L (ref 136–145)
TSH SERPL DL<=0.05 MIU/L-ACNC: 2.39 UIU/ML (ref 0.27–4.2)
WBC NRBC COR # BLD: 8.95 10*3/MM3 (ref 3.4–10.8)
WHOLE BLOOD HOLD COAG: NORMAL
WHOLE BLOOD HOLD SPECIMEN: NORMAL

## 2023-10-29 PROCEDURE — 82948 REAGENT STRIP/BLOOD GLUCOSE: CPT

## 2023-10-29 PROCEDURE — 63710000001 INSULIN LISPRO (HUMAN) PER 5 UNITS: Performed by: INTERNAL MEDICINE

## 2023-10-29 PROCEDURE — 85610 PROTHROMBIN TIME: CPT

## 2023-10-29 PROCEDURE — 70551 MRI BRAIN STEM W/O DYE: CPT

## 2023-10-29 PROCEDURE — 80053 COMPREHEN METABOLIC PANEL: CPT

## 2023-10-29 PROCEDURE — 99204 OFFICE O/P NEW MOD 45 MIN: CPT | Performed by: PSYCHIATRY & NEUROLOGY

## 2023-10-29 PROCEDURE — 70450 CT HEAD/BRAIN W/O DYE: CPT

## 2023-10-29 PROCEDURE — 84443 ASSAY THYROID STIM HORMONE: CPT | Performed by: EMERGENCY MEDICINE

## 2023-10-29 PROCEDURE — G0378 HOSPITAL OBSERVATION PER HR: HCPCS

## 2023-10-29 PROCEDURE — 99284 EMERGENCY DEPT VISIT MOD MDM: CPT

## 2023-10-29 PROCEDURE — 85025 COMPLETE CBC W/AUTO DIFF WBC: CPT

## 2023-10-29 PROCEDURE — 82607 VITAMIN B-12: CPT | Performed by: INTERNAL MEDICINE

## 2023-10-29 PROCEDURE — 80162 ASSAY OF DIGOXIN TOTAL: CPT | Performed by: EMERGENCY MEDICINE

## 2023-10-29 PROCEDURE — 36415 COLL VENOUS BLD VENIPUNCTURE: CPT

## 2023-10-29 PROCEDURE — 83735 ASSAY OF MAGNESIUM: CPT | Performed by: EMERGENCY MEDICINE

## 2023-10-29 RX ORDER — POLYETHYLENE GLYCOL 3350 17 G/17G
17 POWDER, FOR SOLUTION ORAL DAILY PRN
Status: DISCONTINUED | OUTPATIENT
Start: 2023-10-29 | End: 2023-10-30 | Stop reason: HOSPADM

## 2023-10-29 RX ORDER — AMOXICILLIN 250 MG
2 CAPSULE ORAL 2 TIMES DAILY
Status: DISCONTINUED | OUTPATIENT
Start: 2023-10-29 | End: 2023-10-30 | Stop reason: HOSPADM

## 2023-10-29 RX ORDER — SPIRONOLACTONE 25 MG/1
25 TABLET ORAL DAILY
Status: DISCONTINUED | OUTPATIENT
Start: 2023-10-29 | End: 2023-10-30 | Stop reason: HOSPADM

## 2023-10-29 RX ORDER — NICOTINE POLACRILEX 4 MG
15 LOZENGE BUCCAL
Status: DISCONTINUED | OUTPATIENT
Start: 2023-10-29 | End: 2023-10-30 | Stop reason: HOSPADM

## 2023-10-29 RX ORDER — CHOLECALCIFEROL (VITAMIN D3) 125 MCG
5 CAPSULE ORAL NIGHTLY PRN
Status: DISCONTINUED | OUTPATIENT
Start: 2023-10-29 | End: 2023-10-30 | Stop reason: HOSPADM

## 2023-10-29 RX ORDER — ACETAMINOPHEN 325 MG/1
650 TABLET ORAL EVERY 4 HOURS PRN
Status: DISCONTINUED | OUTPATIENT
Start: 2023-10-29 | End: 2023-10-30 | Stop reason: HOSPADM

## 2023-10-29 RX ORDER — DEXTROSE MONOHYDRATE 25 G/50ML
25 INJECTION, SOLUTION INTRAVENOUS
Status: DISCONTINUED | OUTPATIENT
Start: 2023-10-29 | End: 2023-10-30 | Stop reason: HOSPADM

## 2023-10-29 RX ORDER — INSULIN LISPRO 100 [IU]/ML
4-24 INJECTION, SOLUTION INTRAVENOUS; SUBCUTANEOUS
Status: DISCONTINUED | OUTPATIENT
Start: 2023-10-29 | End: 2023-10-30 | Stop reason: HOSPADM

## 2023-10-29 RX ORDER — DIGOXIN 250 MCG
250 TABLET ORAL DAILY
Status: DISCONTINUED | OUTPATIENT
Start: 2023-10-29 | End: 2023-10-30 | Stop reason: HOSPADM

## 2023-10-29 RX ORDER — CALCIUM CARBONATE 500 MG/1
2 TABLET, CHEWABLE ORAL 2 TIMES DAILY PRN
Status: DISCONTINUED | OUTPATIENT
Start: 2023-10-29 | End: 2023-10-30 | Stop reason: HOSPADM

## 2023-10-29 RX ORDER — IBUPROFEN 600 MG/1
1 TABLET ORAL
Status: DISCONTINUED | OUTPATIENT
Start: 2023-10-29 | End: 2023-10-30 | Stop reason: HOSPADM

## 2023-10-29 RX ORDER — SODIUM CHLORIDE 0.9 % (FLUSH) 0.9 %
10 SYRINGE (ML) INJECTION EVERY 12 HOURS SCHEDULED
Status: DISCONTINUED | OUTPATIENT
Start: 2023-10-29 | End: 2023-10-30 | Stop reason: HOSPADM

## 2023-10-29 RX ORDER — BISACODYL 5 MG/1
5 TABLET, DELAYED RELEASE ORAL DAILY PRN
Status: DISCONTINUED | OUTPATIENT
Start: 2023-10-29 | End: 2023-10-30 | Stop reason: HOSPADM

## 2023-10-29 RX ORDER — GABAPENTIN 300 MG/1
600 CAPSULE ORAL EVERY 8 HOURS SCHEDULED
Status: DISCONTINUED | OUTPATIENT
Start: 2023-10-29 | End: 2023-10-30 | Stop reason: HOSPADM

## 2023-10-29 RX ORDER — ONDANSETRON 2 MG/ML
4 INJECTION INTRAMUSCULAR; INTRAVENOUS EVERY 4 HOURS PRN
Status: DISCONTINUED | OUTPATIENT
Start: 2023-10-29 | End: 2023-10-30 | Stop reason: HOSPADM

## 2023-10-29 RX ORDER — GABAPENTIN 300 MG/1
300 CAPSULE ORAL 3 TIMES DAILY
Status: DISCONTINUED | OUTPATIENT
Start: 2023-10-29 | End: 2023-10-30

## 2023-10-29 RX ORDER — ALBUTEROL SULFATE 90 UG/1
2 AEROSOL, METERED RESPIRATORY (INHALATION) EVERY 6 HOURS PRN
Status: DISCONTINUED | OUTPATIENT
Start: 2023-10-29 | End: 2023-10-30 | Stop reason: HOSPADM

## 2023-10-29 RX ORDER — ENOXAPARIN SODIUM 100 MG/ML
40 INJECTION SUBCUTANEOUS DAILY
Status: DISCONTINUED | OUTPATIENT
Start: 2023-10-29 | End: 2023-10-29

## 2023-10-29 RX ORDER — ALLOPURINOL 300 MG/1
300 TABLET ORAL DAILY
Status: DISCONTINUED | OUTPATIENT
Start: 2023-10-29 | End: 2023-10-30 | Stop reason: HOSPADM

## 2023-10-29 RX ORDER — SODIUM CHLORIDE 0.9 % (FLUSH) 0.9 %
10 SYRINGE (ML) INJECTION AS NEEDED
Status: DISCONTINUED | OUTPATIENT
Start: 2023-10-29 | End: 2023-10-30 | Stop reason: HOSPADM

## 2023-10-29 RX ORDER — SODIUM CHLORIDE 9 MG/ML
40 INJECTION, SOLUTION INTRAVENOUS AS NEEDED
Status: DISCONTINUED | OUTPATIENT
Start: 2023-10-29 | End: 2023-10-30 | Stop reason: HOSPADM

## 2023-10-29 RX ORDER — BISACODYL 10 MG
10 SUPPOSITORY, RECTAL RECTAL DAILY PRN
Status: DISCONTINUED | OUTPATIENT
Start: 2023-10-29 | End: 2023-10-30 | Stop reason: HOSPADM

## 2023-10-29 RX ORDER — FLUTICASONE PROPIONATE 50 MCG
1 SPRAY, SUSPENSION (ML) NASAL DAILY
Status: DISCONTINUED | OUTPATIENT
Start: 2023-10-29 | End: 2023-10-30 | Stop reason: HOSPADM

## 2023-10-29 RX ORDER — METOPROLOL SUCCINATE 50 MG/1
100 TABLET, EXTENDED RELEASE ORAL DAILY
Status: DISCONTINUED | OUTPATIENT
Start: 2023-10-29 | End: 2023-10-30

## 2023-10-29 RX ORDER — HYDROCODONE BITARTRATE AND ACETAMINOPHEN 5; 325 MG/1; MG/1
1 TABLET ORAL EVERY 12 HOURS PRN
Status: DISCONTINUED | OUTPATIENT
Start: 2023-10-29 | End: 2023-10-30 | Stop reason: HOSPADM

## 2023-10-29 RX ADMIN — Medication 10 ML: at 22:27

## 2023-10-29 RX ADMIN — GABAPENTIN 600 MG: 300 CAPSULE ORAL at 22:28

## 2023-10-29 RX ADMIN — GABAPENTIN 300 MG: 300 CAPSULE ORAL at 20:40

## 2023-10-29 RX ADMIN — INSULIN LISPRO 12 UNITS: 100 INJECTION, SOLUTION INTRAVENOUS; SUBCUTANEOUS at 22:27

## 2023-10-29 NOTE — ED PROVIDER NOTES
Time: 1:34 PM EDT  Date of encounter:  10/29/2023  Independent Historian/Clinical History and Information was obtained by:   Patient and wife  Chief Complaint: Double vision    History is limited by: N/A    History of Present Illness:  Patient is a 63 y.o. year old male who presents to the emergency department for evaluation of double vision.  Patient reports that Friday morning he states that he woke up and could not focus.  Patient said he felt kind of weak.  Patient reports in the afternoon he began having double vision.  Patient says double vision is present with both eyes open.  Patient describes the double vision is [id number] double vision.  Patient states if he closes either eye the double vision goes away.  Patient reports the double vision has been continuous since then.  Patient also reports having a mild sinus headache.  He states he feels weak in general.  Patient is that his balance is off today his wife checked his blood pressure found to be 166/114 and this prompted her to bring him to the ER.    HPI    Patient Care Team  Primary Care Provider: Shai Azar PA    Past Medical History:     Allergies   Allergen Reactions    Cucumber Extract Unknown - High Severity and Other (See Comments)    Influenza Virus Vaccine Unknown - High Severity    Sitagliptin Unknown - High Severity    Hyde Unknown - High Severity    Tomato Unknown - High Severity and Other (See Comments)     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     Past Surgical History:   Procedure Laterality Date    COLONOSCOPY      TONSILLECTOMY       Family History   Problem Relation Age of Onset    Cancer Father        Home Medications:  Prior to Admission medications    Medication Sig Start Date End  Date Taking? Authorizing Provider   albuterol sulfate  (90 Base) MCG/ACT inhaler Inhale 2 puffs Every 6 (Six) Hours As Needed. 12/13/22   Moiz Velasquez MD   allopurinol (ZYLOPRIM) 300 MG tablet Take 1 tablet by mouth Daily.    Moiz Velasquez MD   ascorbic acid (VITAMIN C) 500 MG capsule controlled-release CR capsule Take 1 capsule by mouth Daily.    Moiz Velasquez MD   Cetirizine HCl (ZyrTEC Allergy) 10 MG capsule Take 10 mg by mouth Daily.    Moiz Velasquez MD   Cinnamon 500 MG capsule Take 1 capsule by mouth 2 (two) times a day.    Moiz Velasquez MD   Cyanocobalamin (Vitamin B12) 3000 MCG/ML liquid Place  under the tongue Daily. Liquid    Moiz Velasquez MD   digoxin (LANOXIN) 250 MCG tablet Take 1 tablet by mouth Daily. 12/9/22   Moiz Velasquez MD   dilTIAZem CD (CARDIZEM CD) 240 MG 24 hr capsule Take 1 capsule by mouth Daily for 30 days. 7/11/23 8/10/23  Alexandre Cifuentes MD   fluticasone (FLONASE) 50 MCG/ACT nasal spray 1 spray into the nostril(s) as directed by provider Daily.    Moiz Velasquez MD   furosemide (Lasix) 40 MG tablet Take 1 tablet by mouth Daily for 30 days. 7/10/23 8/9/23  Alexandre Cifuentes MD   gabapentin (NEURONTIN) 300 MG capsule Take 1 capsule by mouth 3 (Three) Times a Day. 11/29/22   Moiz Velasquez MD   gabapentin (NEURONTIN) 600 MG tablet Take 1 tablet by mouth 3 (Three) Times a Day. 11/29/22   Moiz Velasquez MD   glimepiride (AMARYL) 4 MG tablet Take 1 tablet by mouth Every Morning Before Breakfast. 12/9/22   Moiz Velasquez MD   glucose blood test strip 1 each by Other route.    Moiz Velasquez MD   Homeopathic Products (Frankincense Uplifting) oil Apply  topically to the appropriate area as directed.    Moiz Velasquez MD   HYDROcodone-acetaminophen (NORCO) 5-325 MG per tablet Take 1 tablet by mouth Every 12 (Twelve) Hours As Needed. 11/29/22   Moiz Velasquez MD   Lancets (freestyle)  "lancets 1 each by Other route.    Moiz Velasquez MD   metFORMIN (GLUCOPHAGE) 500 MG tablet Take 2 tablets by mouth 2 (Two) Times a Day With Meals. Take 2 tablets twice a day    Moiz Velasquez MD   metoprolol succinate XL (TOPROL-XL) 100 MG 24 hr tablet Take 1 tablet by mouth Daily.    Moiz Velasquez MD   potassium chloride 10 MEQ CR tablet Take 1 tablet by mouth Daily. 10/16/22   Moiz Velasquez MD   Yeimi Oil oil     Moiz Velasquez MD   silver sulfadiazine (SILVADENE, SSD) 1 % cream Apply 1 application  topically to the appropriate area as directed Daily.    Moiz Velasquez MD   spironolactone (ALDACTONE) 25 MG tablet Take 1 tablet by mouth Daily.    Moiz Velasquez MD   Turmeric 400 MG capsule Take 400 mg by mouth Daily.    Moiz Velasquez MD        Social History:   Social History     Tobacco Use    Smoking status: Never    Smokeless tobacco: Never   Vaping Use    Vaping Use: Never used   Substance Use Topics    Alcohol use: Not Currently    Drug use: Never         Review of Systems:  Review of Systems   Constitutional:  Negative for chills and fever.   HENT:  Negative for congestion, ear pain and sore throat.    Eyes:  Positive for visual disturbance (Qlvu-yq-qbba double vision). Negative for pain.   Respiratory:  Negative for cough, chest tightness and shortness of breath.    Cardiovascular:  Negative for chest pain.   Gastrointestinal:  Negative for abdominal pain, diarrhea, nausea and vomiting.   Genitourinary:  Negative for flank pain and hematuria.   Musculoskeletal:  Negative for joint swelling.   Skin:  Negative for pallor.   Neurological:  Positive for weakness. Negative for seizures and headaches (\"Sinus\").   All other systems reviewed and are negative.       Physical Exam:  /70 (BP Location: Right arm, Patient Position: Lying)   Pulse 94   Temp 97.8 °F (36.6 °C) (Oral)   Resp 20   Ht 195.6 cm (77\")   Wt (!) 222 kg (490 lb 1.3 oz)   SpO2 " 92%   BMI 58.12 kg/m²     Physical Exam    Vital signs were reviewed under triage note.  General appearance - Patient appears well-developed and well-nourished.  Patient is in no acute distress.  Morbidly obese.  Head - Normocephalic, atraumatic.  Pupils - Equal, round, reactive to light. Conjunctiva is clear.  Nasal - Normal inspection.  No evidence of trauma or epistaxis.  Tympanic membranes - Gray, intact without erythema or retractions.  Oral mucosa - Pink and moist without lesions or erythema.  Uvula is midline.  Chest wall - Atraumatic.  Chest wall is nontender.  There are no vesicular rashes noted.  Neck - Supple.  Trachea was midline.  There is no palpable lymphadenopathy or thyromegaly.  There are no meningeal signs  Lungs - Clear to auscultation and percussion bilaterally.  Heart -  Irregular rate and rhythm without any murmurs, clicks, or gallops.  Abdomen - Soft.  Bowel sounds are present.  There is no palpable tenderness.  There is no rebound, guarding, or rigidity.  There are no palpable masses.  There are no pulsatile masses.  Back - Spine is straight and midline.  There is no CVA tenderness.  Extremities - Intact x4 with full range of motion.  There is no palpable edema.  Pulses are intact x4 and equal.  Neurologic - Patient is awake, alert, and oriented x3.  Cranial nerves II through XII are grossly intact with the exception of possibly the right side 6 cranial nerve.  Patient has difficulty tracking his right eye laterally.  Patient seems to have a lateral nystagmus when he forces his eye to the far right side.  Motor and sensory functions grossly intact.  Cerebellar function was normal.  Integument - There are no rashes.  There are no petechia or purpura lesions noted.  There are no vesicular lesions noted.           Procedures:  Procedures      Medical Decision Making:      Comorbidities that affect care:    Diabetes gout, atrial fibrillation, diabetic neuropathy, congestive heart failure,  coronary artery disease    External Notes reviewed:    Hospital Discharge Summary: Discharge summary dated 7/10/2023 was reviewed by me.      The following orders were placed and all results were independently analyzed by me:  Orders Placed This Encounter   Procedures    Respiratory Panel PCR w/COVID-19(SARS-CoV-2) JAN/BEATRIZ/MICHAEL/PAD/COR/MAD/YARON In-House, NP Swab in UTM/VTM, 3-4 HR TAT - Swab, Nasopharynx    CT Head Without Contrast    MRI Brain Without Contrast    Comprehensive Metabolic Panel    Protime-INR    CBC Auto Differential    [place] Draw    TSH    Magnesium    Digoxin Level    Basic Metabolic Panel    CBC Auto Differential    Magnesium    Lipid Panel    Lyme Disease Total Antibody With Reflex to Immunoassay    Vitamin B12    Acetylcholine Receptor Antibody Panel    Basic Metabolic Panel    Magnesium    Phosphorus    Myasthenia Gravis Profile    Diet: Diabetic Diets; Consistent Carbohydrate; Texture: Regular Texture (IDDSI 7); Fluid Consistency: Thin (IDDSI 0)    Vital Signs    Notify Provider (With Default Parameters)    Intake & Output    Weigh Patient    Oral Care    Saline Lock & Maintain IV Access    Up With Assistance    Up in Chair    Ambulate Patient    Neuro Checks    Strict Intake & Output    Insert Indwelling Urinary Catheter    Assess Need for Indwelling Urinary Catheter - Follow Removal Protocol    Urinary Catheter Care    Code Status and Medical Interventions:    Inpatient Neurology Consult General    Hospitalist (on-call MD unless specified)    Inpatient Neurology Consult General    Inpatient Case Management  Consult    OT Consult: Eval & Treat    PT Consult: Eval & Treat Discharge Placement Assessment    POC Glucose 4x Daily Before Meals & at Bedtime    POC Glucose Once    POC Glucose Once    Wound Ostomy Eval & Treat    Insert Peripheral IV    Initiate Observation Status    Fall Precautions    CBC & Differential    Green Top (Gel)    Lavender Top    Gold Top - SST    Light  Blue Top    CBC & Differential       Medications Given in the Emergency Department:  Medications   sodium chloride 0.9 % flush 10 mL (10 mL Intravenous Given 10/30/23 0833)   sodium chloride 0.9 % flush 10 mL (has no administration in time range)   sodium chloride 0.9 % infusion 40 mL (has no administration in time range)   acetaminophen (TYLENOL) tablet 650 mg (has no administration in time range)   calcium carbonate (TUMS) chewable tablet 500 mg (200 mg elemental) (has no administration in time range)   sennosides-docusate (PERICOLACE) 8.6-50 MG per tablet 2 tablet (2 tablets Oral Not Given 10/30/23 0828)     And   polyethylene glycol (MIRALAX) packet 17 g (has no administration in time range)     And   bisacodyl (DULCOLAX) EC tablet 5 mg (has no administration in time range)     And   bisacodyl (DULCOLAX) suppository 10 mg (has no administration in time range)   ondansetron (ZOFRAN) injection 4 mg (has no administration in time range)   melatonin tablet 5 mg (has no administration in time range)   dextrose (GLUTOSE) oral gel 15 g (has no administration in time range)   dextrose (D50W) (25 g/50 mL) IV injection 25 g (has no administration in time range)   glucagon (GLUCAGEN) injection 1 mg (has no administration in time range)   Insulin Lispro (humaLOG) injection 4-24 Units (8 Units Subcutaneous Given 10/30/23 0831)   albuterol sulfate HFA (PROVENTIL HFA;VENTOLIN HFA;PROAIR HFA) inhaler 2 puff (has no administration in time range)   allopurinol (ZYLOPRIM) tablet 300 mg (300 mg Oral Given 10/30/23 0833)   digoxin (LANOXIN) tablet 250 mcg (250 mcg Oral Given 10/30/23 0832)   fluticasone (FLONASE) 50 MCG/ACT nasal spray 1 spray (1 spray Nasal Given 10/30/23 0832)   gabapentin (NEURONTIN) capsule 300 mg (300 mg Oral Not Given 10/30/23 0830)   gabapentin (NEURONTIN) capsule 600 mg (600 mg Oral Given 10/30/23 0647)   HYDROcodone-acetaminophen (NORCO) 5-325 MG per tablet 1 tablet (has no administration in time range)    spironolactone (ALDACTONE) tablet 25 mg (25 mg Oral Given 10/30/23 0830)   apixaban (ELIQUIS) tablet 5 mg (5 mg Oral Given 10/30/23 0831)   metoprolol succinate XL (TOPROL-XL) 24 hr tablet 50 mg (50 mg Oral Given 10/30/23 0829)   pyridostigmine (MESTINON) tablet 30 mg (has no administration in time range)        ED Course:     The patient was seen and evaluated the ED by me.  The above history and physical examination was performed as documented.  Diagnostic data was obtained.  Results reviewed.  Discussed with the patient and wife.  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  MRI was ordered which came back negative for acute CVA.  Teleneurology was consulted.  They feel this is most likely secondary to neuropathy since the MRI is negative.  The patient however is very unsteady on his feet and off balance.  Patient does not feel safe being discharged home without any home medical supply equipment.  I spoke with the hospitalist service for admission to get a PT/ OT evaluation as well as social work to get some home medical supply.    Labs:    Lab Results (last 24 hours)       Procedure Component Value Units Date/Time    CBC & Differential [[id number]]  (Abnormal) Collected: 10/29/23 1137    Specimen: Blood Updated: 10/29/23 1143    Narrative:      The following orders were created for panel order CBC & Differential.  Procedure                               Abnormality         Status                     ---------                               -----------         ------                     CBC Auto Differential[[id number]]        Abnormal            Final result                 Please view results for these tests on the individual orders.    Comprehensive Metabolic Panel [[id number]]  (Abnormal) Collected: 10/29/23 1137    Specimen: Blood Updated: 10/29/23 1216     Glucose 233 mg/dL      BUN 14 mg/dL      Creatinine 0.85 mg/dL      Sodium 138 mmol/L      Potassium 3.9 mmol/L      Chloride 100 mmol/L      CO2 29.4  mmol/L      Calcium 9.5 mg/dL      Total Protein 7.6 g/dL      Albumin 3.8 g/dL      ALT (SGPT) 12 U/L      AST (SGOT) 11 U/L      Alkaline Phosphatase 54 U/L      Total Bilirubin 0.4 mg/dL      Globulin 3.8 gm/dL      A/G Ratio 1.0 g/dL      BUN/Creatinine Ratio 16.5     Anion Gap 8.6 mmol/L      eGFR 97.6 mL/min/1.73     Narrative:      GFR Normal >60  Chronic Kidney Disease <60  Kidney Failure <15      Protime-INR [[id number]]  (Normal) Collected: 10/29/23 1137    Specimen: Blood Updated: 10/29/23 1152     Protime 14.1 Seconds      INR 1.08    Narrative:      Suggested Therapeutic Ranges For Oral Anticoagulant Therapy:  Level of Therapy                      INR Target Range  Standard Dose                            2.0-3.0  High Dose                                2.5-3.5  Patients not receiving anticoagulant  Therapy Normal Range                     0.86-1.15    CBC Auto Differential [[id number]]  (Abnormal) Collected: 10/29/23 1137    Specimen: Blood Updated: 10/29/23 1143     WBC 8.95 10*3/mm3      RBC 4.49 10*6/mm3      Hemoglobin 13.2 g/dL      Hematocrit 41.6 %      MCV 92.7 fL      MCH 29.4 pg      MCHC 31.7 g/dL      RDW 15.5 %      RDW-SD 52.7 fl      MPV 9.7 fL      Platelets 268 10*3/mm3      Neutrophil % 76.4 %      Lymphocyte % 14.6 %      Monocyte % 6.1 %      Eosinophil % 2.2 %      Basophil % 0.4 %      Immature Grans % 0.3 %      Neutrophils, Absolute 6.82 10*3/mm3      Lymphocytes, Absolute 1.31 10*3/mm3      Monocytes, Absolute 0.55 10*3/mm3      Eosinophils, Absolute 0.20 10*3/mm3      Basophils, Absolute 0.04 10*3/mm3      Immature Grans, Absolute 0.03 10*3/mm3      nRBC 0.0 /100 WBC     TSH [[id number]]  (Normal) Collected: 10/29/23 1137    Specimen: Blood Updated: 10/29/23 1346     TSH 2.390 uIU/mL     Magnesium [[id number]]  (Normal) Collected: 10/29/23 1137    Specimen: Blood Updated: 10/29/23 1340     Magnesium 1.7 mg/dL     Digoxin Level [[id number]]  (Abnormal) Collected: 10/29/23 1132     Specimen: Blood Updated: 10/29/23 1340     Digoxin 0.58 ng/mL     Vitamin B12 [[id number]] Collected: 10/29/23 1137    Specimen: Blood Updated: 10/29/23 2154    POC Glucose Once [[id number]]  (Abnormal) Collected: 10/29/23 2200    Specimen: Blood Updated: 10/29/23 2201     Glucose 264 mg/dL      Comment: Serial Number: [id number]:  [id number]       Respiratory Panel PCR w/COVID-19(SARS-CoV-2) JAN/BEATRIZ/MICHAEL/PAD/COR/MAD/YARON In-House, NP Swab in UTM/VTM, 3-4 HR TAT - Swab, Nasopharynx [[id number]]  (Normal) Collected: 10/30/23 0153    Specimen: Swab from Nasopharynx Updated: 10/30/23 0246     ADENOVIRUS, PCR Not Detected     Coronavirus 229E Not Detected     Coronavirus HKU1 Not Detected     Coronavirus NL63 Not Detected     Coronavirus OC43 Not Detected     COVID19 Not Detected     Human Metapneumovirus Not Detected     Human Rhinovirus/Enterovirus Not Detected     Influenza A PCR Not Detected     Influenza B PCR Not Detected     Parainfluenza Virus 1 Not Detected     Parainfluenza Virus 2 Not Detected     Parainfluenza Virus 3 Not Detected     Parainfluenza Virus 4 Not Detected     RSV, PCR Not Detected     Bordetella pertussis pcr Not Detected     Bordetella parapertussis PCR Not Detected     Chlamydophila pneumoniae PCR Not Detected     Mycoplasma pneumo by PCR Not Detected    Narrative:      In the setting of a positive respiratory panel with a viral infection PLUS a negative procalcitonin without other underlying concern for bacterial infection, consider observing off antibiotics or discontinuation of antibiotics and continue supportive care. If the respiratory panel is positive for atypical bacterial infection (Bordetella pertussis, Chlamydophila pneumoniae, or Mycoplasma pneumoniae), consider antibiotic de-escalation to target atypical bacterial infection.    Lipid Panel [[id number]]  (Abnormal) Collected: 10/30/23 0512    Specimen: Blood from Hand, Left Updated: 10/30/23 0639     Total Cholesterol 140 mg/dL       Triglycerides 110 mg/dL      HDL Cholesterol 27 mg/dL      LDL Cholesterol  93 mg/dL      VLDL Cholesterol 20 mg/dL      LDL/HDL Ratio 3.37    Narrative:      Cholesterol Reference Ranges  (U.S. Department of Health and Human Services ATP III Classifications)    Desirable          <200 mg/dL  Borderline High    200-239 mg/dL  High Risk          >240 mg/dL      Triglyceride Reference Ranges  (U.S. Department of Health and Human Services ATP III Classifications)    Normal           <150 mg/dL  Borderline High  150-199 mg/dL  High             200-499 mg/dL  Very High        >500 mg/dL    HDL Reference Ranges  (U.S. Department of Health and Human Services ATP III Classifications)    Low     <40 mg/dl (major risk factor for CHD)  High    >60 mg/dl ('negative' risk factor for CHD)        LDL Reference Ranges  (U.S. Department of Health and Human Services ATP III Classifications)    Optimal          <100 mg/dL  Near Optimal     100-129 mg/dL  Borderline High  130-159 mg/dL  High             160-189 mg/dL  Very High        >189 mg/dL    Lyme Disease Total Antibody With Reflex to Immunoassay [[id number]] Collected: 10/30/23 0512    Specimen: Blood from Hand, Left Updated: 10/30/23 0558    Acetylcholine Receptor Antibody Panel [[id number]] Collected: 10/30/23 0512    Specimen: Blood from Hand, Left Updated: 10/30/23 0559    Basic Metabolic Panel [[id number]]  (Abnormal) Collected: 10/30/23 0512    Specimen: Blood from Hand, Left Updated: 10/30/23 0639     Glucose 251 mg/dL      BUN 11 mg/dL      Creatinine 0.68 mg/dL      Sodium 138 mmol/L      Potassium 3.7 mmol/L      Chloride 101 mmol/L      CO2 28.0 mmol/L      Calcium 9.3 mg/dL      BUN/Creatinine Ratio 16.2     Anion Gap 9.0 mmol/L      eGFR 104.4 mL/min/1.73     Narrative:      GFR Normal >60  Chronic Kidney Disease <60  Kidney Failure <15      CBC Auto Differential [[id number]]  (Abnormal) Collected: 10/30/23 0512    Specimen: Blood from Hand, Left Updated: 10/30/23  0609     WBC 9.23 10*3/mm3      RBC 4.41 10*6/mm3      Hemoglobin 13.0 g/dL      Hematocrit 40.5 %      MCV 91.8 fL      MCH 29.5 pg      MCHC 32.1 g/dL      RDW 15.3 %      RDW-SD 51.6 fl      MPV 10.2 fL      Platelets 297 10*3/mm3      Neutrophil % 80.7 %      Lymphocyte % 11.4 %      Monocyte % 5.7 %      Eosinophil % 1.6 %      Basophil % 0.3 %      Immature Grans % 0.3 %      Neutrophils, Absolute 7.44 10*3/mm3      Lymphocytes, Absolute 1.05 10*3/mm3      Monocytes, Absolute 0.53 10*3/mm3      Eosinophils, Absolute 0.15 10*3/mm3      Basophils, Absolute 0.03 10*3/mm3      Immature Grans, Absolute 0.03 10*3/mm3      nRBC 0.0 /100 WBC     Magnesium [[id number]]  (Normal) Collected: 10/30/23 0512    Specimen: Blood from Hand, Left Updated: 10/30/23 0639     Magnesium 1.9 mg/dL     POC Glucose Once [[id number]]  (Abnormal) Collected: 10/30/23 0801    Specimen: Blood Updated: 10/30/23 0806     Glucose 231 mg/dL      Comment: Serial Number: [id number]:  [id number]                Imaging:    MRI Brain Without Contrast    Result Date: 10/29/2023  PROCEDURE: MRI BRAIN WO CONTRAST  COMPARISON: None  INDICATIONS: dizziness, unsteady gait, visual disturbance      TECHNIQUE: A variety of imaging planes and parameters were utilized for visualization of suspected pathology.  Images were performed without contrast.  FINDINGS:  No focal abnormal diffusion restriction is observed to indicate acute focal ischemia. Mild nonspecific periventricular, deep white matter, and subcortical white matter signal changes are seen bilaterally likely related to chronic small vessel ischemic changes or age-related changes. Mild associated diffuse volume loss is observed. These findings are age-appropriate. There is no evidence for abnormal focal enhancement or abnormal enhancing mass. No abnormal cerebral edema is seen. No mass effect or midline shift is observed. The midline structures are unremarkable. The basilar cisterns remain  patent. No significant extra-axial fluid collections are identified. The expected signal voids of the intracranial vessels are noted.  No significant abnormal signal is observed corresponding to the paranasal sinuses or mastoid air cells.        1. No evidence for acute focal ischemia. 2. No evidence for abnormal focal enhancement or abnormal enhancing mass. 3. Nonspecific white matter signal changes seen bilaterally likely related to chronic small vessel ischemic changes or age-related changes. Associated diffuse volume loss is observed.        MOE MURPHY MD       Electronically Signed and Approved By: MOE MURPHY MD on 10/29/2023 at 16:12             CT Head Without Contrast    Result Date: 10/29/2023  PROCEDURE: CT HEAD WO CONTRAST  COMPARISON:  [facility], CT, HEAD W/O CONTRAST, 11/29/2015, 4:38. INDICATIONS: Blurred vision, headache  PROTOCOL:   Standard imaging protocol performed    RADIATION:   DLP: 927 mGy*cm   MA and/or KV was adjusted to minimize radiation dose.     TECHNIQUE: After obtaining the patient's consent, CT images were obtained without non-ionic intravenous contrast material.  FINDINGS:  There is no evidence for acute intracranial hemorrhage. No definitive acute focal ischemia is observed. There is no evidence for abnormal cerebral edema. No mass effect or midline shift is seen. The ventricular system is nondilated. The basilar cisterns are patent. The skull is intact without displaced fracture. The paranasal sinuses and mastoid air cells are clear.        1. No evidence for acute intracranial abnormality.     MOE MURPHY MD       Electronically Signed and Approved By: MOE MURPHY MD on 10/29/2023 at 12:13                Differential Diagnosis and Discussion:    Eye Pain/Blurred Vision: Differential diagnosis includes but is not limited to dacryocystitis, hordeolum, chalazion, periorbital cellulitis, cavernous sinus thrombosis, blepharitis, and glaucoma.    All labs  were reviewed and interpreted by me.  CT scan radiology impression was interpreted by me.  MRI impression was interpreted by me.     MDM     Amount and/or Complexity of Data Reviewed  Clinical lab tests: reviewed  Tests in the radiology section of CPT®: reviewed             Patient Care Considerations:    ANTIBIOTICS: I considered prescribing antibiotics as an outpatient however the material infection identified.      Consultants/Shared Management Plan:    Hospitalist: I have discussed the case with Dr. Sepulveda who agrees to accept the patient for admission.    Social Determinants of Health:    Patient is independent, reliable, and has access to care.       Disposition and Care Coordination:    Admit:   Through independent evaluation of the patient's history, physical, and imperical data, the patient meets criteria for observation/admission to the hospital.        Final diagnoses:   Right abducens nerve palsy   Unstable gait        ED Disposition       ED Disposition   Decision to Admit    Condition   --    Comment   Level of Care: Telemetry [5]   Diagnosis: 6th nerve palsy [216870]   Admitting Physician: ANGELICA SEPULVEDA [[id number]]   Attending Physician: ANGELICA SEPULVEDA [[id number]]                 This medical record created using voice recognition software.             Jose Antonio Graves DO  10/30/23 1029

## 2023-10-29 NOTE — CONSULTS
Inpatient Neurology Consult General  Consult performed by: Darcy Barahona MD  Consult ordered by: Jose Antonio Graves DO  Reason for consult: Neurology STAT Consult: diplopia, unsteady gait        [facility]  [facility] Tele[facility]urology Consult Services    Stat Consult    Patient Name:   Niall Mak  YOB: 1960  Identification Number:   MRN - [id number]  Date of Service:   10/29/2023 13:42:04    Diagnosis:        H53.2 - Diplopia    Impression  64y/o man w/ vascular risk factors presenting for evaluation of persistent horizontal diplopia since Friday, LKW>24hrs prior to my evaluation, NIHSS:2 w/ Lt CN VI palsy and mild Lt NLF, head CT w/ no acute findings, pending MRI brain w/o jamari, if acute stroke needs stroke work up, if no acute findings likely cranial nerve palsy related to DM, recommend eye patch, alternating eyes every day.      Recommendations:  Our recommendations are outlined below.    Diagnostic Studies :  MRI head without contrast  Study/results pending  if acute stroke needs stroke work up: MRA head and neck, 2Decho w/ bubble study    Laboratory Studies :  Lipid panel  Please order  Hemoglobin A1c  Please order  TSH  Please order  B12 levels, Lyme Ab    Antithrombotic Medication :  Normotension is recommended c/w home meds, including eliquis    Nursing Recommendations :  IV Fluids, avoid dextrose containing fluids, Maintain euglycemiaNeuro checks q4 hrs x 24 hrs and then per shiftHead of bed 30 degreesContinue with Telemetry    Consultations :  Recommend Speech therapy if failed dysphagia screenPhysical therapy/Occupational therapy    DVT Prophylaxis :  Choice of Primary Team    Disposition :  Neurology will follow      ----------------------------------------------------------------------------------------------------      Advanced Imaging:  Advanced Imaging Deferred because:    Does not meet criteria due to being out of the 24-hour window for  thrombectomy        Metrics:  [facility] Notification Time: 10/29/2023 13:40:43  Stamp Time: 10/29/2023 13:42:04  Callback Response Time: 10/29/2023 13:43:09    Primary Provider Notified of Diagnostic Impression and Management Plan on: 10/29/2023 14:44:58      CT HEAD:  As Per Radiologist CT Head Showed No Acute Hemorrhage or Acute Core Infarct        ----------------------------------------------------------------------------------------------------    Chief Complaint:  diplopia    History of Present Illness:  62y/o man w/ CHF, a-fib on eliquis, DM presenting for evaluation of persistent horizontal diplopia noted since woke up last Friday, denies dizziness or headaches, but resports unsteady gait, has extensive lymphedema in legs and abdomen, but reports ambulating w/ no asisstance at baseline, denies similar episodes in the past.      Past Medical History:       Diabetes Mellitus       Atrial Fibrillation       Coronary Artery Disease       There is no history of Stroke    Medications:    Anticoagulant use:  Yes eliquis  No Antiplatelet use  Reviewed EMR for current medications    Allergies:   Reviewed    Social History:  Smoking: No  Alcohol Use: No  Drug Use: No    Family History:    There is no family history of premature cerebrovascular disease pertinent to this consultation    ROS :  14 Points Review of Systems was performed and was negative except mentioned in HPI.    Past Surgical History:  There Is No Surgical History Contributory To Today’s Visit      Examination:  BP(126/88), Pulse(95), Blood Glucose(233)  1A: Level of Consciousness - Alert; keenly responsive + 0  1B: Ask Month and Age - Both Questions Right + 0  1C: Blink Eyes & Squeeze Hands - Performs Both Tasks + 0  2: Test Horizontal Extraocular Movements - Normal + 0  3: Test Visual Fields - Partial Hemianopia + 1  4: Test Facial Palsy (Use Grimace if Obtunded) - Minor paralysis (flat nasolabial fold, smile asymmetry) + 1  5A: Test Left Arm  Motor Drift - No Drift for 10 Seconds + 0  5B: Test Right Arm Motor Drift - No Drift for 10 Seconds + 0  6A: Test Left Leg Motor Drift - No Drift for 5 Seconds + 0  6B: Test Right Leg Motor Drift - No Drift for 5 Seconds + 0  7: Test Limb Ataxia (FNF/Heel-Shin) - No Ataxia + 0  8: Test Sensation - Normal; No sensory loss + 0  9: Test Language/Aphasia - Normal; No aphasia + 0  10: Test Dysarthria - Normal + 0  11: Test Extinction/Inattention - No abnormality + 0    NIHSS Score: 2  NIHSS Free Text : Lt CN VI palsy    Spoke with : Dr. Graves        Patient / Family was informed the Neurology Consult would occur via TeleHealth consult by way of interactive audio and video telecommunications and consented to receiving care in this manner.    Patient is being evaluated for possible acute neurologic impairment and high probability of imminent or life - threatening deterioration.I spent total of 35 minutes providing care to this patient, including time for face to face visit via telemedicine, review of medical records, imaging studies and discussion of findings with providers, the patient and / or family.      Dr Maddy Prieto      [facility]  For Inpatient follow-up with [facility] physician please call [facility] [phone]. This is not an outpatient service. Post hospital discharge, please contact hospital directly.

## 2023-10-29 NOTE — H&P
[facility]  Date: 10/29/2023   Patient Name: Niall Mak  : 1960  MRN: [id number]  Primary Care Physician:  Shai Azar PA  Date of admission: 10/29/2023    Subjective   Subjective     Chief Complaint: Double vision    HPI:    Niall Mak is a 63 y.o. male PMH morbid obesity, CHF, type 2 diabetes mellitus, hypertension, A-fib on Eliquis, who presented to the ED on 10/29 due to complaints of persistent horizontal diplopia.  He states his symptoms started this past Friday and has been persistent.  He denies dizziness or headaches but does endorse pain/pressure behind his eyes.  About a 3/10.  He has been using an eye patch and closing 1 eye at a time to help with the symptoms.  Has had difficulty with ambulation and unsteadiness.  He does report a recent viral infection, he has had diarrhea, feeling achy, fatigue, myalgias.  No upper respiratory symptoms.  MRI was obtained in the ED which was negative for acute CVA.  However, the patient was unable to ambulate safely in the ED, his wife is at bedside and states they are unable to get him back in the home safely.  He is admitted for further care.      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       Past Surgical History:  Past Surgical History:   Procedure Laterality Date    COLONOSCOPY      TONSILLECTOMY         Family History:   Family History   Problem Relation Age of Onset    Cancer Father         Social History:   Social History     Socioeconomic History    Marital status:    Tobacco Use    Smoking status: Never    Smokeless tobacco: Never   Vaping Use    Vaping Use: Never used   Substance and Sexual Activity    Alcohol  use: Not Currently    Drug use: Never    Sexual activity: Not Currently     Partners: Female        Home Medications:  Cetirizine HCl, Cinnamon, Frankincense Uplifting, HYDROcodone-acetaminophen, Rosemary Oil, Turmeric, Vitamin B12, albuterol sulfate HFA, allopurinol, ascorbic acid, digoxin, dilTIAZem CD, fluticasone, freestyle, furosemide, gabapentin, glimepiride, glucose blood, metFORMIN, metoprolol succinate XL, potassium chloride, silver sulfadiazine, and spironolactone    Allergies:  Allergies   Allergen Reactions    Cucumber Extract Unknown - High Severity and Other (See Comments)    Influenza Virus Vaccine Unknown - High Severity    Sitagliptin Unknown - High Severity    Floydada Unknown - High Severity    Tomato Unknown - High Severity and Other (See Comments)       Review of Systems   A 14 point review of systems was obtained and otherwise negative unless stated in the HPI    Objective   Objective     Vitals:   Temp:  [97.8 °F (36.6 °C)] 97.8 °F (36.6 °C)  Heart Rate:  [] 95  Resp:  [18] 18  BP: (126-159)/(74-92) 126/88    Physical Exam    Constitutional: Obese male, malodorous, awake, alert, no acute distress   HENT: NCAT, mucous membranes moist, keeps 1 eye closed at all times   Respiratory: Clear to auscultation bilaterally, nonlabored respirations    Cardiovascular: RRR, no MRG   Gastrointestinal: Positive bowel sounds, soft, nontender, nondistended   Musculoskeletal: No bilateral ankle edema, no clubbing or cyanosis to extremities   Psychiatric: Appropriate affect, cooperative   Neurologic: Oriented x 3, strength symmetric in all extremities, Cranial Nerves grossly intact to confrontation, speech clear   Skin: Significant lymphedema noted, dry crusting skin of abdomen    Result Review    Result Review:  I have personally reviewed the results from the time of this admission to 10/29/2023 17:30 EDT and agree with these findings:  [x]  Laboratory personally reviewed CMP, CBC, INR, TSH  CBC           7/8/2023    05:43 9/28/2023    14:36 10/29/2023    11:37   CBC   WBC 8.97  8.24     8.95    RBC 3.92  4.48     4.49    Hemoglobin 12.1  13.4     13.2    Hematocrit 37.0  41.4     41.6    MCV 94.4  92.4     92.7    MCH 30.9  29.9     29.4    MCHC 32.7  32.4     31.7    RDW 15.3  15.9     15.5    Platelets 193  289     268       Details          This result is from an external source.             CMP          7/9/2023    04:31 7/9/2023    20:21 7/10/2023    05:13 10/29/2023    11:37   CMP   Glucose 262  300  266  233    BUN 16   18  14    Creatinine 0.91   0.91  0.85    EGFR 94.7   94.7  97.6    Sodium 133   133  138    Potassium 4.0   4.0  3.9    Chloride 98   96  100    Calcium 9.4   9.3  9.5    Total Protein    7.6    Albumin 3.4   3.3  3.8    Globulin    3.8    Total Bilirubin    0.4    Alkaline Phosphatase    54    AST (SGOT)    11    ALT (SGPT)    12    Albumin/Globulin Ratio    1.0    BUN/Creatinine Ratio 17.6   19.8  16.5    Anion Gap 11.6   14.0  8.6      []  Microbiology  [x]  Radiology MRI personally reviewed with evidence of acute CVA  [x]  EKG/Telemetry Telemetry personally reviewed  []  Cardiology/Vascular   []  Pathology  []  Old records  []  Other:      Assessment & Plan   Assessment / Plan     Assessment/Plan:   Horizontal diplopia, concern for cranial nerve VI palsy likely due to neuropathy versus viral inflammation  Type 2 diabetes mellitus A1c 8.8  Paroxysmal atrial fibrillation on Eliquis  Hypertension  Chronic diastolic congestive heart failure, not currently in acute exacerbation  Super morbid obesity    Admit to the hospital on telemetry for work-up and management of the above  Neurology consulted, appreciate assistance  Obtain MRA head and neck  Obtain Lyme antibody, acetylcholine receptor antibody panel, B12, lipid panel  Recommend wearing eye patch daily, alternating eyes every day  Continue home metoprolol, gabapentin, digoxin, Eliquis, allopurinol  Consult PT/OT/case management  Trend  renal function and electrolytes with a.m. BMP, magnesium   Trend Hgb and WBC with a.m. CBC    Discussed case with: Bedside RN, ED physician, neurology    DVT prophylaxis:  Medical DVT prophylaxis orders are present.    CODE STATUS:    Level Of Support Discussed With: Patient  Code Status (Patient has no pulse and is not breathing): CPR (Attempt to Resuscitate)  Medical Interventions (Patient has pulse or is breathing): Full Support      Electronically signed by Vinicio Christy MD, 10/29/23, 5:30 PM EDT.

## 2023-10-30 ENCOUNTER — TELEPHONE (OUTPATIENT)
Dept: NEUROLOGY | Facility: CLINIC | Age: 63
End: 2023-10-30
Payer: MEDICARE

## 2023-10-30 ENCOUNTER — READMISSION MANAGEMENT (OUTPATIENT)
Dept: CALL CENTER | Facility: HOSPITAL | Age: 63
End: 2023-10-30
Payer: MEDICARE

## 2023-10-30 VITALS
OXYGEN SATURATION: 95 % | RESPIRATION RATE: 20 BRPM | SYSTOLIC BLOOD PRESSURE: 145 MMHG | TEMPERATURE: 97.9 F | DIASTOLIC BLOOD PRESSURE: 68 MMHG | WEIGHT: 315 LBS | HEIGHT: 77 IN | BODY MASS INDEX: 37.19 KG/M2 | HEART RATE: 86 BPM

## 2023-10-30 LAB
ANION GAP SERPL CALCULATED.3IONS-SCNC: 9 MMOL/L (ref 5–15)
B PARAPERT DNA SPEC QL NAA+PROBE: NOT DETECTED
B PERT DNA SPEC QL NAA+PROBE: NOT DETECTED
BASOPHILS # BLD AUTO: 0.03 10*3/MM3 (ref 0–0.2)
BASOPHILS NFR BLD AUTO: 0.3 % (ref 0–1.5)
BUN SERPL-MCNC: 11 MG/DL (ref 8–23)
BUN/CREAT SERPL: 16.2 (ref 7–25)
C PNEUM DNA NPH QL NAA+NON-PROBE: NOT DETECTED
CALCIUM SPEC-SCNC: 9.3 MG/DL (ref 8.6–10.5)
CHLORIDE SERPL-SCNC: 101 MMOL/L (ref 98–107)
CHOLEST SERPL-MCNC: 140 MG/DL (ref 0–200)
CO2 SERPL-SCNC: 28 MMOL/L (ref 22–29)
CREAT SERPL-MCNC: 0.68 MG/DL (ref 0.76–1.27)
DEPRECATED RDW RBC AUTO: 51.6 FL (ref 37–54)
EGFRCR SERPLBLD CKD-EPI 2021: 104.4 ML/MIN/1.73
EOSINOPHIL # BLD AUTO: 0.15 10*3/MM3 (ref 0–0.4)
EOSINOPHIL NFR BLD AUTO: 1.6 % (ref 0.3–6.2)
ERYTHROCYTE [DISTWIDTH] IN BLOOD BY AUTOMATED COUNT: 15.3 % (ref 12.3–15.4)
FLUAV SUBTYP SPEC NAA+PROBE: NOT DETECTED
FLUBV RNA ISLT QL NAA+PROBE: NOT DETECTED
GLUCOSE BLDC GLUCOMTR-MCNC: 231 MG/DL (ref 70–99)
GLUCOSE BLDC GLUCOMTR-MCNC: 233 MG/DL (ref 70–99)
GLUCOSE SERPL-MCNC: 251 MG/DL (ref 65–99)
HADV DNA SPEC NAA+PROBE: NOT DETECTED
HCOV 229E RNA SPEC QL NAA+PROBE: NOT DETECTED
HCOV HKU1 RNA SPEC QL NAA+PROBE: NOT DETECTED
HCOV NL63 RNA SPEC QL NAA+PROBE: NOT DETECTED
HCOV OC43 RNA SPEC QL NAA+PROBE: NOT DETECTED
HCT VFR BLD AUTO: 40.5 % (ref 37.5–51)
HDLC SERPL-MCNC: 27 MG/DL (ref 40–60)
HGB BLD-MCNC: 13 G/DL (ref 13–17.7)
HMPV RNA NPH QL NAA+NON-PROBE: NOT DETECTED
HPIV1 RNA ISLT QL NAA+PROBE: NOT DETECTED
HPIV2 RNA SPEC QL NAA+PROBE: NOT DETECTED
HPIV3 RNA NPH QL NAA+PROBE: NOT DETECTED
HPIV4 P GENE NPH QL NAA+PROBE: NOT DETECTED
IMM GRANULOCYTES # BLD AUTO: 0.03 10*3/MM3 (ref 0–0.05)
IMM GRANULOCYTES NFR BLD AUTO: 0.3 % (ref 0–0.5)
LDLC SERPL CALC-MCNC: 93 MG/DL (ref 0–100)
LDLC/HDLC SERPL: 3.37 {RATIO}
LYMPHOCYTES # BLD AUTO: 1.05 10*3/MM3 (ref 0.7–3.1)
LYMPHOCYTES NFR BLD AUTO: 11.4 % (ref 19.6–45.3)
M PNEUMO IGG SER IA-ACNC: NOT DETECTED
MAGNESIUM SERPL-MCNC: 1.9 MG/DL (ref 1.6–2.4)
MCH RBC QN AUTO: 29.5 PG (ref 26.6–33)
MCHC RBC AUTO-ENTMCNC: 32.1 G/DL (ref 31.5–35.7)
MCV RBC AUTO: 91.8 FL (ref 79–97)
MONOCYTES # BLD AUTO: 0.53 10*3/MM3 (ref 0.1–0.9)
MONOCYTES NFR BLD AUTO: 5.7 % (ref 5–12)
NEUTROPHILS NFR BLD AUTO: 7.44 10*3/MM3 (ref 1.7–7)
NEUTROPHILS NFR BLD AUTO: 80.7 % (ref 42.7–76)
NRBC BLD AUTO-RTO: 0 /100 WBC (ref 0–0.2)
PLATELET # BLD AUTO: 297 10*3/MM3 (ref 140–450)
PMV BLD AUTO: 10.2 FL (ref 6–12)
POTASSIUM SERPL-SCNC: 3.7 MMOL/L (ref 3.5–5.2)
RBC # BLD AUTO: 4.41 10*6/MM3 (ref 4.14–5.8)
RHINOVIRUS RNA SPEC NAA+PROBE: NOT DETECTED
RSV RNA NPH QL NAA+NON-PROBE: NOT DETECTED
SARS-COV-2 RNA RESP QL NAA+PROBE: NOT DETECTED
SODIUM SERPL-SCNC: 138 MMOL/L (ref 136–145)
TRIGL SERPL-MCNC: 110 MG/DL (ref 0–150)
VIT B12 BLD-MCNC: 328 PG/ML (ref 211–946)
VLDLC SERPL-MCNC: 20 MG/DL (ref 5–40)
WBC NRBC COR # BLD: 9.23 10*3/MM3 (ref 3.4–10.8)

## 2023-10-30 PROCEDURE — 0202U NFCT DS 22 TRGT SARS-COV-2: CPT | Performed by: INTERNAL MEDICINE

## 2023-10-30 PROCEDURE — 83519 RIA NONANTIBODY: CPT | Performed by: INTERNAL MEDICINE

## 2023-10-30 PROCEDURE — 82948 REAGENT STRIP/BLOOD GLUCOSE: CPT

## 2023-10-30 PROCEDURE — 36415 COLL VENOUS BLD VENIPUNCTURE: CPT | Performed by: INTERNAL MEDICINE

## 2023-10-30 PROCEDURE — 97161 PT EVAL LOW COMPLEX 20 MIN: CPT

## 2023-10-30 PROCEDURE — 80048 BASIC METABOLIC PNL TOTAL CA: CPT | Performed by: INTERNAL MEDICINE

## 2023-10-30 PROCEDURE — 80061 LIPID PANEL: CPT | Performed by: INTERNAL MEDICINE

## 2023-10-30 PROCEDURE — G0378 HOSPITAL OBSERVATION PER HR: HCPCS

## 2023-10-30 PROCEDURE — 63710000001 INSULIN LISPRO (HUMAN) PER 5 UNITS: Performed by: INTERNAL MEDICINE

## 2023-10-30 PROCEDURE — 86255 FLUORESCENT ANTIBODY SCREEN: CPT | Performed by: INTERNAL MEDICINE

## 2023-10-30 PROCEDURE — 97166 OT EVAL MOD COMPLEX 45 MIN: CPT

## 2023-10-30 PROCEDURE — 83735 ASSAY OF MAGNESIUM: CPT | Performed by: INTERNAL MEDICINE

## 2023-10-30 PROCEDURE — 86618 LYME DISEASE ANTIBODY: CPT | Performed by: INTERNAL MEDICINE

## 2023-10-30 PROCEDURE — 85025 COMPLETE CBC W/AUTO DIFF WBC: CPT | Performed by: INTERNAL MEDICINE

## 2023-10-30 RX ORDER — PYRIDOSTIGMINE BROMIDE 60 MG/1
30 TABLET ORAL 3 TIMES DAILY
Status: DISCONTINUED | OUTPATIENT
Start: 2023-10-30 | End: 2023-10-30 | Stop reason: HOSPADM

## 2023-10-30 RX ORDER — FUROSEMIDE 40 MG/1
1 TABLET ORAL DAILY
Status: ON HOLD | COMMUNITY
Start: 2023-07-10

## 2023-10-30 RX ORDER — DILTIAZEM HYDROCHLORIDE 240 MG/1
240 CAPSULE, COATED, EXTENDED RELEASE ORAL DAILY
Status: ON HOLD | COMMUNITY

## 2023-10-30 RX ORDER — PYRIDOSTIGMINE BROMIDE 60 MG/1
30 TABLET ORAL 3 TIMES DAILY
Qty: 45 TABLET | Refills: 0 | Status: ON HOLD | OUTPATIENT
Start: 2023-10-30 | End: 2023-11-29

## 2023-10-30 RX ORDER — METOPROLOL SUCCINATE 50 MG/1
50 TABLET, EXTENDED RELEASE ORAL DAILY
Status: DISCONTINUED | OUTPATIENT
Start: 2023-10-30 | End: 2023-10-30 | Stop reason: HOSPADM

## 2023-10-30 RX ORDER — APIXABAN 5 MG/1
1 TABLET, FILM COATED ORAL 2 TIMES DAILY
Status: ON HOLD | COMMUNITY

## 2023-10-30 RX ADMIN — INSULIN LISPRO 8 UNITS: 100 INJECTION, SOLUTION INTRAVENOUS; SUBCUTANEOUS at 11:46

## 2023-10-30 RX ADMIN — ALLOPURINOL 300 MG: 300 TABLET ORAL at 08:33

## 2023-10-30 RX ADMIN — DIGOXIN 250 MCG: 250 TABLET ORAL at 08:32

## 2023-10-30 RX ADMIN — SPIRONOLACTONE 25 MG: 25 TABLET ORAL at 08:30

## 2023-10-30 RX ADMIN — FLUTICASONE PROPIONATE 1 SPRAY: 50 SPRAY, METERED NASAL at 08:32

## 2023-10-30 RX ADMIN — INSULIN LISPRO 8 UNITS: 100 INJECTION, SOLUTION INTRAVENOUS; SUBCUTANEOUS at 08:31

## 2023-10-30 RX ADMIN — GABAPENTIN 600 MG: 300 CAPSULE ORAL at 06:47

## 2023-10-30 RX ADMIN — PYRIDOSTIGMINE BROMIDE 30 MG: 60 TABLET ORAL at 11:53

## 2023-10-30 RX ADMIN — APIXABAN 5 MG: 5 TABLET, FILM COATED ORAL at 08:31

## 2023-10-30 RX ADMIN — Medication 10 ML: at 08:33

## 2023-10-30 RX ADMIN — METOPROLOL SUCCINATE 50 MG: 50 TABLET, EXTENDED RELEASE ORAL at 08:29

## 2023-10-30 NOTE — PLAN OF CARE
Goal Outcome Evaluation:  Plan of Care Reviewed With: patient           Outcome Evaluation: Pt presents with impairments in balance, endurance/activity tolerance, and strength affecting functional mobility. He will benefit from skilled PT intervention and placement in a rehabilitation facility upon discharge.      Anticipated Discharge Disposition (PT): sub acute care setting

## 2023-10-30 NOTE — DISCHARGE SUMMARY
[facility]         HOSPITALIST  DISCHARGE SUMMARY    Patient Name: Niall Mak  : 1960  MRN: [id number]    Date of Admission: 10/29/2023  Date of Discharge: 10/30/2023  Primary Care Physician: Shai Azar PA    Consults       Date and Time Order Name Status Description    10/29/2023  5:27 PM Inpatient Neurology Consult General      10/29/2023  5:05 PM Hospitalist (on-call MD unless specified)      10/29/2023  1:24 PM Inpatient Neurology Consult General Completed             Active and Resolved Hospital Problems:  Active Hospital Problems    Diagnosis POA    **6th nerve palsy [H49.20] Yes      Resolved Hospital Problems   No resolved problems to display.   Horizontal diplopia, concern for cranial nerve VI palsy versus myasthenia gravis  Type 2 diabetes mellitus A1c 8.8  Paroxysmal atrial fibrillation on Eliquis  Hypertension  Chronic diastolic congestive heart failure, not currently in acute exacerbation  Super morbid obesity    Hospital Course     Hospital Course:  Niall Mak is a 63 y.o. male PMH morbid obesity, CHF, type 2 diabetes mellitus, hypertension, A-fib on Eliqu, who presented to the ED on 10/29 due to complaints of persistent horizontal diplopia.  He states his symptoms started this past Friday and has been persistent. He has been using an eye patch and closing 1 eye at a time to help with the symptoms.  Has had difficulty with ambulation and unsteadiness.  He does report a recent viral infection, he has had diarrhea, feeling achy, fatigue, myalgias.  No upper respiratory symptoms.  MRI was obtained in the ED which was negative for acute CVA.  However, the patient was unable to ambulate safely and cannot return home. He was admitted for further care, myasthenia gravis profile was sent and pending at the time of this note.  Teleneurology was consulted and started the patient on a trial of Mestinon.  PT/OT consulted.  Patient requested home health and did  not want to be placed in rehab.  He is set up with appropriate DME and discharged home in stable condition on 10/30/2023.  Recommend follow-up with PCP within 1 week, recommend follow-up with neurology within 1 month.  He will continue to use eye patch as daily, alternating eyes.    DISCHARGE Follow Up Recommendations for labs and diagnostics: Follow-up myasthenia gravis profile and acetylcholine receptor antibody    Day of Discharge     Vital Signs:  Temp:  [97.2 °F (36.2 °C)-98.1 °F (36.7 °C)] 97.2 °F (36.2 °C)  Heart Rate:  [50-95] 78  Resp:  [17-20] 20  BP: (137-164)/(70-87) 164/87  Physical Exam:              Constitutional: Obese male, awake, alert, no acute distress              Respiratory: Clear to auscultation bilaterally, nonlabored respirations               Cardiovascular: RRR, no MRG              Gastrointestinal: Positive bowel sounds, soft, nontender, nondistended              Neurologic: Oriented x 3, strength symmetric in all extremities, Cranial Nerves grossly intact to confrontation, speech clear               EXT: Significant lymphedema noted lower extremities, dry crusting skin of abdomen noted      Discharge Details        Discharge Medications        New Medications        Instructions Start Date   pyridostigmine 60 MG tablet  Commonly known as: MESTINON   30 mg, Oral, 3 Times Daily             Continue These Medications        Instructions Start Date   albuterol sulfate  (90 Base) MCG/ACT inhaler  Commonly known as: PROVENTIL HFA;VENTOLIN HFA;PROAIR HFA   2 puffs, Inhalation, Every 6 Hours PRN      allopurinol 300 MG tablet  Commonly known as: ZYLOPRIM   300 mg, Oral, Daily      Cinnamon 500 MG capsule   500 mg, Oral, 2 times daily      digoxin 250 MCG tablet  Commonly known as: LANOXIN   250 mcg, Oral, Daily      dilTIAZem  MG 24 hr capsule  Commonly known as: CARDIZEM CD   240 mg, Oral, Daily      Eliquis 5 MG tablet tablet  Generic drug: apixaban   1 tablet, Oral, 2 Times  Daily      fluticasone 50 MCG/ACT nasal spray  Commonly known as: FLONASE   1 spray, Nasal, Daily      furosemide 40 MG tablet  Commonly known as: LASIX   1 tablet, Oral, Daily      gabapentin 600 MG tablet  Commonly known as: NEURONTIN   Take 1 tablet by mouth 3 (Three) Times a Day.      glimepiride 4 MG tablet  Commonly known as: AMARYL   4 mg, Oral, 2 Times Daily      HYDROcodone-acetaminophen 5-325 MG per tablet  Commonly known as: NORCO   1 tablet, Oral, Every 12 Hours PRN      metFORMIN 500 MG tablet  Commonly known as: GLUCOPHAGE   1,000 mg, Oral, 2 Times Daily With Meals, Take 2 tablets twice a day      metoprolol succinate  MG 24 hr tablet  Commonly known as: TOPROL-XL   Take 1 tablet by mouth Daily.      potassium chloride 10 MEQ CR tablet   10 mEq, Oral, Daily      Turmeric 400 MG capsule   400 mg, Oral, Daily      ZyrTEC Allergy 10 MG capsule  Generic drug: Cetirizine HCl   10 mg, Oral, Daily               Allergies   Allergen Reactions    Cucumber Extract Unknown - High Severity and Other (See Comments)    Influenza Virus Vaccine Unknown - High Severity    Sitagliptin Unknown - High Severity    [place] Unknown - High Severity    Tomato Unknown - High Severity and Other (See Comments)       Discharge Disposition:  Home-Health Care c    Diet:  Hospital:  Diet Order   Procedures    Diet: Diabetic Diets; Consistent Carbohydrate; Texture: Regular Texture (IDDSI 7); Fluid Consistency: Thin (IDDSI 0)       Discharge Activity:   Activity Instructions       Activity as Tolerated              CODE STATUS:  Code Status and Medical Interventions:   Ordered at: 10/29/23 2789     Level Of Support Discussed With:    Patient     Code Status (Patient has no pulse and is not breathing):    CPR (Attempt to Resuscitate)     Medical Interventions (Patient has pulse or is breathing):    Full Support         Future Appointments   Date Time Provider Department Center   11/6/2023  3:30 PM Des Ahumada DPM  [facility] POD [facility]   11/10/2023  1:00 PM Sylwia Muhammad, APRN [facility] DIAB ET [facility]       Additional Instructions for the Follow-ups that You Need to Schedule       Discharge Follow-up with PCP   As directed       Currently Documented PCP:    Shai Azar PA    PCP Phone Number:    [phone]     Follow Up Details: 3-5 days        Discharge Follow-up with Specified Provider: Neurology Dr. Valerio; 2 Weeks   As directed      To: Neurology Dr. Valerio   Follow Up: 2 Weeks                Pertinent  and/or Most Recent Results     PROCEDURES:   None    LAB RESULTS:      Lab 10/30/23  0512 10/29/23  1137   WBC 9.23 8.95   HEMOGLOBIN 13.0 13.2   HEMATOCRIT 40.5 41.6   PLATELETS 297 268   NEUTROS ABS 7.44* 6.82   IMMATURE GRANS (ABS) 0.03 0.03   LYMPHS ABS 1.05 1.31   MONOS ABS 0.53 0.55   EOS ABS 0.15 0.20   MCV 91.8 92.7   PROTIME  --  14.1         Lab 10/30/23  0512 10/29/23  1137   SODIUM 138 138   POTASSIUM 3.7 3.9   CHLORIDE 101 100   CO2 28.0 29.4*   ANION GAP 9.0 8.6   BUN 11 14   CREATININE 0.68* 0.85   EGFR 104.4 97.6   GLUCOSE 251* 233*   CALCIUM 9.3 9.5   MAGNESIUM 1.9 1.7   TSH  --  2.390         Lab 10/29/23  1137   TOTAL PROTEIN 7.6   ALBUMIN 3.8   GLOBULIN 3.8   ALT (SGPT) 12   AST (SGOT) 11   BILIRUBIN 0.4   ALK PHOS 54         Lab 10/29/23  1137   PROTIME 14.1   INR 1.08         Lab 10/30/23  0512   CHOLESTEROL 140   LDL CHOL 93   HDL CHOL 27*   TRIGLYCERIDES 110         Lab 10/29/23  1137   VITAMIN B 12 328         Brief Urine Lab Results       None          Microbiology Results (last 10 days)       Procedure Component Value - Date/Time    Respiratory Panel PCR w/COVID-19(SARS-CoV-2) JAN/BEATRIZ/MICHAEL/PAD/COR/MAD/YARON In-House, NP Swab in UTM/VTM, 3-4 HR TAT - Swab, Nasopharynx [[id number]]  (Normal) Collected: 10/30/23 0153    Lab Status: Final result Specimen: Swab from Nasopharynx Updated: 10/30/23 0246     ADENOVIRUS, PCR Not Detected     Coronavirus 229E Not Detected     Coronavirus HKU1 Not  Detected     Coronavirus NL63 Not Detected     Coronavirus OC43 Not Detected     COVID19 Not Detected     Human Metapneumovirus Not Detected     Human Rhinovirus/Enterovirus Not Detected     Influenza A PCR Not Detected     Influenza B PCR Not Detected     Parainfluenza Virus 1 Not Detected     Parainfluenza Virus 2 Not Detected     Parainfluenza Virus 3 Not Detected     Parainfluenza Virus 4 Not Detected     RSV, PCR Not Detected     Bordetella pertussis pcr Not Detected     Bordetella parapertussis PCR Not Detected     Chlamydophila pneumoniae PCR Not Detected     Mycoplasma pneumo by PCR Not Detected    Narrative:      In the setting of a positive respiratory panel with a viral infection PLUS a negative procalcitonin without other underlying concern for bacterial infection, consider observing off antibiotics or discontinuation of antibiotics and continue supportive care. If the respiratory panel is positive for atypical bacterial infection (Bordetella pertussis, Chlamydophila pneumoniae, or Mycoplasma pneumoniae), consider antibiotic de-escalation to target atypical bacterial infection.            MRI Brain Without Contrast    Result Date: 10/29/2023    1. No evidence for acute focal ischemia. 2. No evidence for abnormal focal enhancement or abnormal enhancing mass. 3. Nonspecific white matter signal changes seen bilaterally likely related to chronic small vessel ischemic changes or age-related changes. Associated diffuse volume loss is observed.        MOE MURPHY MD       Electronically Signed and Approved By: MOE MURPHY MD on 10/29/2023 at 16:12             CT Head Without Contrast    Result Date: 10/29/2023    1. No evidence for acute intracranial abnormality.     MOE MURPHY MD       Electronically Signed and Approved By: MOE MURPHY MD on 10/29/2023 at 12:13                       Results for orders placed during the hospital encounter of 07/25/21    Adult Transthoracic Echo Complete W/ Cont if  Necessary Per Protocol    Interpretation Summary  · Left ventricular wall thickness is consistent with mild posterior asymmetric hypertrophy.  · The right atrial cavity is mildly dilated.  · Left ventricular ejection fraction appears to be 56 - 60%.      Labs Pending at Discharge:  Pending Labs       Order Current Status    Acetylcholine Receptor Antibody Panel In process    Lyme Disease Total Antibody With Reflex to Immunoassay In process    Myasthenia Gravis Profile In process              Time spent on Discharge including face to face service: 34 minutes    Electronically signed by Vinicio Christy MD, 10/30/23, 2:30 PM EDT.

## 2023-10-30 NOTE — PLAN OF CARE
Goal Outcome Evaluation:         Patient being dc home. IV and tele removed. EMS called. Will continue with poc.

## 2023-10-30 NOTE — OUTREACH NOTE
Prep Survey      Flowsheet Row Responses   [facility] facility patient discharged from? [place]   Is LACE score < 7 ? Yes   Eligibility Not Eligible   What are the reasons patient is not eligible? Other  [Admission Risk LOW]   Does the patient have one of the following disease processes/diagnoses(primary or secondary)? Other   Prep survey completed? Yes            Naina MIRANDA - Registered Nurse

## 2023-10-30 NOTE — PROGRESS NOTES
[facility]  [facility] Tele[facility] Consult Services    Routine Consult Follow-Up    Patient Name:   Niall Mak  YOB: 1960  Identification Number:   MRN - [id number]  Date of Service:   10/30/2023 09:31:20    Diagnosis        H53.2 - Diplopia    Impression  63 year old man with history of atrial fibrillation presenting for evaluation of persistent horizontal diplopia since Friday 10/27. He also complains of unsteady gait, has extensive lymphedema in legs and abdomen, but reports ambulating with no assistance at baseline, denies similar episodes in the past.     FINDINGS: - MRI of the brain no acute abnormality.     DIAGNOSIS: 1. Bilateral gaze palsy and difficulty raising eyes vertically. DDx myasthenia gravis vs less likely ischemic cranial neuropathy. MRI of the brain is negative for acute findings 2. Atrial fibrillation.     PLAN:   - Recommend eye patch, alternating eyes every 2 hours while awake.   - Recommend myasthenia panel; acetylcholinesterase antibodies. Outpatient follow up on these labs.   - The patient would like to be treated in the meanwhile possible myastenia therefore will start Mestinon 30mg TID and this could be titrated upward as outpatient by neurology.   - Neurology will follow while inpatient. Otherwise, he can followup with neurology in 1-3 weeks after discharge.    Our recommendations are outlined below    Dispositions :  Neurology will follow    Subjective  Patient was seen and examined. He reports headache behind his eyes.      Examination    Neuro Exam:  General: Alert,Awake, Oriented to Time, Place, Person    Speech: Fluent:    Language: Intact:    Face: Symmetric:    Facial Sensation: Intact:    Extraocular Movements: Intact:  bilateral lateral gaze palsy. difficulty raising eyes vertically;    Motor Exam: No Drift:  no clear drift but patient was lying on left sie    Sensation: Reduced:  diminished below knees, chronic    Coordination:  Intact:          Patient/Family was informed the Neurology Consult would happen via TeleHealth consult by way of interactive audio and video telecommunications and consented to receiving care in this manner.    Telehealth Neurology consultation was provided. I spent 25 minutes providing telehealth care. This includes time spent for face to face visit via telemedicine, review of medical records, imaging studies and discussion of findings with providers, the patient and/or family.      Dr Cristi Curtis      [facility]  For Inpatient follow-up with [facility] physician please call [facility] [phone]. This is not an outpatient service. Post hospital discharge, please contact hospital directly.

## 2023-10-30 NOTE — SIGNIFICANT NOTE
Wound Eval / Progress Noted    SELVIN Hutchinson     Patient Name: Niall Mak  : 1960  MRN: [id number]  Today's Date: 10/30/2023                 Admit Date: 10/29/2023    Visit Dx:    ICD-10-CM ICD-9-CM   1. Right abducens nerve palsy  H49.21 378.54   2. Unstable gait  R26.81 781.2   3. Decreased activities of daily living (ADL)  Z78.9 V49.89         6th nerve palsy        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        Past Surgical History:   Procedure Laterality Date    COLONOSCOPY      TONSILLECTOMY       Wound Check / Follow-up: Patient seen today for wound consult. Patient with dry, thickened skin noted to lower abdomen. Recommending BID topical treatment with application of triamcinolone ointment. Blanchable redness noted to gluteal aspects. Recommending skin protection with application of barrier cream.      Impression: Dry, thickened skin. Blanchable redness.      Short term goals: Regain skin integrity, skin protection, topical treatment, skin care.       Leticia Delacruz RN    10/30/2023    14:59 EDT

## 2023-10-30 NOTE — THERAPY EVALUATION
Acute Care - Physical Therapy Initial Evaluation   [place]     Patient Name: Niall Mak  : 1960  MRN: [id number]  Today's Date: 10/30/2023      Visit Dx:     ICD-10-CM ICD-9-CM   1. Right abducens nerve palsy  H49.21 378.54   2. Unstable gait  R26.81 781.2   3. Decreased activities of daily living (ADL)  Z78.9 V49.89   4. Difficulty walking  R26.2 719.7     Patient Active Problem List   Diagnosis    Sepsis    Atrial fibrillation with rapid ventricular response    6th nerve palsy     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     Past Surgical History:   Procedure Laterality Date    COLONOSCOPY      TONSILLECTOMY       PT Assessment (last 12 hours)       PT Evaluation and Treatment       Row Name 10/30/23 1400          Physical Therapy Time and Intention    Subjective Information no complaints  -DP     Document Type evaluation  -DP     Mode of Treatment individual therapy;physical therapy  -DP     Patient Effort adequate  -DP     Symptoms Noted During/After Treatment fatigue  -DP       Row Name 10/30/23 1400          General Information    Patient Profile Reviewed yes  -DP     Patient Observations alert;cooperative;agree to therapy  -DP     Prior Level of Function independent:;gait;transfer;bed mobility  Pt participates in furniture walking for ambulation at home.  -DP     Equipment Currently Used at Home none  -DP     Existing Precautions/Restrictions fall  -DP     Barriers to Rehab none identified  -DP       Row Name 10/30/23 1400          Living Environment    Current Living Arrangements home  -DP     Home Accessibility stairs to enter home  -DP     People in Home spouse  Wife  -DP     Primary Care Provided by self;spouse/significant other  -DP       Row Name 10/30/23 1400           Home Main Entrance    Number of Stairs, Main Entrance one  -DP       Row Name 10/30/23 1400          Stairs Within Home, Primary    Number of Stairs, Within Home, Primary one  -DP     Stairs Comment, Within Home, Primary 1 to enter family room  -DP       Row Name 10/30/23 1400          Home Use of Assistive/Adaptive Equipment    Equipment Currently Used at Home none  -DP       Row Name 10/30/23 1400          Cognition    Orientation Status (Cognition) oriented x 4  -DP     Personal Safety Interventions nonskid shoes/slippers when out of bed  -DP       Row Name 10/30/23 1400          Range of Motion Comprehensive    General Range of Motion lower extremity range of motion deficits identified  BLE ROM limited due to morbid obesity  -DP       Row Name 10/30/23 1400          Strength (Manual Muscle Testing)    Strength (Manual Muscle Testing) --  Not tested  -DP       Row Name 10/30/23 1400          Bed Mobility    Bed Mobility supine-sit  -DP     Supine-Sit [place] (Bed Mobility) minimum assist (75% patient effort)  -DP     Assistive Device (Bed Mobility) head of bed elevated  -DP       Row Name 10/30/23 1400          Transfers    Transfers sit-stand transfer  -DP       Row Name 10/30/23 1400          Sit-Stand Transfer    Sit-Stand [place] (Transfers) minimum assist (75% patient effort)  -DP     Assistive Device (Sit-Stand Transfers) bariatric;walker, front-wheeled  -DP       Row Name 10/30/23 1400          Gait/Stairs (Locomotion)    Gait/Stairs Locomotion gait/ambulation assistive device  -DP     [place] Level (Gait) minimum assist (75% patient effort)  -DP     Assistive Device (Gait) bariatric equipment;walker, front-wheeled  -DP     Patient was able to Ambulate yes  -DP     Distance in Feet (Gait) --  Pt ambulated 4' forwards and backwards and 3' from bed to recliner.  -DP       Row Name 10/30/23 1400          Safety Issues, Functional Mobility    Impairments Affecting Function (Mobility)  balance;endurance/activity tolerance;strength  -DP       Row Name 10/30/23 1400          Balance    Balance Assessment standing dynamic balance  -DP     Dynamic Standing Balance minimal assist  -DP     Position/Device Used, Standing Balance supported;walker, front-wheeled  Bariatric walker  -DP       Row Name             Wound 07/26/21 0400 medial abdomen Blisters    Wound - Properties Group Placement Date: 07/26/21  -DG Placement Time: 0400  -DG Present on Original Admission: Y  -DG Orientation: medial  -DG Location: abdomen  -DG Primary Wound Type: Blisters  -DG    Retired Wound - Properties Group Placement Date: 07/26/21  -DG Placement Time: 0400  -DG Present on Original Admission: Y  -DG Orientation: medial  -DG Location: abdomen  -DG Primary Wound Type: Blisters  -DG    Retired Wound - Properties Group Date first assessed: 07/26/21  -DG Time first assessed: 0400  -DG Present on Original Admission: Y  -DG Location: abdomen  -DG Primary Wound Type: Blisters  -DG      Row Name 10/30/23 1400          Plan of Care Review    Plan of Care Reviewed With patient  -DP     Outcome Evaluation Pt presents with impairments in balance, endurance/activity tolerance, and strength affecting functional mobility. He will benefit from skilled PT intervention and placement in a rehabilitation facility upon discharge.  -DP       Row Name 10/30/23 1400          Positioning and Restraints    Pre-Treatment Position in bed  -DP     Post Treatment Position chair  -DP     In Chair call light within reach;encouraged to call for assist;with nsg  -DP       Row Name 10/30/23 1400          Therapy Assessment/Plan (PT)    Rehab Potential (PT) good, to achieve stated therapy goals  -DP     Criteria for Skilled Interventions Met (PT) yes;meets criteria;skilled treatment is necessary  -DP     Problem List (PT) problems related to;balance;mobility;strength  -DP     Activity Limitations Related to Problem List (PT) unable to ambulate safely;unable to  transfer safely  -DP       Row Name 10/30/23 1400          PT Evaluation Complexity    History, PT Evaluation Complexity 1-2 personal factors and/or comorbidities  -DP     Examination of Body Systems (PT Eval Complexity) total of 4 or more elements  -DP     Clinical Presentation (PT Evaluation Complexity) stable  -DP     Clinical Decision Making (PT Evaluation Complexity) low complexity  -DP     Overall Complexity (PT Evaluation Complexity) low complexity  -DP       Row Name 10/30/23 1400          Physical Therapy Goals    Bed Mobility Goal Selection (PT) bed mobility, PT goal 1  -DP     Transfer Goal Selection (PT) transfer, PT goal 1  -DP     Gait Training Goal Selection (PT) gait training, PT goal 1  -DP       Row Name 10/30/23 1400          Bed Mobility Goal 1 (PT)    Activity/Assistive Device (Bed Mobility Goal 1, PT) supine to sit  -DP     [place] Level/Cues Needed (Bed Mobility Goal 1, PT) standby assist  -DP     Time Frame (Bed Mobility Goal 1, PT) 10 days  -DP       Row Name 10/30/23 1400          Transfer Goal 1 (PT)    Activity/Assistive Device (Transfer Goal 1, PT) sit-to-stand/stand-to-sit  Bariatric front-wheeled walker  -DP     [place] Level/Cues Needed (Transfer Goal 1, PT) standby assist  -DP     Time Frame (Transfer Goal 1, PT) 10 days  -DP       Row Name 10/30/23 1400          Gait Training Goal 1 (PT)    Activity/Assistive Device (Gait Training Goal 1, PT) gait (walking locomotion);assistive device use;improve balance and speed;increase endurance/gait distance  Bariatric front-wheeled walker  -DP     [place] Level (Gait Training Goal 1, PT) standby assist  -DP     Distance (Gait Training Goal 1, PT) 150  -DP     Time Frame (Gait Training Goal 1, PT) 10 days  -DP               User Key  (r) = Recorded By, (t) = Taken By, (c) = Cosigned By      Initials Name Provider Type    Tiera Montelongo, RN Registered Nurse    Kimberly Martinez, PT Physical Therapist                      PT  Recommendation and Plan  Anticipated Discharge Disposition (PT): sub acute care setting  Planned Therapy Interventions (PT): balance training, bed mobility training, gait training, strengthening, transfer training  Therapy Frequency (PT): daily  Plan of Care Reviewed With: patient  Outcome Evaluation: Pt presents with impairments in balance, endurance/activity tolerance, and strength affecting functional mobility. He will benefit from skilled PT intervention and placement in a rehabilitation facility upon discharge.   Outcome Measures       Row Name 10/30/23 1500             How much help from another person do you currently need...    Turning from your back to your side while in flat bed without using bedrails? 3  -DP      Moving from lying on back to sitting on the side of a flat bed without bedrails? 3  -DP      Moving to and from a bed to a chair (including a wheelchair)? 3  -DP      Standing up from a chair using your arms (e.g., wheelchair, bedside chair)? 3  -DP      Climbing 3-5 steps with a railing? 2  -DP      To walk in hospital room? 3  -DP      AM-PAC 6 Clicks Score (PT) 17  -DP      Highest level of mobility 5 --> Static standing  -DP                User Key  (r) = Recorded By, (t) = Taken By, (c) = Cosigned By      Initials Name Provider Type    Kimberly Martinez, PT Physical Therapist                     Time Calculation:    PT Charges       Row Name 10/30/23 1513 10/30/23 1512          Time Calculation    PT Received On -- 10/30/23  -DP     PT Goal Re-Cert Due Date -- 11/08/23  -DP        Untimed Charges    PT Eval/Re-eval Minutes 50  -DP --        Total Minutes    Untimed Charges Total Minutes 50  -DP --      Total Minutes 50  -DP --               User Key  (r) = Recorded By, (t) = Taken By, (c) = Cosigned By      Initials Name Provider Type    Kimberly Martinez, PT Physical Therapist                      PT G-Codes  Outcome Measure Options: AM-PAC 6 Clicks Daily Activity (OT), Optimal  Instrument  AM-PAC 6 Clicks Score (PT): 17  AM-PAC 6 Clicks Score (OT): 12    Kimberly Munoz, PT  10/30/2023

## 2023-10-30 NOTE — TELEPHONE ENCOUNTER
Caller: CHRISTINA    Relationship to patient: [facility]  [facility]    [facility] call back number: CALL PT TO DAWN    New or established patient?  [x] New  [] Established    Date of discharge: 10-30-23    Facility discharged from:  [facility]    Diagnosis/Symptoms: Diplopia     Specialty Only: Did you see a [facility] provider?    [x] Yes  [] No  If so, who?     Cristi Curtis MD   Physician  Neurology    Date of Service: 10/30/23 0952  Creation Time: 10/30/23 0952      Discharge Follow-up with Specified Provider: Neurology Dr. Valerio; 2 Weeks   As directed         To: Neurology Dr. Valerio   Follow Up: 2 Weeks

## 2023-10-30 NOTE — PLAN OF CARE
"Goal Outcome Evaluation:  Plan of Care Reviewed With: patient        Progress: no change  Outcome Evaluation: pt admitted to floor this shift. pt c/o double vision and blurriness. pt does have right sided weakness. pt was negative for stroke, covid, flu and rsv. nuero checks q4hrs. pt did refuse to have ambriz plced. pt stated \" lay some towels down and ill pee on the floor.\" pt educated to use the call light if needed to urine and we will assist with urinal if needed. no other changes this shift. Jenny Gordon RN          " Pt had a recent hospital visit, treated for yeast infection.  Did also have some clue cells.  Does have vaginal discharge, but denies odor, discomfort related to this.  Feeling better than when she went in for evaluation.  Just getting more uncomfortable with this stage of pregnancy.  Doing well.  No concerns today.  Pt declined GC/chlamydia screens  Cephalic? position confirmed by Leopold maneuvers.  Prenatal flowsheet information is reviewed.  Discussed PTL, PROM, and when to call or come in.  Reportable signs and symptoms discussed.  Plan repeat u/s after next visit.  F/u in 2 weeks.

## 2023-10-30 NOTE — PLAN OF CARE
Goal Outcome Evaluation:  Plan of Care Reviewed With: patient        Progress: no change (First session for evaluation)  Outcome Evaluation: Patient presents with limitations of balance, endurance/activity tolerance, strength and visual perception which impede his ability to perform ADL and transfers as prior.  The skills of a therapist will be required to safely and effectively implement treatment plan to restore maximal level of function.      Anticipated Discharge Disposition (OT): sub acute care setting

## 2023-10-30 NOTE — THERAPY EVALUATION
Patient Name: Niall Mak  : 1960    MRN: [id number]                              Today's Date: 10/30/2023       Admit Date: 10/29/2023    Visit Dx:     ICD-10-CM ICD-9-CM   1. Right abducens nerve palsy  H49.21 378.54   2. Unstable gait  R26.81 781.2   3. Decreased activities of daily living (ADL)  Z78.9 V49.89     Patient Active Problem List   Diagnosis    Sepsis    Atrial fibrillation with rapid ventricular response    6th nerve palsy     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     Past Surgical History:   Procedure Laterality Date    COLONOSCOPY      TONSILLECTOMY        General Information       Row Name 10/30/23 1100          OT Time and Intention    Document Type evaluation  -AV     Mode of Treatment individual therapy;occupational therapy  -AV       Row Name 10/30/23 1100          General Information    Patient Profile Reviewed yes  -AV     Prior Level of Function --  (I) feeding. (I) grooming standing at sink.  Stands to shower (tub with grab bar). Max assist bathing/dressing x3 years.  Independent toileting (comfort height commode).  Ambulates room to room without assistive device (hold onto walls).  No home oxygen.  -AV     Existing Precautions/Restrictions fall  -AV     Barriers to Rehab previous functional deficit  -AV       Row Name 10/30/23 1100          Occupational Profile    Reason for Services/Referral (Occupational Profile) Patient is a 63 year old male admitted to [facility] on 2023 with double vision.  He is currently on fourth floor/room air.  Per EMR review, negative CT and MRI.  OT consulted due to recent decline in ADL/transfer independence.  No previous OT services for current condition.  -AV       Row Name 10/30/23 1100        "   Living Environment    People in Home spouse  -AV       Row Name 10/30/23 1100          Home Main Entrance    Number of Stairs, Main Entrance one  -AV       Row Name 10/30/23 1100          Stairs Within Home, Primary    Number of Stairs, Within Home, Primary one  -AV     Stairs Comment, Within Home, Primary 1 to enter family room  -AV       Row Name 10/30/23 1100          Cognition    Orientation Status (Cognition) --  Patient is alert, pleasant and cooperative- able to retain information and follow commands.  -AV       Row Name 10/30/23 1100          Safety Issues, Functional Mobility    Impairments Affecting Function (Mobility) balance;endurance/activity tolerance;visual/perceptual;strength  -AV               User Key  (r) = Recorded By, (t) = Taken By, (c) = Cosigned By      Initials Name Provider Type    Armond Ayers OT Occupational Therapist                     Mobility/ADL's       Row Name 10/30/23 1103          Transfers    Comment, (Transfers) Assist of 3 per nursing  -AV       Row Name 10/30/23 1103          Activities of Daily Living    BADL Assessment/Intervention --  Independent feeding with set up \"very sloppily\".  Mod assist grooming.  Max-dependent bathing/dressing.  -AV               User Key  (r) = Recorded By, (t) = Taken By, (c) = Cosigned By      Initials Name Provider Type    Armond Ayers OT Occupational Therapist                   Obj/Interventions       Row Name 10/30/23 1104          Sensory Assessment (Somatosensory)    Sensory Assessment (Somatosensory) UE sensation intact  -AV     Sensory Assessment Sensory awareness to light touch intact in upper extremities at this time.  He reports lower extremity neuropathy x2.5 years  -AV       Row Name 10/30/23 1104          Vision Assessment/Intervention    Vision Assessment Comment Patient able to retrieve ADL objects room table, keeps right eye closed.  Reports by an eye patch 3 days ago however not currently worn.  -AV       Row " Name 10/30/23 1104          Range of Motion Comprehensive    General Range of Motion bilateral upper extremity ROM WFL  -AV     Comment, General Range of Motion AROM including opposition  -AV       Row Name 10/30/23 1104          Strength Comprehensive (MMT)    Comment, General Manual Muscle Testing (MMT) Assessment 5/5 bilateral upper extremities  -AV       Row Name 10/30/23 1104          Motor Skills    Motor Skills coordination;functional endurance  -AV     Coordination WFL  Right dominant  -AV     Functional Endurance Poor  -AV       Row Name 10/30/23 1104          Balance    Comment, Balance Impaired  -AV               User Key  (r) = Recorded By, (t) = Taken By, (c) = Cosigned By      Initials Name Provider Type    AV Armond Vitale OT Occupational Therapist                   Goals/Plan       Row Name 10/30/23 1106          Transfer Goal 1 (OT)    Activity/Assistive Device (Transfer Goal 1, OT) transfers, all;walker, rolling  -AV     [place] Level/Cues Needed (Transfer Goal 1, OT) modified independence  -AV     Time Frame (Transfer Goal 1, OT) long term goal (LTG);10 days  -AV       Row Name 10/30/23 1106          Toileting Goal 1 (OT)    Activity/Device (Toileting Goal 1, OT) toileting skills, all;commode, 3-in-1  -AV     [place] Level/Cues Needed (Toileting Goal 1, OT) modified independence  -AV     Time Frame (Toileting Goal 1, OT) long term goal (LTG);10 days  -AV       Row Name 10/30/23 1106          Grooming Goal 1 (OT)    Activity/Device (Grooming Goal 1, OT) grooming skills, all  -AV     [place] (Grooming Goal 1, OT) modified independence  Partial stand at sink  -AV     Time Frame (Grooming Goal 1, OT) long term goal (LTG);10 days  -AV       Row Name 10/30/23 1106          Problem Specific Goal 1 (OT)    Problem Specific Goal 1 (OT) Patient will demonstrate fair endurance/activity tolerance needed to support ADLs.  -AV     Time Frame (Problem Specific Goal 1, OT) long term goal (LTG);10  days  -AV       Row Name 10/30/23 1106          Therapy Assessment/Plan (OT)    Planned Therapy Interventions (OT) activity tolerance training;BADL retraining;functional balance retraining;occupation/activity based interventions;patient/caregiver education/training;ROM/therapeutic exercise;transfer/mobility retraining  -AV               User Key  (r) = Recorded By, (t) = Taken By, (c) = Cosigned By      Initials Name Provider Type    AV Armond Vitale, MONISHA Occupational Therapist                   Clinical Impression       Row Name 10/30/23 1105          Pain Assessment    Additional Documentation Pain Scale: FACES Pre/Post-Treatment (Group)  -AV       Row Name 10/30/23 1105          Pain Scale: FACES Pre/Post-Treatment    Pain: FACES Scale, Pretreatment 2-->hurts little bit  -AV     Posttreatment Pain Rating 2-->hurts little bit  -AV     Pain Location - back  -AV       Row Name 10/30/23 1105          Plan of Care Review    Plan of Care Reviewed With patient  -AV     Progress no change  First session for evaluation  -AV     Outcome Evaluation Patient presents with limitations of balance, endurance/activity tolerance, strength and visual perception which impede his ability to perform ADL and transfers as prior.  The skills of a therapist will be required to safely and effectively implement treatment plan to restore maximal level of function.  -AV       Row Name 10/30/23 1105          Therapy Assessment/Plan (OT)    Patient/Family Therapy Goal Statement (OT) None stated  -AV     Rehab Potential (OT) good, to achieve stated therapy goals  -AV     Criteria for Skilled Therapeutic Interventions Met (OT) yes;meets criteria;skilled treatment is necessary  -AV     Therapy Frequency (OT) 5 times/wk  -AV       Row Name 10/30/23 1105          Therapy Plan Review/Discharge Plan (OT)    Equipment Needs Upon Discharge (OT) walker, rolling;tub bench;commode chair  -AV     Anticipated Discharge Disposition (OT) sub acute care setting   -AV       Row Name 10/30/23 1105          Vital Signs    O2 Delivery Pre Treatment room air  -AV     O2 Delivery Intra Treatment room air  -AV     O2 Delivery Post Treatment room air  -AV       Row Name 10/30/23 1105          Positioning and Restraints    Pre-Treatment Position in bed  -AV     Post Treatment Position bed  High Fowlers  -AV     In Bed call light within reach;encouraged to call for assist  -AV               User Key  (r) = Recorded By, (t) = Taken By, (c) = Cosigned By      Initials Name Provider Type    Armond Ayers, MONISHA Occupational Therapist                   Outcome Measures       Row Name 10/30/23 1107          How much help from another is currently needed...    Putting on and taking off regular lower body clothing? 2  -AV     Bathing (including washing, rinsing, and drying) 2  -AV     Toileting (which includes using toilet bed pan or urinal) 1  -AV     Putting on and taking off regular upper body clothing 2  -AV     Taking care of personal grooming (such as brushing teeth) 2  -AV     Eating meals 3  -AV     AM-PAC 6 Clicks Score (OT) 12  -AV       Row Name 10/30/23 0830          How much help from another person do you currently need...    Turning from your back to your side while in flat bed without using bedrails? 3  -AT     Moving from lying on back to sitting on the side of a flat bed without bedrails? 3  -AT     Moving to and from a bed to a chair (including a wheelchair)? 2  -AT     Standing up from a chair using your arms (e.g., wheelchair, bedside chair)? 2  -AT     Climbing 3-5 steps with a railing? 1  -AT     To walk in hospital room? 2  -AT     AM-PAC 6 Clicks Score (PT) 13  -AT     Highest level of mobility 4 --> Transferred to chair/commode  -AT       Row Name 10/30/23 1107          Functional Assessment    Outcome Measure Options AM-PAC 6 Clicks Daily Activity (OT);Optimal Instrument  -AV       Row Name 10/30/23 1107          Optimal Instrument    Optimal Instrument Optimal -  3  -AV     Bending/Stooping 5  -AV     Standing 5  -AV     Reaching 1  -AV     From the list, choose the 3 activities you would most like to be able to do without any difficulty Bending/stooping;Standing;Reaching  -AV     Total Score Optimal - 3 11  -AV               User Key  (r) = Recorded By, (t) = Taken By, (c) = Cosigned By      Initials Name Provider Type    AV Armond Vitale, MONISHA Occupational Therapist    [place] Registered Nurse                    Occupational Therapy Education       Title: PT OT SLP Therapies (In Progress)       Topic: Occupational Therapy (Not Started)       Point: ADL training (Not Started)       Description:   Instruct learner(s) on proper safety adaptation and remediation techniques during self care or transfers.   Instruct in proper use of assistive devices.                  Learner Progress:  Not documented in this visit.              Point: Home exercise program (Not Started)       Description:   Instruct learner(s) on appropriate technique for monitoring, assisting and/or progressing therapeutic exercises/activities.                  Learner Progress:  Not documented in this visit.              Point: Precautions (Not Started)       Description:   Instruct learner(s) on prescribed precautions during self-care and functional transfers.                  Learner Progress:  Not documented in this visit.              Point: Body mechanics (Not Started)       Description:   Instruct learner(s) on proper positioning and spine alignment during self-care, functional mobility activities and/or exercises.                  Learner Progress:  Not documented in this visit.                                  OT Recommendation and Plan  Planned Therapy Interventions (OT): activity tolerance training, BADL retraining, functional balance retraining, occupation/activity based interventions, patient/caregiver education/training, ROM/therapeutic exercise, transfer/mobility retraining  Therapy  Frequency (OT): 5 times/wk  Plan of Care Review  Plan of Care Reviewed With: patient  Progress: no change (First session for evaluation)  Outcome Evaluation: Patient presents with limitations of balance, endurance/activity tolerance, strength and visual perception which impede his ability to perform ADL and transfers as prior.  The skills of a therapist will be required to safely and effectively implement treatment plan to restore maximal level of function.     Time Calculation:   Evaluation Complexity (OT)  Review Occupational Profile/Medical/Therapy History Complexity: expanded/moderate complexity  Assessment, Occupational Performance/Identification of Deficit Complexity: 3-5 performance deficits  Clinical Decision Making Complexity (OT): detailed assessment/moderate complexity  Overall Complexity of Evaluation (OT): moderate complexity     Time Calculation- OT       Row Name 10/30/23 1109             Time Calculation- OT    OT Received On 10/30/23  -AV      OT Goal Re-Cert Due Date 11/08/23  -AV         Untimed Charges    OT Eval/Re-eval Minutes 35  -AV         Total Minutes    Untimed Charges Total Minutes 35  -AV       Total Minutes 35  -AV                User Key  (r) = Recorded By, (t) = Taken By, (c) = Cosigned By      Initials Name Provider Type    AV Armond Vitale OT Occupational Therapist                  Therapy Charges for Today       Code Description Service Date Service Provider Modifiers Qty    10938429461 HC OT EVAL MOD COMPLEXITY 3 10/30/2023 Armond Vitale OT GO 1                 Armond Vitale OT  10/30/2023

## 2023-10-30 NOTE — DISCHARGE INSTR - LAB
Shai Azar PA  Office will call patient at home number to set up an appointment     Dr. Valerio   Office will call patient at home number to set up an appointment

## 2023-10-30 NOTE — PROGRESS NOTES
[facility]   Hospitalist Progress Note  Date: 10/30/2023  Patient Name: Niall Mak  : 1960  MRN: [id number]  Date of admission: 10/29/2023      Subjective   Subjective     Chief Complaint: Double vision    Summary:  63 y.o. male PMH morbid obesity, CHF, type 2 diabetes mellitus, hypertension, A-fib on Eliquis, who presented to the ED on 10/29 due to complaints of persistent horizontal diplopia.  He states his symptoms started this past Friday and has been persistent. He has been using an eye patch and closing 1 eye at a time to help with the symptoms.  Has had difficulty with ambulation and unsteadiness.  He does report a recent viral infection, he has had diarrhea, feeling achy, fatigue, myalgias.  No upper respiratory symptoms.  MRI was obtained in the ED which was negative for acute CVA.  However, the patient was unable to ambulate safely in the ED. He was admitted for further care, myasthenia gravis profile has been obtained.  Teleneurology was consulted and started the patient on a trial of Mestinon.  PT/OT consulted.  Likely home with appropriate DME in 1-2 days.    Interval Followup: No acute events overnight.  He did not sleep well last night and was exhausted.  Double vision largely unchanged.  He met with teleneurology this morning and is amenable to a trial of Mestinon.  Myasthenia gravis panel pending.    Objective   Objective     Vitals:   Temp:  [97.2 °F (36.2 °C)-98.1 °F (36.7 °C)] 97.2 °F (36.2 °C)  Heart Rate:  [50-95] 78  Resp:  [17-20] 20  BP: (126-164)/(70-88) 164/87  Physical Exam    Constitutional: Obese male, awake, alert, no acute distress   Respiratory: Clear to auscultation bilaterally, double vision noted, nonlabored respirations    Cardiovascular: RRR, no MRG   Gastrointestinal: Positive bowel sounds, soft, nontender, nondistended   Neurologic: Oriented x 3, strength symmetric in all extremities, Cranial Nerves grossly intact to confrontation, speech clear                EXT: Significant lymphedema noted lower extremities, dry crusting skin of abdomen noted    Result Review    Result Review:  I have personally reviewed the results below:  [x]  Laboratory personally reviewed BMP, CBC, blood sugars, lipid panel, TSH, B12  []  Microbiology  []  Radiology  [x]  EKG/Telemetry telemetry reviewed showing normal sinus rhythm  []  Cardiology/Vascular   []  Pathology  []  Old records  []  Other:  CBC          9/28/2023    14:36 10/29/2023    11:37 10/30/2023    05:12   CBC   WBC 8.24     8.95  9.23    RBC 4.48     4.49  4.41    Hemoglobin 13.4     13.2  13.0    Hematocrit 41.4     41.6  40.5    MCV 92.4     92.7  91.8    MCH 29.9     29.4  29.5    MCHC 32.4     31.7  32.1    RDW 15.9     15.5  15.3    Platelets 289     268  297       Details          This result is from an external source.             CMP          7/10/2023    05:13 10/29/2023    11:37 10/30/2023    05:12   CMP   Glucose 266  233  251    BUN 18  14  11    Creatinine 0.91  0.85  0.68    EGFR 94.7  97.6  104.4    Sodium 133  138  138    Potassium 4.0  3.9  3.7    Chloride 96  100  101    Calcium 9.3  9.5  9.3    Total Protein  7.6     Albumin 3.3  3.8     Globulin  3.8     Total Bilirubin  0.4     Alkaline Phosphatase  54     AST (SGOT)  11     ALT (SGPT)  12     Albumin/Globulin Ratio  1.0     BUN/Creatinine Ratio 19.8  16.5  16.2    Anion Gap 14.0  8.6  9.0        Assessment & Plan   Assessment / Plan     Assessment/Plan:  Horizontal diplopia, concern for cranial nerve VI palsy versus myasthenia gravis  Type 2 diabetes mellitus A1c 8.8  Paroxysmal atrial fibrillation on Eliquis  Hypertension  Chronic diastolic congestive heart failure, not currently in acute exacerbation  Super morbid obesity     Continue to monitor in the hospital for work-up and management of the above  Neurology consulted, appreciate assistance  Lyme antibody pending, acetylcholine receptor antibody and myasthenia gravis panel pending  Lipid panel, TSH and  B12 levels within normal limits  Recommend wearing eye patch daily, alternating eyes every day  Start a trial of Mestinon 30 mg 3 times daily.  This can be titrated up on an outpatient basis by neurology  Continue home metoprolol, gabapentin, digoxin, Eliquis, allopurinol  Consult PT/OT/case management consulted for appropriate DME and disposition  Trend renal function and electrolytes with a.m. BMP, magnesium   Trend Hgb and WBC with a.m. CBC     Discussed case with: Bedside RN, teleneurology    DVT prophylaxis:  Medical DVT prophylaxis orders are present.    CODE STATUS:   Level Of Support Discussed With: Patient  Code Status (Patient has no pulse and is not breathing): CPR (Attempt to Resuscitate)  Medical Interventions (Patient has pulse or is breathing): Full Support    Electronically signed by Vinicio Christy MD, 10/30/23, 1:08 PM EDT.

## 2023-10-31 NOTE — THERAPY DISCHARGE NOTE
Acute Care - Occupational Therapy Discharge  [facility]    Patient Name: Niall Mak  : 1960    MRN: [id number]                              Today's Date: 10/31/2023       Admit Date: 10/29/2023    Visit Dx:     ICD-10-CM ICD-9-CM   1. Right abducens nerve palsy  H49.21 378.54   2. Unstable gait  R26.81 781.2   3. Decreased activities of daily living (ADL)  Z78.9 V49.89   4. 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                  Mobility/ADL's    No documentation.                  Obj/Interventions    No documentation.                  Goals/Plan       Row Name 10/31/23 1051          Transfer Goal 1 (OT)    Activity/Assistive Device (Transfer Goal 1, OT) transfers, all;walker, rolling  -AV     [place] Level/Cues Needed (Transfer Goal 1, OT) modified independence  -AV     Time Frame (Transfer Goal 1, OT) long term goal (LTG);10 days  -AV     Progress/Outcome (Transfer Goal 1, OT) discharged from facility  -AV       Row Name 10/31/23 1051          Toileting Goal 1 (OT)    Activity/Device (Toileting Goal 1, OT) toileting skills, all;commode, 3-in-1  -AV     [place] Level/Cues Needed (Toileting Goal 1, OT) modified independence  -AV     Time Frame (Toileting Goal 1, OT) long term goal (LTG);10 days  -AV     Progress/Outcome (Toileting Goal 1, OT) discharged from facility  -AV       Row Name 10/31/23 1051          Grooming Goal 1 (OT)    Activity/Device (Grooming Goal 1, OT)  grooming skills, all  -AV     [place] (Grooming Goal 1, OT) modified independence  -AV     Time Frame (Grooming Goal 1, OT) long term goal (LTG);10 days  -AV     Progress/Outcome (Grooming Goal 1, OT) discharged from facility  -AV       Row Name 10/31/23 1051          Problem Specific Goal 1 (OT)    Problem Specific Goal 1 (OT) Patient will demonstrate fair endurance/activity tolerance needed to support ADLs.  -AV     Time Frame (Problem Specific Goal 1, OT) long term goal (LTG);10 days  -AV     Progress/Outcome (Problem Specific Goal 1, OT) discharged from facility  -AV               User Key  (r) = Recorded By, (t) = Taken By, (c) = Cosigned By      Initials Name Provider Type    Armond Ayers OT Occupational Therapist                   Clinical Impression    No documentation.                  Outcome Measures    No documentation.                 Occupational Therapy Education       Title: PT OT SLP Therapies (Resolved)       Topic: Occupational Therapy (Resolved)       Point: ADL training (Resolved)       Description:   Instruct learner(s) on proper safety adaptation and remediation techniques during self care or transfers.   Instruct in proper use of assistive devices.                  Learner Progress:  Not documented in this visit.              Point: Home exercise program (Resolved)       Description:   Instruct learner(s) on appropriate technique for monitoring, assisting and/or progressing therapeutic exercises/activities.                  Learner Progress:  Not documented in this visit.              Point: Precautions (Resolved)       Description:   Instruct learner(s) on prescribed precautions during self-care and functional transfers.                  Learner Progress:  Not documented in this visit.              Point: Body mechanics (Resolved)       Description:   Instruct learner(s) on proper positioning and spine alignment during self-care, functional mobility activities and/or exercises.                   Learner Progress:  Not documented in this visit.                                  OT Recommendation and Plan  Planned Therapy Interventions (OT): activity tolerance training, BADL retraining, functional balance retraining, occupation/activity based interventions, patient/caregiver education/training, ROM/therapeutic exercise, transfer/mobility retraining  Therapy Frequency (OT): 5 times/wk  Plan of Care Review  Plan of Care Reviewed With: patient  Progress: no change (First session for evaluation)  Outcome Evaluation: Patient presents with limitations of balance, endurance/activity tolerance, strength and visual perception which impede his ability to perform ADL and transfers as prior.  The skills of a therapist will be required to safely and effectively implement treatment plan to restore maximal level of function.  Plan of Care Reviewed With: patient  Outcome Evaluation: Patient presents with limitations of balance, endurance/activity tolerance, strength and visual perception which impede his ability to perform ADL and transfers as prior.  The skills of a therapist will be required to safely and effectively implement treatment plan to restore maximal level of function.     Time Calculation:   Evaluation Complexity (OT)  Review Occupational Profile/Medical/Therapy History Complexity: expanded/moderate complexity  Assessment, Occupational Performance/Identification of Deficit Complexity: 3-5 performance deficits  Clinical Decision Making Complexity (OT): detailed assessment/moderate complexity  Overall Complexity of Evaluation (OT): moderate complexity      Therapy Charges for Today       Code Description Service Date Service Provider Modifiers Qty    [id number]  OT EVAL MOD COMPLEXITY 3 10/30/2023 Armond Vitale OT GO 1               OT Discharge Summary  Anticipated Discharge Disposition (OT): sub acute care setting  Reason for Discharge: Discharge from facility    Armond Vitale OT  10/31/2023

## 2023-11-01 ENCOUNTER — HOSPITAL ENCOUNTER (INPATIENT)
Facility: HOSPITAL | Age: 63
LOS: 5 days | Discharge: NURSING, REHAB OR LONG-TERM CARE | End: 2023-11-08
Attending: EMERGENCY MEDICINE | Admitting: FAMILY MEDICINE
Payer: MEDICARE

## 2023-11-01 ENCOUNTER — APPOINTMENT (OUTPATIENT)
Dept: GENERAL RADIOLOGY | Facility: HOSPITAL | Age: 63
End: 2023-11-01
Payer: MEDICARE

## 2023-11-01 DIAGNOSIS — Z78.9 IMPAIRED MOBILITY AND ADLS: ICD-10-CM

## 2023-11-01 DIAGNOSIS — R26.2 DIFFICULTY WALKING: ICD-10-CM

## 2023-11-01 DIAGNOSIS — R53.1 GENERALIZED WEAKNESS: Primary | ICD-10-CM

## 2023-11-01 DIAGNOSIS — Z74.09 IMPAIRED MOBILITY AND ADLS: ICD-10-CM

## 2023-11-01 LAB
027 TOXIN: NORMAL
ALBUMIN SERPL-MCNC: 3.7 G/DL (ref 3.5–5.2)
ALBUMIN/GLOB SERPL: 0.9 G/DL
ALP SERPL-CCNC: 57 U/L (ref 39–117)
ALT SERPL W P-5'-P-CCNC: 14 U/L (ref 1–41)
ANION GAP SERPL CALCULATED.3IONS-SCNC: 10.9 MMOL/L (ref 5–15)
AST SERPL-CCNC: 18 U/L (ref 1–40)
B BURGDOR IGG+IGM SER QL IA: NEGATIVE
BACTERIA UR QL AUTO: ABNORMAL /HPF
BASOPHILS # BLD AUTO: 0.04 10*3/MM3 (ref 0–0.2)
BASOPHILS NFR BLD AUTO: 0.4 % (ref 0–1.5)
BILIRUB SERPL-MCNC: 0.5 MG/DL (ref 0–1.2)
BILIRUB UR QL STRIP: NEGATIVE
BUN SERPL-MCNC: 10 MG/DL (ref 8–23)
BUN/CREAT SERPL: 11.5 (ref 7–25)
C DIFF TOX GENS STL QL NAA+PROBE: NEGATIVE
CALCIUM SPEC-SCNC: 9.9 MG/DL (ref 8.6–10.5)
CHLORIDE SERPL-SCNC: 101 MMOL/L (ref 98–107)
CLARITY UR: ABNORMAL
CO2 SERPL-SCNC: 28.1 MMOL/L (ref 22–29)
COLOR UR: ABNORMAL
CREAT SERPL-MCNC: 0.87 MG/DL (ref 0.76–1.27)
DEPRECATED RDW RBC AUTO: 50.3 FL (ref 37–54)
DIGOXIN SERPL-MCNC: 0.65 NG/ML (ref 0.6–1.2)
EGFRCR SERPLBLD CKD-EPI 2021: 97 ML/MIN/1.73
EOSINOPHIL # BLD AUTO: 0.13 10*3/MM3 (ref 0–0.4)
EOSINOPHIL NFR BLD AUTO: 1.3 % (ref 0.3–6.2)
ERYTHROCYTE [DISTWIDTH] IN BLOOD BY AUTOMATED COUNT: 15.3 % (ref 12.3–15.4)
GLOBULIN UR ELPH-MCNC: 4.1 GM/DL
GLUCOSE SERPL-MCNC: 256 MG/DL (ref 65–99)
GLUCOSE UR STRIP-MCNC: ABNORMAL MG/DL
HCT VFR BLD AUTO: 44.1 % (ref 37.5–51)
HGB BLD-MCNC: 14.2 G/DL (ref 13–17.7)
HGB UR QL STRIP.AUTO: NEGATIVE
HYALINE CASTS UR QL AUTO: ABNORMAL /LPF
IMM GRANULOCYTES # BLD AUTO: 0.06 10*3/MM3 (ref 0–0.05)
IMM GRANULOCYTES NFR BLD AUTO: 0.6 % (ref 0–0.5)
KETONES UR QL STRIP: ABNORMAL
LEUKOCYTE ESTERASE UR QL STRIP.AUTO: ABNORMAL
LIPASE SERPL-CCNC: 26 U/L (ref 13–60)
LYMPHOCYTES # BLD AUTO: 1.15 10*3/MM3 (ref 0.7–3.1)
LYMPHOCYTES NFR BLD AUTO: 11.9 % (ref 19.6–45.3)
MCH RBC QN AUTO: 29.3 PG (ref 26.6–33)
MCHC RBC AUTO-ENTMCNC: 32.2 G/DL (ref 31.5–35.7)
MCV RBC AUTO: 90.9 FL (ref 79–97)
MONOCYTES # BLD AUTO: 0.7 10*3/MM3 (ref 0.1–0.9)
MONOCYTES NFR BLD AUTO: 7.2 % (ref 5–12)
NEUTROPHILS NFR BLD AUTO: 7.6 10*3/MM3 (ref 1.7–7)
NEUTROPHILS NFR BLD AUTO: 78.6 % (ref 42.7–76)
NITRITE UR QL STRIP: POSITIVE
NRBC BLD AUTO-RTO: 0 /100 WBC (ref 0–0.2)
PH UR STRIP.AUTO: 7 [PH] (ref 5–8)
PLATELET # BLD AUTO: 315 10*3/MM3 (ref 140–450)
PMV BLD AUTO: 10.2 FL (ref 6–12)
POTASSIUM SERPL-SCNC: 4.2 MMOL/L (ref 3.5–5.2)
PROT SERPL-MCNC: 7.8 G/DL (ref 6–8.5)
PROT UR QL STRIP: ABNORMAL
RBC # BLD AUTO: 4.85 10*6/MM3 (ref 4.14–5.8)
RBC # UR STRIP: ABNORMAL /HPF
REF LAB TEST METHOD: ABNORMAL
SODIUM SERPL-SCNC: 140 MMOL/L (ref 136–145)
SP GR UR STRIP: 1.02 (ref 1–1.03)
SQUAMOUS #/AREA URNS HPF: ABNORMAL /HPF
UROBILINOGEN UR QL STRIP: ABNORMAL
WBC # UR STRIP: ABNORMAL /HPF
WBC NRBC COR # BLD: 9.68 10*3/MM3 (ref 3.4–10.8)

## 2023-11-01 PROCEDURE — 80162 ASSAY OF DIGOXIN TOTAL: CPT | Performed by: EMERGENCY MEDICINE

## 2023-11-01 PROCEDURE — 25810000003 SODIUM CHLORIDE 0.9 % SOLUTION: Performed by: EMERGENCY MEDICINE

## 2023-11-01 PROCEDURE — 81001 URINALYSIS AUTO W/SCOPE: CPT | Performed by: EMERGENCY MEDICINE

## 2023-11-01 PROCEDURE — 87077 CULTURE AEROBIC IDENTIFY: CPT | Performed by: EMERGENCY MEDICINE

## 2023-11-01 PROCEDURE — 87186 SC STD MICRODIL/AGAR DIL: CPT | Performed by: EMERGENCY MEDICINE

## 2023-11-01 PROCEDURE — G0378 HOSPITAL OBSERVATION PER HR: HCPCS

## 2023-11-01 PROCEDURE — 36415 COLL VENOUS BLD VENIPUNCTURE: CPT

## 2023-11-01 PROCEDURE — 99285 EMERGENCY DEPT VISIT HI MDM: CPT

## 2023-11-01 PROCEDURE — 71045 X-RAY EXAM CHEST 1 VIEW: CPT

## 2023-11-01 PROCEDURE — 99222 1ST HOSP IP/OBS MODERATE 55: CPT | Performed by: FAMILY MEDICINE

## 2023-11-01 PROCEDURE — 87040 BLOOD CULTURE FOR BACTERIA: CPT | Performed by: FAMILY MEDICINE

## 2023-11-01 PROCEDURE — 80053 COMPREHEN METABOLIC PANEL: CPT | Performed by: EMERGENCY MEDICINE

## 2023-11-01 PROCEDURE — 85025 COMPLETE CBC W/AUTO DIFF WBC: CPT | Performed by: EMERGENCY MEDICINE

## 2023-11-01 PROCEDURE — 87505 NFCT AGENT DETECTION GI: CPT | Performed by: EMERGENCY MEDICINE

## 2023-11-01 PROCEDURE — 83605 ASSAY OF LACTIC ACID: CPT | Performed by: FAMILY MEDICINE

## 2023-11-01 PROCEDURE — 87086 URINE CULTURE/COLONY COUNT: CPT | Performed by: EMERGENCY MEDICINE

## 2023-11-01 PROCEDURE — 83690 ASSAY OF LIPASE: CPT | Performed by: EMERGENCY MEDICINE

## 2023-11-01 PROCEDURE — 87493 C DIFF AMPLIFIED PROBE: CPT | Performed by: EMERGENCY MEDICINE

## 2023-11-01 RX ORDER — FUROSEMIDE 40 MG/1
40 TABLET ORAL DAILY
Status: DISCONTINUED | OUTPATIENT
Start: 2023-11-02 | End: 2023-11-08 | Stop reason: HOSPADM

## 2023-11-01 RX ORDER — BISACODYL 10 MG
10 SUPPOSITORY, RECTAL RECTAL DAILY PRN
Status: DISCONTINUED | OUTPATIENT
Start: 2023-11-01 | End: 2023-11-08 | Stop reason: HOSPADM

## 2023-11-01 RX ORDER — METOPROLOL SUCCINATE 50 MG/1
100 TABLET, EXTENDED RELEASE ORAL DAILY
Status: DISCONTINUED | OUTPATIENT
Start: 2023-11-02 | End: 2023-11-08 | Stop reason: HOSPADM

## 2023-11-01 RX ORDER — DIGOXIN 250 MCG
250 TABLET ORAL DAILY
Status: DISCONTINUED | OUTPATIENT
Start: 2023-11-02 | End: 2023-11-08 | Stop reason: HOSPADM

## 2023-11-01 RX ORDER — ACETAMINOPHEN 325 MG/1
650 TABLET ORAL EVERY 4 HOURS PRN
Status: DISCONTINUED | OUTPATIENT
Start: 2023-11-01 | End: 2023-11-08 | Stop reason: HOSPADM

## 2023-11-01 RX ORDER — ACETAMINOPHEN 650 MG/1
650 SUPPOSITORY RECTAL EVERY 4 HOURS PRN
Status: DISCONTINUED | OUTPATIENT
Start: 2023-11-01 | End: 2023-11-08 | Stop reason: HOSPADM

## 2023-11-01 RX ORDER — PYRIDOSTIGMINE BROMIDE 60 MG/1
30 TABLET ORAL 3 TIMES DAILY
Status: DISCONTINUED | OUTPATIENT
Start: 2023-11-01 | End: 2023-11-08 | Stop reason: HOSPADM

## 2023-11-01 RX ORDER — SODIUM CHLORIDE 9 MG/ML
40 INJECTION, SOLUTION INTRAVENOUS AS NEEDED
Status: DISCONTINUED | OUTPATIENT
Start: 2023-11-01 | End: 2023-11-08 | Stop reason: HOSPADM

## 2023-11-01 RX ORDER — AMOXICILLIN 250 MG
2 CAPSULE ORAL 2 TIMES DAILY
Status: DISCONTINUED | OUTPATIENT
Start: 2023-11-01 | End: 2023-11-08 | Stop reason: HOSPADM

## 2023-11-01 RX ORDER — GABAPENTIN 300 MG/1
300 CAPSULE ORAL 3 TIMES DAILY
Status: DISCONTINUED | OUTPATIENT
Start: 2023-11-01 | End: 2023-11-08 | Stop reason: HOSPADM

## 2023-11-01 RX ORDER — HYDROCODONE BITARTRATE AND ACETAMINOPHEN 5; 325 MG/1; MG/1
1 TABLET ORAL EVERY 12 HOURS PRN
Status: DISCONTINUED | OUTPATIENT
Start: 2023-11-01 | End: 2023-11-08 | Stop reason: HOSPADM

## 2023-11-01 RX ORDER — POLYETHYLENE GLYCOL 3350 17 G/17G
17 POWDER, FOR SOLUTION ORAL DAILY PRN
Status: DISCONTINUED | OUTPATIENT
Start: 2023-11-01 | End: 2023-11-08 | Stop reason: HOSPADM

## 2023-11-01 RX ORDER — FLUTICASONE PROPIONATE 50 MCG
1 SPRAY, SUSPENSION (ML) NASAL DAILY
Status: DISCONTINUED | OUTPATIENT
Start: 2023-11-02 | End: 2023-11-08 | Stop reason: HOSPADM

## 2023-11-01 RX ORDER — ALLOPURINOL 300 MG/1
300 TABLET ORAL DAILY
Status: DISCONTINUED | OUTPATIENT
Start: 2023-11-02 | End: 2023-11-08 | Stop reason: HOSPADM

## 2023-11-01 RX ORDER — GABAPENTIN 600 MG/1
600 TABLET ORAL 3 TIMES DAILY
COMMUNITY

## 2023-11-01 RX ORDER — NICOTINE 21 MG/24HR
1 PATCH, TRANSDERMAL 24 HOURS TRANSDERMAL EVERY 24 HOURS
Status: DISCONTINUED | OUTPATIENT
Start: 2023-11-01 | End: 2023-11-01

## 2023-11-01 RX ORDER — POTASSIUM CHLORIDE 750 MG/1
10 CAPSULE, EXTENDED RELEASE ORAL ONCE
Status: COMPLETED | OUTPATIENT
Start: 2023-11-01 | End: 2023-11-02

## 2023-11-01 RX ORDER — BISACODYL 5 MG/1
5 TABLET, DELAYED RELEASE ORAL DAILY PRN
Status: DISCONTINUED | OUTPATIENT
Start: 2023-11-01 | End: 2023-11-08 | Stop reason: HOSPADM

## 2023-11-01 RX ORDER — SODIUM CHLORIDE 0.9 % (FLUSH) 0.9 %
10 SYRINGE (ML) INJECTION EVERY 12 HOURS SCHEDULED
Status: DISCONTINUED | OUTPATIENT
Start: 2023-11-01 | End: 2023-11-08 | Stop reason: HOSPADM

## 2023-11-01 RX ORDER — ACETAMINOPHEN 160 MG/5ML
650 SOLUTION ORAL EVERY 4 HOURS PRN
Status: DISCONTINUED | OUTPATIENT
Start: 2023-11-01 | End: 2023-11-08 | Stop reason: HOSPADM

## 2023-11-01 RX ORDER — ONDANSETRON 2 MG/ML
4 INJECTION INTRAMUSCULAR; INTRAVENOUS EVERY 6 HOURS PRN
Status: DISCONTINUED | OUTPATIENT
Start: 2023-11-01 | End: 2023-11-08 | Stop reason: HOSPADM

## 2023-11-01 RX ORDER — SODIUM CHLORIDE 0.9 % (FLUSH) 0.9 %
10 SYRINGE (ML) INJECTION AS NEEDED
Status: DISCONTINUED | OUTPATIENT
Start: 2023-11-01 | End: 2023-11-08 | Stop reason: HOSPADM

## 2023-11-01 RX ORDER — CHOLECALCIFEROL (VITAMIN D3) 125 MCG
5 CAPSULE ORAL NIGHTLY PRN
Status: DISCONTINUED | OUTPATIENT
Start: 2023-11-01 | End: 2023-11-08 | Stop reason: HOSPADM

## 2023-11-01 RX ORDER — GABAPENTIN 300 MG/1
600 CAPSULE ORAL EVERY 8 HOURS SCHEDULED
Status: DISCONTINUED | OUTPATIENT
Start: 2023-11-01 | End: 2023-11-08 | Stop reason: HOSPADM

## 2023-11-01 RX ORDER — DILTIAZEM HYDROCHLORIDE 240 MG/1
240 CAPSULE, COATED, EXTENDED RELEASE ORAL DAILY
Status: DISCONTINUED | OUTPATIENT
Start: 2023-11-02 | End: 2023-11-08 | Stop reason: HOSPADM

## 2023-11-01 RX ADMIN — SODIUM CHLORIDE 1000 ML: 9 INJECTION, SOLUTION INTRAVENOUS at 15:47

## 2023-11-01 NOTE — H&P
[facility]  [facility]   Patient Name: Niall Mak  : 1960  MRN: [id number]  Primary Care Physician:  Shai Azar PA  Date of admission: 2023    Subjective   Subjective     Chief Complaint: weakness    HPI:    Niall Mak is a 63 y.o. male with severe morbid obesity presents with functional paraplegia due to morbid obesity.  He was recently discharged home from the hospital about 3 days ago.  At that time rehab was recommended but he refused.  Since going home he has not gotten up out of bed.  He was brought back to the ED due to inability to ambulate or get out of bed. In the ED he has no acute findings but is unable to ambulate.        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       Past Surgical History:  Past Surgical History:   Procedure Laterality Date    COLONOSCOPY      TONSILLECTOMY         Family History:   Family History   Problem Relation Age of Onset    Cancer Father        Social History:   Social History     Socioeconomic History    Marital status:    Tobacco Use    Smoking status: Never    Smokeless tobacco: Never   Vaping Use    Vaping Use: Never used   Substance and Sexual Activity    Alcohol use: Not Currently    Drug use: Never    Sexual activity: Not Currently     Partners: Female       Home Medications:  Cetirizine HCl, Cinnamon, HYDROcodone-acetaminophen, Turmeric, albuterol sulfate HFA, allopurinol, apixaban, digoxin, dilTIAZem CD, fluticasone, furosemide, gabapentin, glimepiride, metFORMIN, metoprolol succinate XL, potassium chloride, and pyridostigmine    Allergies:  Allergies   Allergen Reactions    Cucumber Extract Unknown - High  Severity and Other (See Comments)    Influenza Virus Vaccine Unknown - High Severity    Sitagliptin Unknown - High Severity    Bridgeton Unknown - High Severity    Tomato Unknown - High Severity and Other (See Comments)       Review of Systems   All systems were reviewed and negative except for: noted above or in HPI    Objective   Objective     Vitals:   Temp:  [98.8 °F (37.1 °C)] 98.8 °F (37.1 °C)  Heart Rate:  [79-87] 79  Resp:  [16] 16  BP: (165)/(95) 165/95    Physical Exam    Constitutional: sleeping but awakens to answer questions briefly, no acute distress   Respiratory: diminished to auscultation bilaterally, nonlabored respirations, exam limited by body habitus   Cardiovascular: IRIR, no murmurs, rubs, or gallops, palpable pedal pulses bilaterally   Gastrointestinal: Positive bowel sounds, soft, nontender, nondistended   Psychiatric: Appropriate affect, cooperative   Neurologic: Oriented x 3, speech clear   Skin: No rashes           Assessment & Plan   Assessment / Plan     Assessment/Plan:   Functional paraplegia due to deconditioning and morbid obesity  UTI  Extreme morbid obesity with a BMI of 55.89  Persistent A-fib,   Controlled CHF (congestive heart failure)  Coronary artery disease - no ACS,   Diabetes mellitus  Neuropathy in diabetes      Admit to observation status  Rocephin for nitrite positive UTI  Resume home meds  PT/OT   consult  Monitor vital signs  Strict I&Os  Further recs per hospital course    DVT prophylaxis:  Medical and mechanical DVT prophylaxis orders are present.    CODE STATUS:    Code Status (Patient has no pulse and is not breathing): CPR (Attempt to Resuscitate)  Medical Interventions (Patient has pulse or is breathing): Full Support      Admission Status:  I believe this patient meets observation status.    Electronically signed by Manuelito Garcia DO, 11/01/23, 3:51 PM EDT.

## 2023-11-01 NOTE — PAYOR COMM NOTE
SUBJECT:     HOSPITAL TO  HOME  -  AMBULANCE TRANSPORTATION AUTHORIZATION REQUEST      PATIENT'S NAME:     Niall SANCHEZ [facility] MRN:     [id number]     DOS:     10/30/2023    INSURANCE MEMBER ID:     [id number]        REQUESTING PROVIDER  Provider/NPI       Address            [address] [place] [address]  Phone               [phone]  Fax                    [phone]        SERVICE / RENDERING PROVIDER  Company [facility]  NPI  [id number]   Tax ID  [id number]  Taxonomy        [id number] [employer]  [place] [address] [place] [address]  Phone  [phone]  Fax  [phone]         CASE MANAGEMENT CONTACT INFORMATION  Name  Primary contact:          Yadira Fonseca                           Secondary contact:     Lila Howard RN             Phone  [phone]  Fax  [phone]        SERVICING INFORMATION  Type of Service:      Medically Related Transportation    Place of Service:     [facility]ulance Land    Diagnosis #1   6th nerve palsy ICD-10-H49.20             Diagnosis #2   Z78.9 - Other specified health status             Diagnosis #3                Diagnosis #4                Diagnosis #5                  Dates of Service Service Codes Requested Service #  Units   Start Stop      10/30/23 10/30/23  Basic Life Support, Non-emergent 1      Advanced Life Support, Non-emergent, Level 1       Advanced Life Support, Level 2       Specialty Care Transport    10/30/23 10/30/23  Ground Mileage [address]      Ambulance oxygen, O2 supplies, life sustaining situation        Leaving From:     [facility], [address] [place] [address]    Destination:  [address] [facility] [address]              ++++++++++++++++++++++++++++++++++++++++++++++++++++++++++++++++++++++++++++++++++        ObdulioNiall (63 y.o. Male)       Date of Birth   1960    Social Security Number       Address  "  [address] [place] [address]    Home Phone   [phone]    MRN   [id number]       Restorationism   None    Marital Status                               Admission Date   10/29/23    Admission Type   Emergency    Admitting Provider   Vinicio Sepulveda MD    Attending Provider       Department, Room/Bed   [facility] 4TH FLOOR MEDICAL TELEMETRY UNIT, 416/1       Discharge Date   10/30/2023    Discharge Disposition   Home-Health Care Svc    Discharge Destination                                 Attending Provider: (none)   Allergies: Cucumber Extract, Influenza Virus Vaccine, Sitagliptin, Strawberry, Tomato    Isolation: None   Infection: None   Code Status: Prior    Ht: 195.6 cm (77\")   Wt: 222 kg (490 lb 1.3 oz)    Admission Cmt: None   Principal Problem: 6th nerve palsy [H49.20]                   Active Insurance as of 10/29/2023       Primary Coverage       Payor Plan Insurance Group Employer/Plan Group    ANTHEM MEDICARE REPLACEMENT ANTHEM MEDICARE ADVANTAGE [id number]       Payor Plan Address Payor Plan Phone Number Payor Plan Fax Number Effective Dates    [address] [phone]  2023 - None Entered    [facility] [address]         Subscriber Name Subscriber Birth Date Member ID       NIALL JAIMES 1960 [id number]                     Emergency Contacts        (Rel.) Home Phone Work Phone Mobile Phone    Erika Jaimes (Spouse) [phone] -- --                       Discharge Summary        Vinicio Sepulveda MD at 10/30/23 1430                         [facility]         HOSPITALIST  DISCHARGE SUMMARY    Patient Name: Niall Jaimes  : 1960  MRN: [id number]    Date of Admission: 10/29/2023  Date of Discharge: 10/30/2023  Primary Care Physician: Shai Azar PA    Consults       Date and Time Order Name Status Description    10/29/2023  5:27 PM Inpatient Neurology Consult General      10/29/2023  5:05 PM Hospitalist (on-call MD unless " specified)      10/29/2023  1:24 PM Inpatient Neurology Consult General Completed             Active and Resolved Hospital Problems:  Active Hospital Problems    Diagnosis POA    **6th nerve palsy [H49.20] Yes      Resolved Hospital Problems   No resolved problems to display.   Horizontal diplopia, concern for cranial nerve VI palsy versus myasthenia gravis  Type 2 diabetes mellitus A1c 8.8  Paroxysmal atrial fibrillation on Eliquis  Hypertension  Chronic diastolic congestive heart failure, not currently in acute exacerbation  Super morbid obesity    Hospital Course     Hospital Course:  Niall Mak is a 63 y.o. male PMH morbid obesity, CHF, type 2 diabetes mellitus, hypertension, A-fib on Eliquis, who presented to the ED on 10/29 due to complaints of persistent horizontal diplopia.  He states his symptoms started this past Friday and has been persistent. He has been using an eye patch and closing 1 eye at a time to help with the symptoms.  Has had difficulty with ambulation and unsteadiness.  He does report a recent viral infection, he has had diarrhea, feeling achy, fatigue, myalgias.  No upper respiratory symptoms.  MRI was obtained in the ED which was negative for acute CVA.  However, the patient was unable to ambulate safely and cannot return home. He was admitted for further care, myasthenia gravis profile was sent and pending at the time of this note.  Teleneurology was consulted and started the patient on a trial of Mestinon.  PT/OT consulted.  Patient requested home health and did not want to be placed in rehab.  He is set up with appropriate DME and discharged home in stable condition on 10/30/2023.  Recommend follow-up with PCP within 1 week, recommend follow-up with neurology within 1 month.  He will continue to use eye patch as daily, alternating eyes.    DISCHARGE Follow Up Recommendations for labs and diagnostics: Follow-up myasthenia gravis profile and acetylcholine receptor antibody    Day of  Discharge     Vital Signs:  Temp:  [97.2 °F (36.2 °C)-98.1 °F (36.7 °C)] 97.2 °F (36.2 °C)  Heart Rate:  [50-95] 78  Resp:  [17-20] 20  BP: (137-164)/(70-87) 164/87  Physical Exam:              Constitutional: Obese male, awake, alert, no acute distress              Respiratory: Clear to auscultation bilaterally, nonlabored respirations               Cardiovascular: RRR, no MRG              Gastrointestinal: Positive bowel sounds, soft, nontender, nondistended              Neurologic: Oriented x 3, strength symmetric in all extremities, Cranial Nerves grossly intact to confrontation, speech clear               EXT: Significant lymphedema noted lower extremities, dry crusting skin of abdomen noted      Discharge Details        Discharge Medications        New Medications        Instructions Start Date   pyridostigmine 60 MG tablet  Commonly known as: MESTINON   30 mg, Oral, 3 Times Daily             Continue These Medications        Instructions Start Date   albuterol sulfate  (90 Base) MCG/ACT inhaler  Commonly known as: PROVENTIL HFA;VENTOLIN HFA;PROAIR HFA   2 puffs, Inhalation, Every 6 Hours PRN      allopurinol 300 MG tablet  Commonly known as: ZYLOPRIM   300 mg, Oral, Daily      Cinnamon 500 MG capsule   500 mg, Oral, 2 times daily      digoxin 250 MCG tablet  Commonly known as: LANOXIN   250 mcg, Oral, Daily      dilTIAZem  MG 24 hr capsule  Commonly known as: CARDIZEM CD   240 mg, Oral, Daily      Eliquis 5 MG tablet tablet  Generic drug: apixaban   1 tablet, Oral, 2 Times Daily      fluticasone 50 MCG/ACT nasal spray  Commonly known as: FLONASE   1 spray, Nasal, Daily      furosemide 40 MG tablet  Commonly known as: LASIX   1 tablet, Oral, Daily      gabapentin 600 MG tablet  Commonly known as: NEURONTIN   Take 1 tablet by mouth 3 (Three) Times a Day.      glimepiride 4 MG tablet  Commonly known as: AMARYL   4 mg, Oral, 2 Times Daily      HYDROcodone-acetaminophen 5-325 MG per tablet  Commonly  known as: NORCO   1 tablet, Oral, Every 12 Hours PRN      metFORMIN 500 MG tablet  Commonly known as: GLUCOPHAGE   1,000 mg, Oral, 2 Times Daily With Meals, Take 2 tablets twice a day      metoprolol succinate  MG 24 hr tablet  Commonly known as: TOPROL-XL   Take 1 tablet by mouth Daily.      potassium chloride 10 MEQ CR tablet   10 mEq, Oral, Daily      Turmeric 400 MG capsule   400 mg, Oral, Daily      ZyrTEC Allergy 10 MG capsule  Generic drug: Cetirizine HCl   10 mg, Oral, Daily               Allergies   Allergen Reactions    Cucumber Extract Unknown - High Severity and Other (See Comments)    Influenza Virus Vaccine Unknown - High Severity    Sitagliptin Unknown - High Severity    [place] Unknown - High Severity    Tomato Unknown - High Severity and Other (See Comments)       Discharge Disposition:  Home-Health Care [facility]    Diet:  Hospital:  Diet Order   Procedures    Diet: Diabetic Diets; Consistent Carbohydrate; Texture: Regular Texture (IDDSI 7); Fluid Consistency: Thin (IDDSI 0)       Discharge Activity:   Activity Instructions       Activity as Tolerated              CODE STATUS:  Code Status and Medical Interventions:   Ordered at: 10/29/23 0230     Level Of Support Discussed With:    Patient     Code Status (Patient has no pulse and is not breathing):    CPR (Attempt to Resuscitate)     Medical Interventions (Patient has pulse or is breathing):    Full Support         Future Appointments   Date Time Provider Department Center   11/6/2023  3:30 PM Des Ahumada DPM [facility] POD [facility]   11/10/2023  1:00 PM Sylwia Muhammad APRN [facility] DIAB ET [facility]       Additional Instructions for the Follow-ups that You Need to Schedule       Discharge Follow-up with PCP   As directed       Currently Documented PCP:    Shai Azar PA    PCP Phone Number:    [phone]     Follow Up Details: 3-5 days        Discharge Follow-up with Specified Provider: Neurology Dr. Valerio; 2 Weeks   As  directed      To: Neurology Dr. Valerio   Follow Up: 2 Weeks                Pertinent  and/or Most Recent Results     PROCEDURES:   None    LAB RESULTS:      Lab 10/30/23  0512 10/29/23  1137   WBC 9.23 8.95   HEMOGLOBIN 13.0 13.2   HEMATOCRIT 40.5 41.6   PLATELETS 297 268   NEUTROS ABS 7.44* 6.82   IMMATURE GRANS (ABS) 0.03 0.03   LYMPHS ABS 1.05 1.31   MONOS ABS 0.53 0.55   EOS ABS 0.15 0.20   MCV 91.8 92.7   PROTIME  --  14.1         Lab 10/30/23  0512 10/29/23  1137   SODIUM 138 138   POTASSIUM 3.7 3.9   CHLORIDE 101 100   CO2 28.0 29.4*   ANION GAP 9.0 8.6   BUN 11 14   CREATININE 0.68* 0.85   EGFR 104.4 97.6   GLUCOSE 251* 233*   CALCIUM 9.3 9.5   MAGNESIUM 1.9 1.7   TSH  --  2.390         Lab 10/29/23  1137   TOTAL PROTEIN 7.6   ALBUMIN 3.8   GLOBULIN 3.8   ALT (SGPT) 12   AST (SGOT) 11   BILIRUBIN 0.4   ALK PHOS 54         Lab 10/29/23  1137   PROTIME 14.1   INR 1.08         Lab 10/30/23  0512   CHOLESTEROL 140   LDL CHOL 93   HDL CHOL 27*   TRIGLYCERIDES 110         Lab 10/29/23  1137   VITAMIN B 12 328         Brief Urine Lab Results       None          Microbiology Results (last 10 days)       Procedure Component Value - Date/Time    Respiratory Panel PCR w/COVID-19(SARS-CoV-2) JAN/BEATRIZ/MICHAEL/PAD/COR/MAD/YARON In-House, NP Swab in UTM/VTM, 3-4 HR TAT - Swab, Nasopharynx [[id number]]  (Normal) Collected: 10/30/23 0153    Lab Status: Final result Specimen: Swab from Nasopharynx Updated: 10/30/23 0246     ADENOVIRUS, PCR Not Detected     Coronavirus 229E Not Detected     Coronavirus HKU1 Not Detected     Coronavirus NL63 Not Detected     Coronavirus OC43 Not Detected     COVID19 Not Detected     Human Metapneumovirus Not Detected     Human Rhinovirus/Enterovirus Not Detected     Influenza A PCR Not Detected     Influenza B PCR Not Detected     Parainfluenza Virus 1 Not Detected     Parainfluenza Virus 2 Not Detected     Parainfluenza Virus 3 Not Detected     Parainfluenza Virus 4 Not Detected     RSV, PCR Not  Detected     Bordetella pertussis pcr Not Detected     Bordetella parapertussis PCR Not Detected     Chlamydophila pneumoniae PCR Not Detected     Mycoplasma pneumo by PCR Not Detected    Narrative:      In the setting of a positive respiratory panel with a viral infection PLUS a negative procalcitonin without other underlying concern for bacterial infection, consider observing off antibiotics or discontinuation of antibiotics and continue supportive care. If the respiratory panel is positive for atypical bacterial infection (Bordetella pertussis, Chlamydophila pneumoniae, or Mycoplasma pneumoniae), consider antibiotic de-escalation to target atypical bacterial infection.            MRI Brain Without Contrast    Result Date: 10/29/2023    1. No evidence for acute focal ischemia. 2. No evidence for abnormal focal enhancement or abnormal enhancing mass. 3. Nonspecific white matter signal changes seen bilaterally likely related to chronic small vessel ischemic changes or age-related changes. Associated diffuse volume loss is observed.        MOE MURPHY MD       Electronically Signed and Approved By: MOE MURPHY MD on 10/29/2023 at 16:12             CT Head Without Contrast    Result Date: 10/29/2023    1. No evidence for acute intracranial abnormality.     MOE MURPHY MD       Electronically Signed and Approved By: MOE MURPHY MD on 10/29/2023 at 12:13                       Results for orders placed during the hospital encounter of 07/25/21    Adult Transthoracic Echo Complete W/ Cont if Necessary Per Protocol    Interpretation Summary  · Left ventricular wall thickness is consistent with mild posterior asymmetric hypertrophy.  · The right atrial cavity is mildly dilated.  · Left ventricular ejection fraction appears to be 56 - 60%.      Labs Pending at Discharge:  Pending Labs       Order Current Status    Acetylcholine Receptor Antibody Panel In process    Lyme Disease Total Antibody With Reflex to  Immunoassay In process    Myasthenia Gravis Profile In process              Time spent on Discharge including face to face service: 34 minutes    Electronically signed by Vinicio Christy MD, 10/30/23, 2:30 PM EDT.      Electronically signed by Vinicio Sepulveda MD at 10/30/23 7724

## 2023-11-01 NOTE — ED PROVIDER NOTES
Time: 2:54 PM EDT  Date of encounter:  11/1/2023  Independent Historian/Clinical History and Information was obtained by:   Patient    History is limited by: N/A    Chief Complaint: Weakness      History of Present Illness:  Patient is a 63 y.o. year old male who presents to the emergency department for evaluation of weakness.  Patient was recently discharged from the hospital and since has been home has been extremely weak has been unable to get out of bed.  During his hospital admission he declined rehab placement however now is stating he cannot do this at home.    HPI    Patient Care Team  Primary Care Provider: Shai Azar PA    Past Medical History:     Allergies   Allergen Reactions    Cucumber Extract Unknown - High Severity and Other (See Comments)    Influenza Virus Vaccine Unknown - High Severity    Sitagliptin Unknown - High Severity    [place] Unknown - High Severity    Tomato Unknown - High Severity and Other (See Comments)     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     Past Surgical History:   Procedure Laterality Date    COLONOSCOPY      TONSILLECTOMY       Family History   Problem Relation Age of Onset    Cancer Father        Home Medications:  Prior to Admission medications    Medication Sig Start Date End Date Taking? Authorizing Provider   albuterol sulfate  (90 Base) MCG/ACT inhaler Inhale 2 puffs Every 6 (Six) Hours As Needed. 12/13/22   Moiz Velasquez MD   allopurinol (ZYLOPRIM) 300 MG tablet Take 1 tablet by mouth Daily.    Moiz Velasquez MD   Cetirizine HCl (ZyrTEC Allergy) 10 MG capsule Take 10 mg by mouth Daily.    Moiz Velasquez MD   Cinnamon 500 MG capsule Take 1 capsule by mouth 2 (two) times a day.    Provider  MD Moiz   digoxin (LANOXIN) 250 MCG tablet Take 1 tablet by mouth Daily. 12/9/22   Moiz Velasquez MD   dilTIAZem CD (CARDIZEM CD) 240 MG 24 hr capsule Take 1 capsule by mouth Daily.    Moiz Velasquez MD   Eliquis 5 MG tablet tablet Take 1 tablet by mouth 2 (Two) Times a Day.    Moiz Velasquez MD   fluticasone (FLONASE) 50 MCG/ACT nasal spray 1 spray into the nostril(s) as directed by provider Daily.    Moiz Velasquez MD   furosemide (LASIX) 40 MG tablet Take 1 tablet by mouth Daily. 7/10/23   Moiz Velasquez MD   gabapentin (NEURONTIN) 600 MG tablet Take 1 tablet by mouth 3 (Three) Times a Day. 11/29/22   Moiz Velasquez MD   glimepiride (AMARYL) 4 MG tablet Take 1 tablet by mouth 2 (Two) Times a Day. 12/9/22   Moiz Velasquez MD   HYDROcodone-acetaminophen (NORCO) 5-325 MG per tablet Take 1 tablet by mouth Every 12 (Twelve) Hours As Needed. 11/29/22   Moiz Velasquez MD   metFORMIN (GLUCOPHAGE) 500 MG tablet Take 2 tablets by mouth 2 (Two) Times a Day With Meals. Take 2 tablets twice a day    Moiz Velasquez MD   metoprolol succinate XL (TOPROL-XL) 100 MG 24 hr tablet Take 1 tablet by mouth Daily.    Moiz Velasquez MD   potassium chloride 10 MEQ CR tablet Take 1 tablet by mouth Daily. 10/16/22   Moiz Velasquez MD   pyridostigmine (MESTINON) 60 MG tablet Take 0.5 tablets by mouth 3 (Three) Times a Day for 30 days. 10/30/23 11/29/23  Vinicio Sepulveda MD   Turmeric 400 MG capsule Take 400 mg by mouth Daily.    Moiz Velasquez MD        Social History:   Social History     Tobacco Use    Smoking status: Never    Smokeless tobacco: Never   Vaping Use    Vaping Use: Never used   Substance Use Topics    Alcohol use: Not Currently    Drug use: Never         Review of Systems:  Review of Systems   Constitutional:  Negative for chills and fever.   HENT:  Negative for congestion, rhinorrhea and sore throat.    Eyes:  Negative for pain and  "visual disturbance.   Respiratory:  Negative for apnea, cough, chest tightness and shortness of breath.    Cardiovascular:  Negative for chest pain and palpitations.   Gastrointestinal:  Negative for abdominal pain, diarrhea, nausea and vomiting.   Genitourinary:  Negative for difficulty urinating and dysuria.   Musculoskeletal:  Negative for joint swelling and myalgias.   Skin:  Negative for color change.   Neurological:  Positive for weakness. Negative for seizures and headaches.   Psychiatric/Behavioral: Negative.     All other systems reviewed and are negative.       Physical Exam:  /95   Pulse 87   Temp 98.8 °F (37.1 °C) (Oral)   Resp 16   Ht 195.6 cm (77\")   Wt (!) 214 kg (471 lb 5.5 oz)   SpO2 96%   BMI 55.89 kg/m²     Physical Exam  Vitals and nursing note reviewed.   Constitutional:       General: He is not in acute distress.     Appearance: Normal appearance. He is not toxic-appearing.   HENT:      Head: Normocephalic and atraumatic.      Jaw: There is normal jaw occlusion.   Eyes:      General: Lids are normal.      Extraocular Movements: Extraocular movements intact.      Conjunctiva/sclera: Conjunctivae normal.      Pupils: Pupils are equal, round, and reactive to light.   Cardiovascular:      Rate and Rhythm: Normal rate and regular rhythm.      Pulses: Normal pulses.      Heart sounds: Normal heart sounds.   Pulmonary:      Effort: Pulmonary effort is normal. No respiratory distress.      Breath sounds: Normal breath sounds. No wheezing or rhonchi.   Abdominal:      General: Abdomen is flat.      Palpations: Abdomen is soft.      Tenderness: There is no abdominal tenderness. There is no guarding or rebound.   Musculoskeletal:         General: Normal range of motion.      Cervical back: Normal range of motion and neck supple.      Right lower leg: No edema.      Left lower leg: No edema.   Skin:     General: Skin is warm and dry.   Neurological:      General: No focal deficit present.      " Mental Status: He is alert and oriented to person, place, and time. Mental status is at baseline.      Motor: Weakness (Generalized) present.   Psychiatric:         Mood and Affect: Mood normal.                  Procedures:  Procedures      Medical Decision Making:      Comorbidities that affect care:    Atrial Fibrillation, Obesity    External Notes reviewed:    Hospital Discharge Summary: After admission for 6th nerve palsy      The following orders were placed and all results were independently analyzed by me:  Orders Placed This Encounter   Procedures    XR Chest 1 View    Comprehensive Metabolic Panel    Lipase    Urinalysis With Culture If Indicated - Urine, Clean Catch    Digoxin Level    CBC Auto Differential    Inpatient Hospitalist Consult    CBC & Differential       Medications Given in the Emergency Department:  Medications   sodium chloride 0.9 % bolus 1,000 mL (has no administration in time range)        ED Course:         Labs:    Lab Results (last 24 hours)       Procedure Component Value Units Date/Time    CBC & Differential [[id number]]  (Abnormal) Collected: 11/01/23 1410    Specimen: Blood Updated: 11/01/23 1428    Narrative:      The following orders were created for panel order CBC & Differential.  Procedure                               Abnormality         Status                     ---------                               -----------         ------                     CBC Auto Differential[[id number]]        Abnormal            Final result                 Please view results for these tests on the individual orders.    Comprehensive Metabolic Panel [[id number]]  (Abnormal) Collected: 11/01/23 1410    Specimen: Blood Updated: 11/01/23 1444     Glucose 256 mg/dL      BUN 10 mg/dL      Creatinine 0.87 mg/dL      Sodium 140 mmol/L      Potassium 4.2 mmol/L      Comment: Slight hemolysis detected by analyzer. Results may be affected.        Chloride 101 mmol/L      CO2 28.1 mmol/L      Calcium 9.9  mg/dL      Total Protein 7.8 g/dL      Albumin 3.7 g/dL      ALT (SGPT) 14 U/L      AST (SGOT) 18 U/L      Alkaline Phosphatase 57 U/L      Total Bilirubin 0.5 mg/dL      Globulin 4.1 gm/dL      A/G Ratio 0.9 g/dL      BUN/Creatinine Ratio 11.5     Anion Gap 10.9 mmol/L      eGFR 97.0 mL/min/1.73     Narrative:      GFR Normal >60  Chronic Kidney Disease <60  Kidney Failure <15      Lipase [[id number]]  (Normal) Collected: 11/01/23 1410    Specimen: Blood Updated: 11/01/23 1444     Lipase 26 U/L     Digoxin Level [[id number]]  (Normal) Collected: 11/01/23 1410    Specimen: Blood Updated: 11/01/23 1444     Digoxin 0.65 ng/mL     CBC Auto Differential [[id number]]  (Abnormal) Collected: 11/01/23 1410    Specimen: Blood Updated: 11/01/23 1428     WBC 9.68 10*3/mm3      RBC 4.85 10*6/mm3      Hemoglobin 14.2 g/dL      Hematocrit 44.1 %      MCV 90.9 fL      MCH 29.3 pg      MCHC 32.2 g/dL      RDW 15.3 %      RDW-SD 50.3 fl      MPV 10.2 fL      Platelets 315 10*3/mm3      Neutrophil % 78.6 %      Lymphocyte % 11.9 %      Monocyte % 7.2 %      Eosinophil % 1.3 %      Basophil % 0.4 %      Immature Grans % 0.6 %      Neutrophils, Absolute 7.60 10*3/mm3      Lymphocytes, Absolute 1.15 10*3/mm3      Monocytes, Absolute 0.70 10*3/mm3      Eosinophils, Absolute 0.13 10*3/mm3      Basophils, Absolute 0.04 10*3/mm3      Immature Grans, Absolute 0.06 10*3/mm3      nRBC 0.0 /100 WBC              Imaging:    No Radiology Exams Resulted Within Past 24 Hours      Differential Diagnosis and Discussion:    Weakness: Based on the patient's history, signs, and symptoms, the diffential diagnosis includes but is not limited to meningitis, stroke, sepsis, subarachnoid hemorrhage, intracranial bleeding, encephalitis, acute uti, dehydration, MS, myasthenia gravis, Guillan Organ, migraine variant, neuromuscular disorders vertigo, electrolyte imbalance, and metabolic disorders.    All labs were reviewed and interpreted by me.    MDM  Number of  Diagnoses or Management Options  Generalized weakness  Impaired mobility and ADLs  Diagnosis management comments: In summary this is a 63-year-old male patient who presents to the emergency department for evaluation and treatment of generalized weakness.  He was recently released from the hospital has had multiple bouts of diarrhea since that time producing significant weakness at this time.  He is unable to perform ADLs and is unable to even get up to use the restroom.  CBC independently reviewed by me and shows no critical abnormalities.  CMP independently reviewed by me and shows no critical abnormalities.  Patient case has been discussed with the hospitalist team who will admit to the hospital for further evaluation and continuation of treatment.             Patient Care Considerations:    CT HEAD: I considered ordering a noncontrast CT of the head, however no focal neurologic deficit      Consultants/Shared Management Plan:    Hospitalist: I have discussed the case with Dr. Trevizo who agrees to accept the patient for admission.    Social Determinants of Health:    Patient is unable to carry out activities of daily life. Escalation of care is necessary.       Disposition and Care Coordination:    Admit:   Through independent evaluation of the patient's history, physical, and imperical data, the patient meets criteria for observation/admission to the hospital.        Final diagnoses:   Generalized weakness   Impaired mobility and ADLs        ED Disposition       ED Disposition   Decision to Admit    Condition   --    Comment   --               This medical record created using voice recognition software.             Shadi Ceron MD  11/01/23 [id number]

## 2023-11-02 LAB
ANION GAP SERPL CALCULATED.3IONS-SCNC: 7.7 MMOL/L (ref 5–15)
BASOPHILS # BLD AUTO: 0.04 10*3/MM3 (ref 0–0.2)
BASOPHILS NFR BLD AUTO: 0.5 % (ref 0–1.5)
BUN SERPL-MCNC: 11 MG/DL (ref 8–23)
BUN/CREAT SERPL: 14.7 (ref 7–25)
C COLI+JEJ+UPSA DNA STL QL NAA+NON-PROBE: NOT DETECTED
CALCIUM SPEC-SCNC: 9.3 MG/DL (ref 8.6–10.5)
CHLORIDE SERPL-SCNC: 101 MMOL/L (ref 98–107)
CO2 SERPL-SCNC: 27.3 MMOL/L (ref 22–29)
CREAT SERPL-MCNC: 0.75 MG/DL (ref 0.76–1.27)
D-LACTATE SERPL-SCNC: 1.3 MMOL/L (ref 0.5–2)
DEPRECATED RDW RBC AUTO: 50 FL (ref 37–54)
EC STX1+STX2 GENES STL QL NAA+NON-PROBE: NOT DETECTED
EGFRCR SERPLBLD CKD-EPI 2021: 101.4 ML/MIN/1.73
EOSINOPHIL # BLD AUTO: 0.25 10*3/MM3 (ref 0–0.4)
EOSINOPHIL NFR BLD AUTO: 3 % (ref 0.3–6.2)
ERYTHROCYTE [DISTWIDTH] IN BLOOD BY AUTOMATED COUNT: 14.9 % (ref 12.3–15.4)
GLUCOSE BLDC GLUCOMTR-MCNC: 200 MG/DL (ref 70–99)
GLUCOSE BLDC GLUCOMTR-MCNC: 219 MG/DL (ref 70–99)
GLUCOSE BLDC GLUCOMTR-MCNC: 239 MG/DL (ref 70–99)
GLUCOSE BLDC GLUCOMTR-MCNC: 248 MG/DL (ref 70–99)
GLUCOSE SERPL-MCNC: 216 MG/DL (ref 65–99)
HCT VFR BLD AUTO: 41.9 % (ref 37.5–51)
HGB BLD-MCNC: 13.3 G/DL (ref 13–17.7)
IMM GRANULOCYTES # BLD AUTO: 0.05 10*3/MM3 (ref 0–0.05)
IMM GRANULOCYTES NFR BLD AUTO: 0.6 % (ref 0–0.5)
LYMPHOCYTES # BLD AUTO: 1.3 10*3/MM3 (ref 0.7–3.1)
LYMPHOCYTES NFR BLD AUTO: 15.6 % (ref 19.6–45.3)
MCH RBC QN AUTO: 29 PG (ref 26.6–33)
MCHC RBC AUTO-ENTMCNC: 31.7 G/DL (ref 31.5–35.7)
MCV RBC AUTO: 91.3 FL (ref 79–97)
MONOCYTES # BLD AUTO: 0.68 10*3/MM3 (ref 0.1–0.9)
MONOCYTES NFR BLD AUTO: 8.1 % (ref 5–12)
NEUTROPHILS NFR BLD AUTO: 6.04 10*3/MM3 (ref 1.7–7)
NEUTROPHILS NFR BLD AUTO: 72.2 % (ref 42.7–76)
NRBC BLD AUTO-RTO: 0 /100 WBC (ref 0–0.2)
PLATELET # BLD AUTO: 299 10*3/MM3 (ref 140–450)
PMV BLD AUTO: 10.5 FL (ref 6–12)
POTASSIUM SERPL-SCNC: 3.6 MMOL/L (ref 3.5–5.2)
RBC # BLD AUTO: 4.59 10*6/MM3 (ref 4.14–5.8)
S ENT+BONG DNA STL QL NAA+NON-PROBE: NOT DETECTED
SHIGELLA SP+EIEC IPAH ST NAA+NON-PROBE: NOT DETECTED
SODIUM SERPL-SCNC: 136 MMOL/L (ref 136–145)
WBC NRBC COR # BLD: 8.36 10*3/MM3 (ref 3.4–10.8)

## 2023-11-02 PROCEDURE — 97165 OT EVAL LOW COMPLEX 30 MIN: CPT

## 2023-11-02 PROCEDURE — 80048 BASIC METABOLIC PNL TOTAL CA: CPT | Performed by: FAMILY MEDICINE

## 2023-11-02 PROCEDURE — 82948 REAGENT STRIP/BLOOD GLUCOSE: CPT

## 2023-11-02 PROCEDURE — 25010000002 CEFTRIAXONE PER 250 MG: Performed by: FAMILY MEDICINE

## 2023-11-02 PROCEDURE — 97161 PT EVAL LOW COMPLEX 20 MIN: CPT

## 2023-11-02 PROCEDURE — 85025 COMPLETE CBC W/AUTO DIFF WBC: CPT | Performed by: FAMILY MEDICINE

## 2023-11-02 PROCEDURE — 99232 SBSQ HOSP IP/OBS MODERATE 35: CPT | Performed by: FAMILY MEDICINE

## 2023-11-02 PROCEDURE — G0378 HOSPITAL OBSERVATION PER HR: HCPCS

## 2023-11-02 PROCEDURE — 63710000001 INSULIN LISPRO (HUMAN) PER 5 UNITS: Performed by: PHYSICIAN ASSISTANT

## 2023-11-02 RX ORDER — DEXTROSE MONOHYDRATE 25 G/50ML
25 INJECTION, SOLUTION INTRAVENOUS
Status: DISCONTINUED | OUTPATIENT
Start: 2023-11-02 | End: 2023-11-08 | Stop reason: HOSPADM

## 2023-11-02 RX ORDER — TRIAMCINOLONE ACETONIDE 1 MG/G
1 OINTMENT TOPICAL
Status: DISCONTINUED | OUTPATIENT
Start: 2023-11-02 | End: 2023-11-08 | Stop reason: HOSPADM

## 2023-11-02 RX ORDER — INSULIN LISPRO 100 [IU]/ML
2-9 INJECTION, SOLUTION INTRAVENOUS; SUBCUTANEOUS
Status: DISCONTINUED | OUTPATIENT
Start: 2023-11-02 | End: 2023-11-08 | Stop reason: HOSPADM

## 2023-11-02 RX ORDER — IBUPROFEN 600 MG/1
1 TABLET ORAL
Status: DISCONTINUED | OUTPATIENT
Start: 2023-11-02 | End: 2023-11-08 | Stop reason: HOSPADM

## 2023-11-02 RX ORDER — NICOTINE POLACRILEX 4 MG
15 LOZENGE BUCCAL
Status: DISCONTINUED | OUTPATIENT
Start: 2023-11-02 | End: 2023-11-08 | Stop reason: HOSPADM

## 2023-11-02 RX ADMIN — GABAPENTIN 300 MG: 300 CAPSULE ORAL at 21:30

## 2023-11-02 RX ADMIN — PYRIDOSTIGMINE BROMIDE 30 MG: 60 TABLET ORAL at 15:32

## 2023-11-02 RX ADMIN — METOPROLOL SUCCINATE 100 MG: 50 TABLET, EXTENDED RELEASE ORAL at 08:50

## 2023-11-02 RX ADMIN — INSULIN LISPRO 4 UNITS: 100 INJECTION, SOLUTION INTRAVENOUS; SUBCUTANEOUS at 17:21

## 2023-11-02 RX ADMIN — GABAPENTIN 600 MG: 300 CAPSULE ORAL at 15:32

## 2023-11-02 RX ADMIN — Medication 10 ML: at 21:31

## 2023-11-02 RX ADMIN — DIGOXIN 250 MCG: 250 TABLET ORAL at 08:49

## 2023-11-02 RX ADMIN — Medication 10 ML: at 00:25

## 2023-11-02 RX ADMIN — PYRIDOSTIGMINE BROMIDE 30 MG: 60 TABLET ORAL at 08:50

## 2023-11-02 RX ADMIN — HYDROCODONE BITARTRATE AND ACETAMINOPHEN 1 TABLET: 5; 325 TABLET ORAL at 00:32

## 2023-11-02 RX ADMIN — GABAPENTIN 600 MG: 300 CAPSULE ORAL at 00:23

## 2023-11-02 RX ADMIN — FUROSEMIDE 40 MG: 40 TABLET ORAL at 08:58

## 2023-11-02 RX ADMIN — INSULIN LISPRO 4 UNITS: 100 INJECTION, SOLUTION INTRAVENOUS; SUBCUTANEOUS at 08:52

## 2023-11-02 RX ADMIN — INSULIN LISPRO 4 UNITS: 100 INJECTION, SOLUTION INTRAVENOUS; SUBCUTANEOUS at 21:29

## 2023-11-02 RX ADMIN — HYDROCODONE BITARTRATE AND ACETAMINOPHEN 1 TABLET: 5; 325 TABLET ORAL at 21:29

## 2023-11-02 RX ADMIN — FLUTICASONE PROPIONATE 1 SPRAY: 50 SPRAY, METERED NASAL at 08:52

## 2023-11-02 RX ADMIN — GABAPENTIN 300 MG: 300 CAPSULE ORAL at 15:32

## 2023-11-02 RX ADMIN — GABAPENTIN 600 MG: 300 CAPSULE ORAL at 21:29

## 2023-11-02 RX ADMIN — GABAPENTIN 300 MG: 300 CAPSULE ORAL at 08:50

## 2023-11-02 RX ADMIN — CEFTRIAXONE SODIUM 2000 MG: 2 INJECTION, POWDER, FOR SOLUTION INTRAMUSCULAR; INTRAVENOUS at 23:57

## 2023-11-02 RX ADMIN — APIXABAN 5 MG: 5 TABLET, FILM COATED ORAL at 08:51

## 2023-11-02 RX ADMIN — CEFTRIAXONE SODIUM 2000 MG: 2 INJECTION, POWDER, FOR SOLUTION INTRAMUSCULAR; INTRAVENOUS at 00:22

## 2023-11-02 RX ADMIN — GABAPENTIN 600 MG: 300 CAPSULE ORAL at 08:59

## 2023-11-02 RX ADMIN — INSULIN LISPRO 4 UNITS: 100 INJECTION, SOLUTION INTRAVENOUS; SUBCUTANEOUS at 12:52

## 2023-11-02 RX ADMIN — PYRIDOSTIGMINE BROMIDE 30 MG: 60 TABLET ORAL at 00:21

## 2023-11-02 RX ADMIN — DILTIAZEM HYDROCHLORIDE 240 MG: 240 CAPSULE, EXTENDED RELEASE ORAL at 08:50

## 2023-11-02 RX ADMIN — Medication 10 ML: at 12:52

## 2023-11-02 RX ADMIN — POTASSIUM CHLORIDE 10 MEQ: 10 CAPSULE, COATED, EXTENDED RELEASE ORAL at 00:21

## 2023-11-02 RX ADMIN — GABAPENTIN 300 MG: 300 CAPSULE ORAL at 00:22

## 2023-11-02 RX ADMIN — PYRIDOSTIGMINE BROMIDE 30 MG: 60 TABLET ORAL at 21:30

## 2023-11-02 NOTE — PLAN OF CARE
Goal Outcome Evaluation:  Plan of Care Reviewed With: patient        Progress: no change  Outcome Evaluation: Patient presents with limitations affecting strength, activity tolerance, and balance impacting patient's ability to return home safely and independently.  The skills of a therapist will be required to safely and effectively implement the following treatment plan to restore maximal level of function      Anticipated Discharge Disposition (OT): inpatient rehabilitation facility, sub acute care setting

## 2023-11-02 NOTE — SIGNIFICANT NOTE
Wound Eval / Progress Noted    SELVIN Hutchinson     Patient Name: Niall Mak  : 1960  MRN: [id number]  Today's Date: 2023                 Admit Date: 2023    Visit Dx:    ICD-10-CM ICD-9-CM   1. Generalized weakness  R53.1 780.79   2. Impaired mobility and ADLs  Z74.09 V49.89    Z78.9    3. Difficulty walking  R26.2 719.7         Weakness        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        Past Surgical History:   Procedure Laterality Date    COLONOSCOPY      TONSILLECTOMY           Physical Assessment:  Wound 23 1802 Right anterior hip MASD (Moisture associated skin damage) (Active)   Wound Image   23 1803   Dressing Appearance open to air 23 1015   Closure None 23 1015   Base pink;moist 23 1015   Periwound intact;dry;pink 23 1015   Periwound Temperature warm 23 1015   Periwound Skin Turgor soft 23 1015   Edges rolled/closed 23 1015   Drainage Amount none 23 1015   Care, Wound cleansed with;sterile normal saline 23 1015   Dressing Care open to air 23 1015       Wound 23 1806 (Active)   Wound Image   23 1807   Dressing Appearance dressing loose;moist drainage 23 1015   Closure None 23 1015   Base red;moist;slough 23 1015   Periwound intact;dry;redness 23 1015   Periwound Temperature warm 23 1015   Periwound Skin Turgor soft 23 1015   Edges open 23 1015   Drainage Characteristics/Odor yellow;serosanguineous 23 1015   Drainage Amount small 23 1015   Care, Wound cleansed with;irrigated with;sterile normal saline 23 1015   Dressing Care dressing applied;silver impregnated;hydrofiber;silicone;border dressing 23 1015   Periwound  Care absorptive dressing applied 11/02/23 1015      Wound Check / Follow-up:  Patient seen today for a wound consult. Patient with thickened dry bumpy / scaly skin to the abdomen. Open area to the lower aspect of the abdomen with a fissure like appearance present. Wound base is reddened with some sloughing tissue present. Odor detected during cleansing with NS. Applied silver impregnated hydrofiber to the wound base. Recommend daily dressing changes with silver impregnated hydrofiber to the wound base. Quality skin care to the entire abdomen with triamcinolone cream to the tissue.   Abdominal folds with redness and moisture present at this time. No yeast detected at this time. Recommending application of cornstarch powder to the folds / creases.    Impression: dry thickened scaly skin, open wound base to abdomen    Short term goals:  regain skin integrity, skin protection, moisture prevention, daily dressing changes    Marsha Tao RN    11/2/2023    13:46 EDT

## 2023-11-02 NOTE — THERAPY EVALUATION
Acute Care - Physical Therapy Initial Evaluation   [place]     Patient Name: Niall Mak  : 1960  MRN: [id number]  Today's Date: 2023      Visit Dx:     ICD-10-CM ICD-9-CM   1. Generalized weakness  R53.1 780.79   2. Impaired mobility and ADLs  Z74.09 V49.89    Z78.9    3. 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     Past Surgical History:   Procedure Laterality Date    COLONOSCOPY      TONSILLECTOMY       PT Assessment (last 12 hours)       PT Evaluation and Treatment       Row Name 23 1100          Physical Therapy Time and Intention    Subjective Information no complaints  -DP     Document Type evaluation  -DP     Mode of Treatment individual therapy;physical therapy  -DP     Patient Effort good  -DP     Symptoms Noted During/After Treatment none  -DP       Row Name 23 1100          General Information    Patient Profile Reviewed yes  -DP     Patient Observations alert;cooperative;agree to therapy  -DP     Prior Level of Function independent:;gait;transfer;bed mobility  -DP     Equipment Currently Used at Home none  -DP     Existing Precautions/Restrictions fall  -DP     Barriers to Rehab none identified  -DP       Row Name 23 1100          Living Environment    Current Living Arrangements home  -DP     Home Accessibility stairs to enter home  -DP     People in Home spouse  -DP     Primary Care Provided by self  -DP       Row Name 23 1100          Home Main Entrance    Number of Stairs, Main Entrance one  -DP       Row Name 23 1100          Home Use of  Assistive/Adaptive Equipment    Equipment Currently Used at Home none  -DP       Row Name 11/02/23 1100          Cognition    Orientation Status (Cognition) oriented x 4  -DP       Row Name 11/02/23 1100          Range of Motion (ROM)    Range of Motion bilateral lower extremities;ROM is WFL  -DP       Row Name 11/02/23 1100          Strength (Manual Muscle Testing)    Strength (Manual Muscle Testing) bilateral lower extremities  4/5  -DP       Row Name 11/02/23 1100          Bed Mobility    Bed Mobility supine-sit;sit-supine  -DP     Supine-Sit [place] (Bed Mobility) contact guard  -DP     Sit-Supine [place] (Bed Mobility) contact guard  -DP     Assistive Device (Bed Mobility) head of bed elevated;bed rails  -DP       Row Name 11/02/23 1100          Transfers    Transfers other (see comments)  declined tfs due to fear of falling  -DP       Row Name 11/02/23 1100          Safety Issues, Functional Mobility    Impairments Affecting Function (Mobility) balance;endurance/activity tolerance;strength  -DP       Row Name 11/02/23 1100          Balance    Balance Assessment sitting dynamic balance  -DP     Dynamic Sitting Balance contact guard  -DP     Position, Sitting Balance unsupported;sitting edge of bed  -DP       Row Name             Wound 11/01/23 1802 Right anterior hip MASD (Moisture associated skin damage)    Wound - Properties Group Placement Date: 11/01/23  -KM Placement Time: 1802  -KM Present on Original Admission: Y  -KM Side: Right  -KM Orientation: anterior  -KM Location: hip  -KM Primary Wound Type: MASD  -KM    Retired Wound - Properties Group Placement Date: 11/01/23  -KM Placement Time: 1802  -KM Present on Original Admission: Y  -KM Side: Right  -KM Orientation: anterior  -KM Location: hip  -KM Primary Wound Type: MASD  -KM    Retired Wound - Properties Group Date first assessed: 11/01/23  -KM Time first assessed: 1802  -KM Present on Original Admission: Y  -KM Side: Right  -KM Location:  hip  -KM Primary Wound Type: MASD  -KM      Row Name             Wound 11/01/23 1806    Wound - Properties Group Placement Date: 11/01/23 -KM Placement Time: 1806 -KM    Retired Wound - Properties Group Placement Date: 11/01/23 -KM Placement Time: 1806 -KM    Retired Wound - Properties Group Date first assessed: 11/01/23 -KM Time first assessed: 1806 -KM      Row Name 11/02/23 1100          Plan of Care Review    Plan of Care Reviewed With patient  -DP     Progress no change  -DP     Outcome Evaluation Pt presents with decreased activity tolerance, decreased balance and decreased strength limiting pt's perfoamcne with functional transfers and ambulation. Pt will benefit from PT services to address these impairments and restore maximum level of function with transfers and ambulation  -DP       Row Name 11/02/23 1100          Positioning and Restraints    Pre-Treatment Position in bed  -DP     Post Treatment Position bed  -DP     In Bed supine;call light within reach;encouraged to call for assist  -DP       Row Name 11/02/23 1100          Therapy Assessment/Plan (PT)    Rehab Potential (PT) good, to achieve stated therapy goals  -DP     Criteria for Skilled Interventions Met (PT) yes;meets criteria  -DP     Therapy Frequency (PT) daily  -DP     Predicted Duration of Therapy Intervention (PT) 10 days  -DP     Problem List (PT) problems related to;balance;mobility;strength  -DP     Activity Limitations Related to Problem List (PT) unable to ambulate safely;unable to transfer safely  -DP       Row Name 11/02/23 1100          PT Evaluation Complexity    History, PT Evaluation Complexity no personal factors and/or comorbidities  -DP     Examination of Body Systems (PT Eval Complexity) total of 4 or more elements  -DP     Clinical Presentation (PT Evaluation Complexity) stable  -DP     Clinical Decision Making (PT Evaluation Complexity) low complexity  -DP     Overall Complexity (PT Evaluation Complexity) low  complexity  -DP       Row Name 11/02/23 1100          Physical Therapy Goals    Bed Mobility Goal Selection (PT) bed mobility, PT goal 1  -DP     Transfer Goal Selection (PT) transfer, PT goal 1  -DP     Gait Training Goal Selection (PT) gait training, PT goal 1  -DP       Row Name 11/02/23 1100          Bed Mobility Goal 1 (PT)    Activity/Assistive Device (Bed Mobility Goal 1, PT) sit to supine;supine to sit  -DP     [place] Level/Cues Needed (Bed Mobility Goal 1, PT) standby assist  -DP     Time Frame (Bed Mobility Goal 1, PT) 10 days  -DP       Row Name 11/02/23 1100          Transfer Goal 1 (PT)    Activity/Assistive Device (Transfer Goal 1, PT) sit-to-stand/stand-to-sit  -DP     [place] Level/Cues Needed (Transfer Goal 1, PT) contact guard required  -DP     Time Frame (Transfer Goal 1, PT) 10 days  -DP       Row Name 11/02/23 1100          Gait Training Goal 1 (PT)    Activity/Assistive Device (Gait Training Goal 1, PT) gait (walking locomotion);assistive device use;improve balance and speed;increase endurance/gait distance;walker, rolling  -DP     [place] Level (Gait Training Goal 1, PT) contact guard required  -DP     Distance (Gait Training Goal 1, PT) 80  -DP     Time Frame (Gait Training Goal 1, PT) 10 days  -DP               User Key  (r) = Recorded By, (t) = Taken By, (c) = Cosigned By      Initials Name Provider Type    Nely Richard, RN Registered Nurse    Kimberly Martinez, PT Physical Therapist                      PT Recommendation and Plan  Anticipated Discharge Disposition (PT): sub acute care setting  Planned Therapy Interventions (PT): balance training, bed mobility training, gait training, neuromuscular re-education, stair training, strengthening, transfer training  Therapy Frequency (PT): daily  Plan of Care Reviewed With: patient  Progress: no change  Outcome Evaluation: Pt presents with decreased activity tolerance, decreased balance and decreased strength limiting  pt's perfoamcne with functional transfers and ambulation. Pt will benefit from PT services to address these impairments and restore maximum level of function with transfers and ambulation   Outcome Measures       Row Name 11/02/23 1100             How much help from another person do you currently need...    Turning from your back to your side while in flat bed without using bedrails? 4  -DP      Moving from lying on back to sitting on the side of a flat bed without bedrails? 3  -DP      Moving to and from a bed to a chair (including a wheelchair)? 2  -DP      Standing up from a chair using your arms (e.g., wheelchair, bedside chair)? 2  -DP      Climbing 3-5 steps with a railing? 2  -DP      To walk in hospital room? 2  -DP      AM-PAC 6 Clicks Score (PT) 15  -DP      Highest level of mobility 4 --> Transferred to chair/commode  -DP         Functional Assessment    Outcome Measure Options AM-PAC 6 Clicks Basic Mobility (PT)  -DP                User Key  (r) = Recorded By, (t) = Taken By, (c) = Cosigned By      Initials Name Provider Type    Kimberly Martinez, PT Physical Therapist                     Time Calculation:    PT Charges       Row Name 11/02/23 1154             Time Calculation    PT Received On 11/02/23  -DP      PT Goal Re-Cert Due Date 11/11/23  -DP         Untimed Charges    PT Eval/Re-eval Minutes 30  -DP         Total Minutes    Untimed Charges Total Minutes 30  -DP       Total Minutes 30  -DP                User Key  (r) = Recorded By, (t) = Taken By, (c) = Cosigned By      Initials Name Provider Type    Kimberly Martinez, PT Physical Therapist                  Therapy Charges for Today       Code Description Service Date Service Provider Modifiers Qty    28263473290  PT EVAL LOW COMPLEXITY 2 11/2/2023 Kimberly Munoz, PT GP 1            PT G-Codes  Outcome Measure Options: AM-PAC 6 Clicks Basic Mobility (PT)  AM-PAC 6 Clicks Score (PT): 15  AM-PAC 6 Clicks Score (OT): 15    Kimberly Munoz  PT  11/2/2023

## 2023-11-02 NOTE — THERAPY EVALUATION
Patient Name: Niall Mak  : 1960    MRN: [id number]                              Today's Date: 2023       Admit Date: 2023    Visit Dx:     ICD-10-CM ICD-9-CM   1. Generalized weakness  R53.1 780.79   2. 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  Holds onto furniture when ambulating.  -PG     Prior Level of Function independent:;transfer;ADL's  -PG     Existing Precautions/Restrictions fall  -PG     Barriers to Rehab none identified  -PG       Row Name 23 1000          Occupational Profile    Reason for Services/Referral (Occupational Profile) Patient is a morbidly obese 63-year-old male admitted to the hospital for generalized weakness and immobility.  No previous OT services identified.  Patient is being evaluated by Occupational Therapy due to his recent decline in ADL function.  -PG       Row Name 23 1000          Living Environment    People in Home spouse  -PG       Row Name 23 1000          Cognition    Orientation Status (Cognition) oriented x 4  -PG       Row Name 23 1000          Safety Issues, Functional  Mobility    Impairments Affecting Function (Mobility) balance;endurance/activity tolerance;strength;motor control  -               User Key  (r) = Recorded By, (t) = Taken By, (c) = Cosigned By      Initials Name Provider Type    PG Enrrique Catherine OT Occupational Therapist                     Mobility/ADL's       Row Name 11/02/23 1002          Bed Mobility    Bed Mobility supine-sit  -     Supine-Sit [place] (Bed Mobility) minimum assist (75% patient effort)  -       Row Name 11/02/23 1002          Transfers    Transfers bed-chair transfer  -PG       Row Name 11/02/23 1002          Bed-Chair Transfer    Bed-Chair [place] (Transfers) minimum assist (75% patient effort);verbal cues;2 person assist  -     Assistive Device (Bed-Chair Transfers) walker, front-wheeled  -       Row Name 11/02/23 1002          Activities of Daily Living    BADL Assessment/Intervention bathing;upper body dressing;lower body dressing;grooming;toileting  -       Row Name 11/02/23 1002          Bathing Assessment/Intervention    [place] Level (Bathing) bathing skills;moderate assist (50% patient effort)  -       Row Name 11/02/23 1002          Upper Body Dressing Assessment/Training    [place] Level (Upper Body Dressing) upper body dressing skills;set up  -       Row Name 11/02/23 1002          Lower Body Dressing Assessment/Training    [place] Level (Lower Body Dressing) lower body dressing skills;dependent (less than 25% patient effort)  -       Row Name 11/02/23 1002          Grooming Assessment/Training    [place] Level (Grooming) grooming skills;set up  -       Row Name 11/02/23 1002          Toileting Assessment/Training    [place] Level (Toileting) toileting skills;dependent (less than 25% patient effort)  -PG               User Key  (r) = Recorded By, (t) = Taken By, (c) = Cosigned By      Initials Name Provider Type    Enrrique Patton OT Occupational Therapist                    Obj/Interventions       Row Name 11/02/23 1002          Sensory Assessment (Somatosensory)    Sensory Assessment (Somatosensory) sensation intact  -PG       Row Name 11/02/23 1002          Vision Assessment/Intervention    Visual Impairment/Limitations WFL  -PG       Row Name 11/02/23 1002          Range of Motion Comprehensive    General Range of Motion no range of motion deficits identified  -PG       Row Name 11/02/23 1002          Strength Comprehensive (MMT)    Comment, General Manual Muscle Testing (MMT) Assessment 4/5 bilateral upper extremities  -PG       Row Name 11/02/23 1002          Motor Skills    Motor Skills coordination;functional endurance  -PG     Coordination WFL  -PG     Functional Endurance Fair minus  -PG               User Key  (r) = Recorded By, (t) = Taken By, (c) = Cosigned By      Initials Name Provider Type    PG Enrrique Catherine, OT Occupational Therapist                   Goals/Plan       [facility] Name 11/02/23 1004          Transfer Goal 1 (OT)    Activity/Assistive Device (Transfer Goal 1, OT) transfers, all  -PG     [place] Level/Cues Needed (Transfer Goal 1, OT) modified independence  -PG     Time Frame (Transfer Goal 1, OT) long term goal (LTG);10 days  -PG       [facility] Name 11/02/23 1004          Bathing Goal 1 (OT)    Activity/Device (Bathing Goal 1, OT) bathing skills, all  -PG     [place] Level/Cues Needed (Bathing Goal 1, OT) modified independence  -PG     Time Frame (Bathing Goal 1, OT) long term goal (LTG);10 days  -PG       [facility] Name 11/02/23 1004          Dressing Goal 1 (OT)    Activity/Device (Dressing Goal 1, OT) dressing skills, all  -PG     [place]/Cues Needed (Dressing Goal 1, OT) modified independence  -PG     Time Frame (Dressing Goal 1, OT) long term goal (LTG);10 days  -PG       [facility] Name 11/02/23 1004          Toileting Goal 1 (OT)    Activity/Device (Toileting Goal 1, OT) toileting skills, all  -PG     [place] Level/Cues Needed (Toileting Goal 1, OT)  modified independence  -PG     Time Frame (Toileting Goal 1, OT) long term goal (LTG);10 days  -PG       Row Name 11/02/23 1004          Grooming Goal 1 (OT)    Activity/Device (Grooming Goal 1, OT) grooming skills, all  -PG     [place] (Grooming Goal 1, OT) modified independence  -PG     Time Frame (Grooming Goal 1, OT) long term goal (LTG);10 days  -PG       Row Name 11/02/23 1004          Problem Specific Goal 1 (OT)    Problem Specific Goal 1 (OT) Patient will improve activity tolerance to fair plus to support independence and engagement with ADL activities  -PG     Time Frame (Problem Specific Goal 1, OT) long term goal (LTG);10 days  -PG       Row Name 11/02/23 1004          Therapy Assessment/Plan (OT)    Planned Therapy Interventions (OT) activity tolerance training;BADL retraining;strengthening exercise;transfer/mobility retraining;patient/caregiver education/training;occupation/activity based interventions  -PG               User Key  (r) = Recorded By, (t) = Taken By, (c) = Cosigned By      Initials Name Provider Type    PG Enrrique Catherine, OT Occupational Therapist                   Clinical Impression       Row Name 11/02/23 1003          Pain Assessment    Pretreatment Pain Rating 0/10 - no pain  -PG     Posttreatment Pain Rating 0/10 - no pain  -PG       Row Name 11/02/23 1003          Plan of Care Review    Plan of Care Reviewed With patient  -PG     Progress no change  -PG     Outcome Evaluation Patient presents with limitations affecting strength, activity tolerance, and balance impacting patient's ability to return home safely and independently.  The skills of a therapist will be required to safely and effectively implement the following treatment plan to restore maximal level of function  -PG       Row Name 11/02/23 1003          Therapy Assessment/Plan (OT)    Patient/Family Therapy Goal Statement (OT) Walk better and return home independently  -PG     Rehab Potential (OT) good, to achieve  stated therapy goals  -PG     Criteria for Skilled Therapeutic Interventions Met (OT) yes;meets criteria;skilled treatment is necessary  -PG     Therapy Frequency (OT) 5 times/wk  -PG       Row Name 11/02/23 1003          Therapy Plan Review/Discharge Plan (OT)    Anticipated Discharge Disposition (OT) inpatient rehabilitation facility;sub acute care setting  -PG       Row Name 11/02/23 1006          Positioning and Restraints    Post Treatment Position chair  -PG     In Chair exit alarm on  -PG               User Key  (r) = Recorded By, (t) = Taken By, (c) = Cosigned By      Initials Name Provider Type    Enrrique Patton OT Occupational Therapist                   Outcome Measures       Row Name 11/02/23 1005          How much help from another is currently needed...    Putting on and taking off regular lower body clothing? 2  -PG     Bathing (including washing, rinsing, and drying) 2  -PG     Toileting (which includes using toilet bed pan or urinal) 1  -PG     Putting on and taking off regular upper body clothing 3  -PG     Taking care of personal grooming (such as brushing teeth) 3  -PG     Eating meals 4  -PG     AM-PAC 6 Clicks Score (OT) 15  -PG       Row Name 11/02/23 1005          Functional Assessment    Outcome Measure Options AM-PAC 6 Clicks Daily Activity (OT);Optimal Instrument  -PG       Row Name 11/02/23 1005          Optimal Instrument    Optimal Instrument Optimal - 3  -PG     Bending/Stooping 4  -PG     Standing 3  -PG     Reaching 2  -PG     From the list, choose the 3 activities you would most like to be able to do without any difficulty Bending/stooping;Standing;Reaching  -PG     Total Score Optimal - 3 9  -PG               User Key  (r) = Recorded By, (t) = Taken By, (c) = Cosigned By      Initials Name Provider Type    Enrrique Patton OT Occupational Therapist                    Occupational Therapy Education       Title: PT OT SLP Therapies (Done)       Topic: Occupational Therapy (Done)        Point: ADL training (Done)       Description:   Instruct learner(s) on proper safety adaptation and remediation techniques during self care or transfers.   Instruct in proper use of assistive devices.                  Learning Progress Summary             Patient Acceptance, E,D, DU by PG at 11/2/2023 1005                         Point: Home exercise program (Done)       Description:   Instruct learner(s) on appropriate technique for monitoring, assisting and/or progressing therapeutic exercises/activities.                  Learning Progress Summary             Patient Acceptance, E,D, DU by PG at 11/2/2023 1005                         Point: Precautions (Done)       Description:   Instruct learner(s) on prescribed precautions during self-care and functional transfers.                  Learning Progress Summary             Patient Acceptance, E,D, DU by PG at 11/2/2023 1005                         Point: Body mechanics (Done)       Description:   Instruct learner(s) on proper positioning and spine alignment during self-care, functional mobility activities and/or exercises.                  Learning Progress Summary             Patient Acceptance, E,D, DU by PG at 11/2/2023 1005                                         User Key       Initials Effective Dates Name Provider Type Discipline     06/16/21 -  Enrrique Catherine OT Occupational Therapist OT                  OT Recommendation and Plan  Planned Therapy Interventions (OT): activity tolerance training, BADL retraining, strengthening exercise, transfer/mobility retraining, patient/caregiver education/training, occupation/activity based interventions  Therapy Frequency (OT): 5 times/wk  Plan of Care Review  Plan of Care Reviewed With: patient  Progress: no change  Outcome Evaluation: Patient presents with limitations affecting strength, activity tolerance, and balance impacting patient's ability to return home safely and independently.  The skills of a  therapist will be required to safely and effectively implement the following treatment plan to restore maximal level of function     Time Calculation:   Evaluation Complexity (OT)  Review Occupational Profile/Medical/Therapy History Complexity: brief/low complexity  Assessment, Occupational Performance/Identification of Deficit Complexity: 3-5 performance deficits  Clinical Decision Making Complexity (OT): problem focused assessment/low complexity  Overall Complexity of Evaluation (OT): low complexity     Time Calculation- OT       Row Name 11/02/23 1006             Time Calculation- OT    OT Received On 11/02/23  -PG      OT Goal Re-Cert Due Date 11/11/23  -PG         Untimed Charges    OT Eval/Re-eval Minutes 35  -PG         Total Minutes    Untimed Charges Total Minutes 35  -PG       Total Minutes 35  -PG                User Key  (r) = Recorded By, (t) = Taken By, (c) = Cosigned By      Initials Name Provider Type    PG Enrrique Catherine OT Occupational Therapist                  Therapy Charges for Today       Code Description Service Date Service Provider Modifiers Qty    23639521508 HC OT EVAL LOW COMPLEXITY 3 11/2/2023 Enrrique Catherine OT GO 1                 Enrrique Catherine OT  11/2/2023

## 2023-11-02 NOTE — PROGRESS NOTES
[facility]   Hospitalist Progress Note       Patient Name: Niall Mak  : 1960  MRN: [id number]  Primary Care Physician: Shai Azar PA  Date of admission: 2023  Today's Date: 2023  Room / Bed:   3013/1  Subjective   Chief Complaint: Weakness.  Unable to ambulate around the house.    Summary:  Niall Mak is a 63 y.o. male with severe morbid obesity presents with unable to ambulate around the house/general weakness.  He was recently discharged home from the hospital about 3 days ago.  At that time rehab was recommended but he refused.  Since going home he has not gotten up out of bed.  He was brought back to the ED due to inability to ambulate or get out of bed. In the ED he has no acute findings but is unable to ambulate.       Interval Followup: 2023    Patient typically reports being able to ambulate around his house at baseline.  Usually independent with ADLs.  Able to ambulate short distances.  Uses motorized scooter if in large store.  Resting in recliner.    Complaining of double vision.  Has to shut 1 eye for improvement.  Cranial nerve palsy per recent teleneuro consult last week      REVIEW OF SYSTEMS:   Weakness.  Double vision.  Objective   Temp:  [97.7 °F (36.5 °C)-98.8 °F (37.1 °C)] 97.7 °F (36.5 °C)  Heart Rate:  [65-97] 70  Resp:  [16-20] 20  BP: (153-173)/(78-95) 153/79  PHYSICAL EXAM   CON: WN. WD. NAD.  Obese with large pannus.  NECK:  No thyromegaly. No stridor.   RESP:  CTA. No wheezes. No crackles.  No work of breathing or tachypnea.   CV:  Rhythm regular. Rate WNL. No murmur noted.  No edema.  GI:  Soft and nontender. Nondistended.    EXT: Peripheral pulses intact.  No cyanosis.  PSYCH:  Alert. Oriented. Normal affect and mood.  NEURO:  No dysarthria or aphasia. No unilateral weakness or paresthesia.  SKIN: + Chronic venous stasis changes or varicosities.  No cellulitis  Results from last 7 days   Lab Units 23  0518 23  1410  10/30/23  0512 10/29/23  1137   WBC 10*3/mm3 8.36 9.68 9.23 8.95   HEMOGLOBIN g/dL 13.3 14.2 13.0 13.2   HEMATOCRIT % 41.9 44.1 40.5 41.6   PLATELETS 10*3/mm3 299 315 297 268     Results from last 7 days   Lab Units 11/02/23  0518 11/01/23  1410 10/30/23  0512 10/29/23  1137   SODIUM mmol/L 136 140 138 138   POTASSIUM mmol/L 3.6 4.2 3.7 3.9   CO2 mmol/L 27.3 28.1 28.0 29.4*   CHLORIDE mmol/L 101 101 101 100   ANION GAP mmol/L 7.7 10.9 9.0 8.6   BUN mg/dL 11 10 11 14   CREATININE mg/dL 0.75* 0.87 0.68* 0.85   GLUCOSE mg/dL 216* 256* 251* 233*     Results from last 7 days   Lab Units 10/29/23  1137   INR  1.08     COMPLEXITY OF DATA / DECISION MAKING     []  Moderate: One acute illness or mild exacerbation of chronic or 2 stable chronic or tx side effects   []  High:  Severe acute illness or severe exacerbation of chronic - potential for major debility / life threatening         I have personally reviewed the results from the time of this admission to 11/2/2023 12:42 EDT:  []  Laboratory:  []  Microbiology: []  Radiology:  []  Telemetry:   []  Cardiology/Vascular:  []  Pathology:  []  Prior external records:  []  Independent historian provided additional details:      []  Discussed case with specialists:    []  Independent interpretation of ECG/Imaging etc:             []  Moderate: Rx management, low risk surgery, suboptimal social situation   []  High:  Rx with close monitoring for toxicity, mod-high risk surgery, DNR decision, Comfort initiated, IV pain meds    Assessment / Plan   Assessment:    Generalized weakness/unable to walk  Query UTI  Morbid obesity with BMI 55 (214 kg)  History of chronic atrial fibrillation  History of diastolic CHF  DM with neuropathy  Chronic venous stasis changes lower extremities  Horizontal diplopia, per neurology likely DM related to cranial nerve VI palsy (recommending eyepatch)       Plan:    Empiric antibiotics  Daily PT and OT   for rehab  Continue Eliquis  Recent  MRI 10/29/23 shows no evidence of ischemia or mass.  Nonspecific chronic changes  Recent noncontrasted CT head 10/29/23 shows no intracranial abnormalities    Discussed plan with RN.  DVT prophylaxis:  Medical and mechanical DVT prophylaxis orders are present.  CODE STATUS:      Code Status (Patient has no pulse and is not breathing): CPR (Attempt to Resuscitate)  Medical Interventions (Patient has pulse or is breathing): Full Support       Electronically signed by JOSESITO Emanuel, 11/02/23, 12:42 PM EDT.    Attending documentation:  I reviewed the above documentation and independently reviewed and rounded and evaluated the patient and discussed the care plan with MICHOACANO Santos PA-C, I agree with his findings and plan as documented, what I have added to the care plan and modified is as follows in my documentation and my medical decision making; 63-year-old morbidly obese male hospitalized on 11/1/2023 with weakness and difficulty ambulating, at baseline ambulates with assistive device, lives independently.  Unable to get out of bed, work-up thus far revealing patient had symptoms of UTI, with urinalysis grossly positive for UTI, urine culture growing gram-negative bacilli, treating accordingly, diarrhea, enteric panel and C. difficile negative.  Interval follow-up: Seen and examined, sitting in the bedside chair, no acute distress, no acute major night events, remains weak and fatigued, feels slightly better after starting antibiotics.  Still has double vision, still symptomatic from nerve palsy as diagnosed on previous hospitalization.  No dizziness or lightheadedness.  White blood cell count of 8000, hemoglobin 13.3, creatinine 0.75, blood sugar in the 200 range.  Review of systems obtained, all systems reviewed and negative except generalized weakness, generalized fatigue, urinary retention, dysuria symptoms.  Vitals reviewed, labs reviewed on physical exam morbidly obese male with large pannus sitting in the  bedside chair, regular rate rhythm, diminished breath sounds throughout, soft nontender abdomen, lower extremity edema with chronic venous stasis changes.  Assessment as above, plan, continue ceftriaxone 2 g daily for treatment of symptomatic UTI in male, watch for urinary retention, resumed home medications accordingly, follow-up sensitivities from urine culture and will adjust antibiotics accordingly, a.m. labs, PT/OT, full code, DVT prophylaxis with Eliquis though at his size, effectiveness of Eliquis is questionable, clinical course to dictate further management, discussed with nurse at the bedside.  More than 80 % of the time of this patient's encounter was performed by me, this included face-to-face time, planning and coordinating, medical decision making and critical thinking personally done by me.      Electronically signed by Kevin Frye MD, 11/02/23, 5:11 PM EDT.  Portions of this documentation were transcribed electronically from a voice recognition software.  I confirm all data accurately represents the service(s) I performed at today's visit.

## 2023-11-02 NOTE — PLAN OF CARE
"Goal Outcome Evaluation:      Pt got up in chair today. Pt blood sugars have been in 200\"s today. Insulin given see MAR. Pt had wound consult today and they did dressings. VSS. Call light within reach.                  "

## 2023-11-02 NOTE — PLAN OF CARE
Goal Outcome Evaluation:  Plan of Care Reviewed With: patient        Progress: no change  Outcome Evaluation: Pt presents with decreased activity tolerance, decreased balance and decreased strength limiting pt's perfoamcne with functional transfers and ambulation. Pt will benefit from PT services to address these impairments and restore maximum level of function with transfers and ambulation      Anticipated Discharge Disposition (PT): sub acute care setting

## 2023-11-02 NOTE — PLAN OF CARE
Problem: Adult Inpatient Plan of Care  Goal: Plan of Care Review  Outcome: Ongoing, Progressing  Flowsheets (Taken 11/2/2023 0556)  Progress: no change  Plan of Care Reviewed With: patient  Goal: Patient-Specific Goal (Individualized)  Outcome: Ongoing, Progressing  Goal: Absence of Hospital-Acquired Illness or Injury  Outcome: Ongoing, Progressing  Intervention: Identify and Manage Fall Risk  Recent Flowsheet Documentation  Taken 11/2/2023 0517 by Dacia José LPN  Safety Promotion/Fall Prevention: safety round/check completed  Taken 11/2/2023 0300 by Dacia José LPN  Safety Promotion/Fall Prevention: safety round/check completed  Goal: Optimal Comfort and Wellbeing  Outcome: Ongoing, Progressing  Goal: Readiness for Transition of Care  Outcome: Ongoing, Progressing   Goal Outcome Evaluation:  Plan of Care Reviewed With: patient        Progress: no change     Vitals stable. One complaint of pain this shift treated per eMAR. Slept well throughout the night. Will continue to monitor progress.

## 2023-11-03 PROBLEM — N39.0 COMPLICATED UTI (URINARY TRACT INFECTION): Status: ACTIVE | Noted: 2023-11-03

## 2023-11-03 LAB
ALBUMIN SERPL-MCNC: 3.8 G/DL (ref 3.5–5.2)
ALP SERPL-CCNC: 71 U/L (ref 39–117)
ALT SERPL W P-5'-P-CCNC: 16 U/L (ref 1–41)
ANION GAP SERPL CALCULATED.3IONS-SCNC: 8.2 MMOL/L (ref 5–15)
AST SERPL-CCNC: 16 U/L (ref 1–40)
BACTERIA SPEC AEROBE CULT: ABNORMAL
BASOPHILS # BLD AUTO: 0.06 10*3/MM3 (ref 0–0.2)
BASOPHILS NFR BLD AUTO: 0.7 % (ref 0–1.5)
BILIRUB CONJ SERPL-MCNC: <0.2 MG/DL (ref 0–0.3)
BILIRUB INDIRECT SERPL-MCNC: NORMAL MG/DL
BILIRUB SERPL-MCNC: 0.3 MG/DL (ref 0–1.2)
BUN SERPL-MCNC: 12 MG/DL (ref 8–23)
BUN/CREAT SERPL: 13 (ref 7–25)
CALCIUM SPEC-SCNC: 9.6 MG/DL (ref 8.6–10.5)
CHLORIDE SERPL-SCNC: 98 MMOL/L (ref 98–107)
CO2 SERPL-SCNC: 30.8 MMOL/L (ref 22–29)
CREAT SERPL-MCNC: 0.92 MG/DL (ref 0.76–1.27)
DEPRECATED RDW RBC AUTO: 52 FL (ref 37–54)
EGFRCR SERPLBLD CKD-EPI 2021: 93.5 ML/MIN/1.73
EOSINOPHIL # BLD AUTO: 0.35 10*3/MM3 (ref 0–0.4)
EOSINOPHIL NFR BLD AUTO: 3.8 % (ref 0.3–6.2)
ERYTHROCYTE [DISTWIDTH] IN BLOOD BY AUTOMATED COUNT: 15.2 % (ref 12.3–15.4)
GLUCOSE BLDC GLUCOMTR-MCNC: 221 MG/DL (ref 70–99)
GLUCOSE BLDC GLUCOMTR-MCNC: 236 MG/DL (ref 70–99)
GLUCOSE BLDC GLUCOMTR-MCNC: 238 MG/DL (ref 70–99)
GLUCOSE BLDC GLUCOMTR-MCNC: 278 MG/DL (ref 70–99)
GLUCOSE SERPL-MCNC: 251 MG/DL (ref 65–99)
HCT VFR BLD AUTO: 46.3 % (ref 37.5–51)
HGB BLD-MCNC: 14.5 G/DL (ref 13–17.7)
IMM GRANULOCYTES # BLD AUTO: 0.06 10*3/MM3 (ref 0–0.05)
IMM GRANULOCYTES NFR BLD AUTO: 0.7 % (ref 0–0.5)
LYMPHOCYTES # BLD AUTO: 1.39 10*3/MM3 (ref 0.7–3.1)
LYMPHOCYTES NFR BLD AUTO: 15.1 % (ref 19.6–45.3)
MAGNESIUM SERPL-MCNC: 1.9 MG/DL (ref 1.6–2.4)
MCH RBC QN AUTO: 29.2 PG (ref 26.6–33)
MCHC RBC AUTO-ENTMCNC: 31.3 G/DL (ref 31.5–35.7)
MCV RBC AUTO: 93.2 FL (ref 79–97)
MONOCYTES # BLD AUTO: 0.72 10*3/MM3 (ref 0.1–0.9)
MONOCYTES NFR BLD AUTO: 7.8 % (ref 5–12)
NEUTROPHILS NFR BLD AUTO: 6.65 10*3/MM3 (ref 1.7–7)
NEUTROPHILS NFR BLD AUTO: 71.9 % (ref 42.7–76)
NRBC BLD AUTO-RTO: 0 /100 WBC (ref 0–0.2)
PHOSPHATE SERPL-MCNC: 3.8 MG/DL (ref 2.5–4.5)
PLATELET # BLD AUTO: 331 10*3/MM3 (ref 140–450)
PMV BLD AUTO: 10.6 FL (ref 6–12)
POTASSIUM SERPL-SCNC: 4.2 MMOL/L (ref 3.5–5.2)
PROT SERPL-MCNC: 7.5 G/DL (ref 6–8.5)
RBC # BLD AUTO: 4.97 10*6/MM3 (ref 4.14–5.8)
SODIUM SERPL-SCNC: 137 MMOL/L (ref 136–145)
WBC NRBC COR # BLD: 9.23 10*3/MM3 (ref 3.4–10.8)

## 2023-11-03 PROCEDURE — 80076 HEPATIC FUNCTION PANEL: CPT | Performed by: FAMILY MEDICINE

## 2023-11-03 PROCEDURE — 63710000001 INSULIN LISPRO (HUMAN) PER 5 UNITS: Performed by: PHYSICIAN ASSISTANT

## 2023-11-03 PROCEDURE — 99232 SBSQ HOSP IP/OBS MODERATE 35: CPT | Performed by: FAMILY MEDICINE

## 2023-11-03 PROCEDURE — 82948 REAGENT STRIP/BLOOD GLUCOSE: CPT

## 2023-11-03 PROCEDURE — 85025 COMPLETE CBC W/AUTO DIFF WBC: CPT | Performed by: FAMILY MEDICINE

## 2023-11-03 PROCEDURE — 97110 THERAPEUTIC EXERCISES: CPT

## 2023-11-03 PROCEDURE — 83735 ASSAY OF MAGNESIUM: CPT | Performed by: FAMILY MEDICINE

## 2023-11-03 PROCEDURE — 84100 ASSAY OF PHOSPHORUS: CPT | Performed by: FAMILY MEDICINE

## 2023-11-03 PROCEDURE — 80048 BASIC METABOLIC PNL TOTAL CA: CPT | Performed by: FAMILY MEDICINE

## 2023-11-03 RX ORDER — LEVOFLOXACIN 750 MG/1
750 TABLET ORAL EVERY 24 HOURS
Status: DISCONTINUED | OUTPATIENT
Start: 2023-11-03 | End: 2023-11-03

## 2023-11-03 RX ADMIN — TRIAMCINOLONE ACETONIDE 1 APPLICATION: 1 OINTMENT TOPICAL at 03:19

## 2023-11-03 RX ADMIN — INSULIN LISPRO 4 UNITS: 100 INJECTION, SOLUTION INTRAVENOUS; SUBCUTANEOUS at 12:02

## 2023-11-03 RX ADMIN — INSULIN LISPRO 4 UNITS: 100 INJECTION, SOLUTION INTRAVENOUS; SUBCUTANEOUS at 08:15

## 2023-11-03 RX ADMIN — Medication 10 ML: at 20:51

## 2023-11-03 RX ADMIN — METOPROLOL SUCCINATE 100 MG: 50 TABLET, EXTENDED RELEASE ORAL at 08:16

## 2023-11-03 RX ADMIN — APIXABAN 5 MG: 5 TABLET, FILM COATED ORAL at 08:17

## 2023-11-03 RX ADMIN — Medication 5 MG: at 20:51

## 2023-11-03 RX ADMIN — GABAPENTIN 600 MG: 300 CAPSULE ORAL at 15:55

## 2023-11-03 RX ADMIN — INSULIN LISPRO 4 UNITS: 100 INJECTION, SOLUTION INTRAVENOUS; SUBCUTANEOUS at 20:49

## 2023-11-03 RX ADMIN — GABAPENTIN 600 MG: 300 CAPSULE ORAL at 20:50

## 2023-11-03 RX ADMIN — PYRIDOSTIGMINE BROMIDE 30 MG: 60 TABLET ORAL at 08:17

## 2023-11-03 RX ADMIN — INSULIN LISPRO 4 UNITS: 100 INJECTION, SOLUTION INTRAVENOUS; SUBCUTANEOUS at 17:05

## 2023-11-03 RX ADMIN — PYRIDOSTIGMINE BROMIDE 30 MG: 60 TABLET ORAL at 20:49

## 2023-11-03 RX ADMIN — Medication 10 ML: at 08:14

## 2023-11-03 RX ADMIN — GABAPENTIN 300 MG: 300 CAPSULE ORAL at 08:19

## 2023-11-03 RX ADMIN — DILTIAZEM HYDROCHLORIDE 240 MG: 240 CAPSULE, EXTENDED RELEASE ORAL at 08:16

## 2023-11-03 RX ADMIN — TRIAMCINOLONE ACETONIDE 1 APPLICATION: 1 OINTMENT TOPICAL at 15:56

## 2023-11-03 RX ADMIN — FLUTICASONE PROPIONATE 1 SPRAY: 50 SPRAY, METERED NASAL at 08:17

## 2023-11-03 RX ADMIN — GABAPENTIN 600 MG: 300 CAPSULE ORAL at 08:16

## 2023-11-03 RX ADMIN — DOCUSATE SODIUM 50MG AND SENNOSIDES 8.6MG 2 TABLET: 8.6; 5 TABLET, FILM COATED ORAL at 20:49

## 2023-11-03 RX ADMIN — ALLOPURINOL 300 MG: 300 TABLET ORAL at 08:17

## 2023-11-03 RX ADMIN — GABAPENTIN 300 MG: 300 CAPSULE ORAL at 20:50

## 2023-11-03 RX ADMIN — DIGOXIN 250 MCG: 250 TABLET ORAL at 08:16

## 2023-11-03 RX ADMIN — LEVOFLOXACIN 750 MG: 750 TABLET, FILM COATED ORAL at 08:16

## 2023-11-03 RX ADMIN — GABAPENTIN 300 MG: 300 CAPSULE ORAL at 15:55

## 2023-11-03 RX ADMIN — TRIAMCINOLONE ACETONIDE 1 APPLICATION: 1 OINTMENT TOPICAL at 20:51

## 2023-11-03 RX ADMIN — PYRIDOSTIGMINE BROMIDE 30 MG: 60 TABLET ORAL at 15:55

## 2023-11-03 NOTE — PROGRESS NOTES
[facility]   Hospitalist Progress Note       Patient Name: Niall Mak  : 1960  MRN: [id number]  Primary Care Physician: Shai Azar PA  Date of admission: 2023  Today's Date: 11/3/2023  Room / Bed:   3013/1  Subjective   Chief Complaint: Weakness.  Unable to ambulate around the house.    Summary:  Niall Mak is a 63 y.o. male with severe morbid obesity presents with unable to ambulate around the house/general weakness.  He was recently discharged home from the hospital about 3 days ago.  At that time rehab was recommended but he refused.  Since going home he has not gotten up out of bed.  He was brought back to the ED due to inability to ambulate or get out of bed. In the ED he has no acute findings but is unable to ambulate.       Interval Followup: 11/3/2023    Feels a little bit better today.  Resting in bed.  No overnight complaints.  Still weak and below baseline  Urine cultures with Citrobacter  Patient will be going to rehab.  Discussed disposition.  Referrals pending.      REVIEW OF SYSTEMS:   Weakness.  Double vision.  Objective   Temp:  [97.6 °F (36.4 °C)-98.7 °F (37.1 °C)] 97.6 °F (36.4 °C)  Heart Rate:  [76-91] 84  Resp:  [18-20] 20  BP: (152-170)/(78-98) 169/81  PHYSICAL EXAM   CON: WN. WD. NAD.  Obese with large pannus.  NECK:  No thyromegaly. No stridor.   RESP:  CTA. No wheezes. No crackles.  No work of breathing or tachypnea.   CV:  Rhythm regular. Rate WNL. No murmur noted.  No edema.  GI:  Soft and nontender. Nondistended.    EXT: Peripheral pulses intact.  No cyanosis.  PSYCH:  Alert. Oriented. Normal affect and mood.  NEURO:  No dysarthria or aphasia. No unilateral weakness or paresthesia.  SKIN: + Chronic venous stasis changes or varicosities.  No cellulitis  Results from last 7 days   Lab Units 23  0453 23  0518 23  1410 10/30/23  0512 10/29/23  1137   WBC 10*3/mm3 9.23 8.36 9.68 9.23 8.95   HEMOGLOBIN g/dL 14.5 13.3 14.2 13.0 13.2    HEMATOCRIT % 46.3 41.9 44.1 40.5 41.6   PLATELETS 10*3/mm3 331 299 315 297 268     Results from last 7 days   Lab Units 11/03/23  0453 11/02/23  0518 11/01/23  1410 10/30/23  0512 10/29/23  1137   SODIUM mmol/L 137 136 140 138 138   POTASSIUM mmol/L 4.2 3.6 4.2 3.7 3.9   CO2 mmol/L 30.8* 27.3 28.1 28.0 29.4*   CHLORIDE mmol/L 98 101 101 101 100   ANION GAP mmol/L 8.2 7.7 10.9 9.0 8.6   BUN mg/dL 12 11 10 11 14   CREATININE mg/dL 0.92 0.75* 0.87 0.68* 0.85   GLUCOSE mg/dL 251* 216* 256* 251* 233*     Results from last 7 days   Lab Units 10/29/23  1137   INR  1.08     COMPLEXITY OF DATA / DECISION MAKING     []  Moderate: One acute illness or mild exacerbation of chronic or 2 stable chronic or tx side effects   []  High:  Severe acute illness or severe exacerbation of chronic - potential for major debility / life threatening         I have personally reviewed the results from the time of this admission to 11/3/2023 12:09 EDT:  []  Laboratory:  []  Microbiology: []  Radiology:  []  Telemetry:   []  Cardiology/Vascular:  []  Pathology:  []  Prior external records:  []  Independent historian provided additional details:      []  Discussed case with specialists:    []  Independent interpretation of ECG/Imaging etc:             []  Moderate: Rx management, low risk surgery, suboptimal social situation   []  High:  Rx with close monitoring for toxicity, mod-high risk surgery, DNR decision, Comfort initiated, IV pain meds    Assessment / Plan   Assessment:    Generalized weakness/unable to walk  Citrobacter UTI  Morbid obesity with BMI 55 (214 kg)  History of chronic atrial fibrillation  History of diastolic CHF  DM with neuropathy  Chronic venous stasis changes lower extremities  Horizontal diplopia, per neurology likely DM related to cranial nerve VI palsy (recommending eyepatch)       Plan:    Rehab referrals.    Mobilize.  PT/OT.  Antibiotics for UTI based on cultures/sensitivities  Eyepatch, discussed with store  room  Daily PT and OT   for rehab  Continue Eliquis  Recent MRI 10/29/23 shows no evidence of ischemia or mass.  Nonspecific chronic changes  Recent noncontrasted CT head 10/29/23 shows no intracranial abnormalities    Discussed plan with RN.  DVT prophylaxis:  Medical and mechanical DVT prophylaxis orders are present.  CODE STATUS:      Code Status (Patient has no pulse and is not breathing): CPR (Attempt to Resuscitate)  Medical Interventions (Patient has pulse or is breathing): Full Support             Attending documentation:  I reviewed the above documentation and independently reviewed and rounded and evaluated the patient and discussed the care plan with MICHOACANO Santos PA-C, I agree with his findings and plan as documented, what I have added to the care plan and modified is as follows in my documentation and my medical decision making; 63-year-old morbidly obese male hospitalized on 11/1/2023 with weakness and difficulty ambulating, at baseline ambulates with assistive device, lives independently.  Unable to get out of bed, work-up thus far revealing patient had symptoms of UTI, with urinalysis grossly positive for UTI, urine culture growing gram-negative bacilli turned out to be multidrug-resistant Citrobacter, gentamicin ordered, treating accordingly, diarrhea, enteric panel and C. difficile negative.  Interval follow-up: Seen and examined laying in bed, no acute distress, no acute major overnight events, remains weak and fatigued, recommended eyepatch for his double vision, still complains of being symptomatic from nerve palsy as diagnosed on previous hospitalization.  No dizziness or lightheadedness.  Hemoglobin 14.5, white blood cell count 9000, creatinine 0.92, potassium 4.2, sodium 137.  Review of systems obtained, all systems reviewed and negative except generalized weakness, generalized fatigue, double vision.  Vitals reviewed, labs reviewed on physical exam morbidly obese male with large  pannus sitting in the bedside chair, regular rate rhythm, diminished breath sounds throughout, soft nontender abdomen, lower extremity edema with chronic venous stasis changes.  Assessment as above, plan, stop ceftriaxone and Levaquin, start gentamicin IV for treatment of symptomatic UTI in male, watch for urinary retention, resumed home medications accordingly, follow-up sensitivities from urine culture and will adjust antibiotics accordingly, a.m. labs, PT/OT, full code, DVT prophylaxis with Eliquis though at his size, effectiveness of Eliquis is questionable, clinical course to dictate further management, discussed with nurse at the bedside.  More than 75 % of the time of this patient's encounter was performed by me, this included face-to-face time, planning and coordinating, medical decision making and critical thinking personally done by me.  -AVBMD      Electronically signed by Kevin Frye MD, 11/03/23, 4:23 PM EDT.  Portions of this documentation were transcribed electronically from a voice recognition software.  I confirm all data accurately represents the service(s) I performed at today's visit.

## 2023-11-03 NOTE — THERAPY TREATMENT NOTE
"Acute Care - Physical Therapy Treatment Note  [facility]     Patient Name: Niall Mak  : 1960  MRN: [id number]  Today's Date: 11/3/2023      Visit Dx:     ICD-10-CM ICD-9-CM   1. Generalized weakness  R53.1 780.79   2. Impaired mobility and ADLs  Z74.09 V49.89    Z78.9    3. Difficulty walking  R26.2 719.7     Patient Active Problem List   Diagnosis    Sepsis    Atrial fibrillation with rapid ventricular response    6th nerve palsy    Weakness    Complicated UTI (urinary tract infection)     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     Past Surgical History:   Procedure Laterality Date    COLONOSCOPY      TONSILLECTOMY       PT Assessment (last 12 hours)       PT Evaluation and Treatment       Row Name 23 1100          Physical Therapy Time and Intention    Subjective Information complains of;weakness;fatigue (P)   -RR     Document Type therapy note (daily note) (P)   -RR     Mode of Treatment individual therapy;physical therapy (P)   -RR     Patient Effort adequate (P)   -RR     Symptoms Noted During/After Treatment none (P)   -RR       Row Name 23 1100          Bed Mobility    Bed Mobility supine-sit;sit-supine (P)   -RR     Supine-Sit [place] (Bed Mobility) standby assist (P)   -RR     Sit-Supine [place] (Bed Mobility) minimum assist (75% patient effort) (P)   -RR     Assistive Device (Bed Mobility) bed rails (P)   -RR       Row Name 23 1100          Transfers    Comment, (Transfers) Declined transfers - pt stated he felt \"unsteady\" in sitting and didn't want to stand due to fear of falling (P)   -RR       Row Name 23 1100          Balance    Balance Assessment sitting static balance;sitting dynamic balance (P)   -RR     Static Sitting " Balance independent (P)   -RR     Dynamic Sitting Balance standby assist (P)   -RR     Position, Sitting Balance unsupported;sitting edge of bed (P)   Pt relied heavily on bedrails  -RR       Row Name 11/03/23 1100          Motor Skills    Motor Skills functional endurance (P)   -RR     Therapeutic Exercise hip;knee;ankle (P)   -RR       Row Name 11/03/23 1100          Hip (Therapeutic Exercise)    Hip (Therapeutic Exercise) isometric exercises (P)   -RR     Hip Isometrics (Therapeutic Exercise) bilateral;gluteal sets;sitting;10 repetitions;2 sets;3 second hold (P)   -RR       Row Name 11/03/23 1100          Knee (Therapeutic Exercise)    Knee (Therapeutic Exercise) AROM (active range of motion) (P)   -RR     Knee AROM (Therapeutic Exercise) bilateral;LAQ (long arc quad);sitting;10 repetitions;2 sets (P)   -RR       Row Name 11/03/23 1100          Ankle (Therapeutic Exercise)    Ankle (Therapeutic Exercise) AROM (active range of motion) (P)   -RR     Ankle AROM (Therapeutic Exercise) bilateral;dorsiflexion;plantarflexion;sitting;30 repititions (P)   -RR       Row Name             Wound 11/01/23 1802 Right anterior hip MASD (Moisture associated skin damage)    Wound - Properties Group Placement Date: 11/01/23  -KM Placement Time: 1802  -KM Present on Original Admission: Y  -KM Side: Right  -KM Orientation: anterior  -KM Location: hip  -KM Primary Wound Type: MASD  -KM    Retired Wound - Properties Group Placement Date: 11/01/23  -KM Placement Time: 1802  -KM Present on Original Admission: Y  -KM Side: Right  -KM Orientation: anterior  -KM Location: hip  -KM Primary Wound Type: MASD  -KM    Retired Wound - Properties Group Date first assessed: 11/01/23  -KM Time first assessed: 1802  -KM Present on Original Admission: Y  -KM Side: Right  -KM Location: hip  -KM Primary Wound Type: MASD  -KM      Row Name             Wound 11/01/23 1806    Wound - Properties Group Placement Date: 11/01/23  -KM Placement Time: 1806  -KM  "   Retired Wound - Properties Group Placement Date: 11/01/23  -KM Placement Time: 1806 -KM    Retired Wound - Properties Group Date first assessed: 11/01/23  -KM Time first assessed: 1806 -KM      Row Name 11/03/23 1100          Positioning and Restraints    Pre-Treatment Position in bed (P)   -RR     Post Treatment Position bed (P)   -RR     In Bed call light within reach;encouraged to call for assist (P)   Exit alarm not in place upon entry  -RR       Row Name 11/03/23 1100          Progress Summary (PT)    Progress Toward Functional Goals (PT) progress toward functional goals is gradual (P)   -RR     Daily Progress Summary (PT) Pt declined transfers and ambulation today due to fear of falling and feeling \"weak\"/\"off-balance\". He performed therapeutic exercises in sitting and was able to move supine to sit with SBA. He demonstrates progress towards his goals, which are still appropriate. He will continue to benefit from skilled PT intervention, continue current POC. (P)   -RR               User Key  (r) = Recorded By, (t) = Taken By, (c) = Cosigned By      Initials Name Provider Type    Nely Richard, RN Registered Nurse    RR Luz Magaña, PT Student PT Student                      PT Recommendation and Plan     Progress Summary (PT)  Progress Toward Functional Goals (PT): (P) progress toward functional goals is gradual  Daily Progress Summary (PT): (P) Pt declined transfers and ambulation today due to fear of falling and feeling \"weak\"/\"off-balance\". He performed therapeutic exercises in sitting and was able to move supine to sit with SBA. He demonstrates progress towards his goals, which are still appropriate. He will continue to benefit from skilled PT intervention, continue current POC.   Outcome Measures       Row Name 11/03/23 1100 11/02/23 1100          How much help from another person do you currently need...    Turning from your back to your side while in flat bed without using bedrails? 4 " (P)   -RR 4  -DP     Moving from lying on back to sitting on the side of a flat bed without bedrails? 3 (P)   -RR 3  -DP     Moving to and from a bed to a chair (including a wheelchair)? 2 (P)   -RR 2  -DP     Standing up from a chair using your arms (e.g., wheelchair, bedside chair)? 2 (P)   -RR 2  -DP     Climbing 3-5 steps with a railing? 2 (P)   -RR 2  -DP     To walk in hospital room? 2 (P)   -RR 2  -DP     AM-PAC 6 Clicks Score (PT) 15 (P)   -RR 15  -DP     Highest level of mobility 4 --> Transferred to chair/commode (P)   -RR 4 --> Transferred to chair/commode  -DP        Functional Assessment    Outcome Measure Options -- AM-PAC 6 Clicks Basic Mobility (PT)  -DP               User Key  (r) = Recorded By, (t) = Taken By, (c) = Cosigned By      Initials Name Provider Type    Kimberly Martinez, PT Physical Therapist    Luz Griffith, PT Student PT Student                     Time Calculation:    PT Charges       Row Name 11/03/23 1145             Time Calculation    PT Received On 11/03/23 (P)   -RR         Timed Charges    49661 - PT Therapeutic Exercise Minutes 13 (P)   -RR      11342 - PT Therapeutic Activity Minutes 4 (P)   -RR         Total Minutes    Timed Charges Total Minutes 17 (P)   -RR       Total Minutes 17 (P)   -RR                User Key  (r) = Recorded By, (t) = Taken By, (c) = Cosigned By      Initials Name Provider Type    RR Luz Magaña, PT Student PT Student                      PT G-Codes  Outcome Measure Options: AM-PAC 6 Clicks Basic Mobility (PT)  AM-PAC 6 Clicks Score (PT): (P) 15  AM-PAC 6 Clicks Score (OT): 15    Luz Magaña PT Student  11/3/2023

## 2023-11-03 NOTE — PLAN OF CARE
Goal Outcome Evaluation:      Performed wound care as ordered. VSS. Call light within reach.

## 2023-11-03 NOTE — SIGNIFICANT NOTE
11/03/23 1015   Coping/Psychosocial   Observed Emotional State calm;cooperative   Verbalized Emotional State hopefulness   Trust Relationship/Rapport empathic listening provided   Involvement in Care interacting with patient   Additional Documentation Spiritual Care (Group)   Spiritual Care   Use of Spiritual Resources non-[place]din use of spiritual care   Spiritual Care Source  initiative   Spiritual Care Follow-Up follow-up, none required as presently assessed   Response to Spiritual Care receptive of support;engaged in conversation   Spiritual Care Interventions supportive conversation provided   Spiritual Care Visit Type initial   Receptivity to Spiritual Care visit welcomed

## 2023-11-03 NOTE — PLAN OF CARE
Problem: Fall Injury Risk  Goal: Absence of Fall and Fall-Related Injury  11/3/2023 1856 by Jason Riddle RN  Outcome: Ongoing, Progressing  11/3/2023 1855 by Jason Riddle RN  Outcome: Ongoing, Not Progressing  Intervention: Promote Injury-Free Environment  Recent Flowsheet Documentation  Taken 11/3/2023 1732 by Jason Riddle RN  Safety Promotion/Fall Prevention:   safety round/check completed   assistive device/personal items within reach   clutter free environment maintained   fall prevention program maintained  Taken 11/3/2023 1505 by Jason Riddle RN  Safety Promotion/Fall Prevention:   safety round/check completed   clutter free environment maintained   assistive device/personal items within reach   fall prevention program maintained  Taken 11/3/2023 1320 by Jason Riddle RN  Safety Promotion/Fall Prevention:   safety round/check completed   clutter free environment maintained   assistive device/personal items within reach   fall prevention program maintained  Taken 11/3/2023 1108 by Jason Riddle RN  Safety Promotion/Fall Prevention:   safety round/check completed   clutter free environment maintained   assistive device/personal items within reach   fall prevention program maintained  Taken 11/3/2023 0936 by Jason Riddle RN  Safety Promotion/Fall Prevention:   safety round/check completed   clutter free environment maintained   assistive device/personal items within reach   fall prevention program maintained  Taken 11/3/2023 0721 by Jason Riddle RN  Safety Promotion/Fall Prevention:   safety round/check completed   clutter free environment maintained   assistive device/personal items within reach   fall prevention program maintained     Problem: Skin Injury Risk Increased  Goal: Skin Health and Integrity  11/3/2023 1856 by Jason Riddle RN  Outcome: Ongoing, Progressing  11/3/2023 1855 by Jason Riddle RN  Outcome: Ongoing, Not Progressing     Problem: Adult Inpatient Plan of Care  Goal: Plan of  Care Review  11/3/2023 1856 by Jason Riddle RN  Outcome: Ongoing, Progressing  11/3/2023 1855 by Jason Riddle RN  Outcome: Ongoing, Not Progressing  Goal: Patient-Specific Goal (Individualized)  11/3/2023 1856 by Jason Riddle RN  Outcome: Ongoing, Progressing  11/3/2023 1855 by Jason Riddle RN  Outcome: Ongoing, Not Progressing  Goal: Absence of Hospital-Acquired Illness or Injury  11/3/2023 1856 by Jason Riddle RN  Outcome: Ongoing, Progressing  11/3/2023 1855 by Jason Riddle RN  Outcome: Ongoing, Not Progressing  Intervention: Identify and Manage Fall Risk  Recent Flowsheet Documentation  Taken 11/3/2023 1732 by Jason Riddle RN  Safety Promotion/Fall Prevention:   safety round/check completed   assistive device/personal items within reach   clutter free environment maintained   fall prevention program maintained  Taken 11/3/2023 1505 by Jason Riddle RN  Safety Promotion/Fall Prevention:   safety round/check completed   clutter free environment maintained   assistive device/personal items within reach   fall prevention program maintained  Taken 11/3/2023 1320 by Jason Riddle RN  Safety Promotion/Fall Prevention:   safety round/check completed   clutter free environment maintained   assistive device/personal items within reach   fall prevention program maintained  Taken 11/3/2023 1108 by Jason Riddle RN  Safety Promotion/Fall Prevention:   safety round/check completed   clutter free environment maintained   assistive device/personal items within reach   fall prevention program maintained  Taken 11/3/2023 0936 by Jason Riddle RN  Safety Promotion/Fall Prevention:   safety round/check completed   clutter free environment maintained   assistive device/personal items within reach   fall prevention program maintained  Taken 11/3/2023 0721 by Jason Riddle RN  Safety Promotion/Fall Prevention:   safety round/check completed   clutter free environment maintained   assistive device/personal items  within reach   fall prevention program maintained  Intervention: Prevent Infection  Recent Flowsheet Documentation  Taken 11/3/2023 1732 by Jason Riddle RN  Infection Prevention:   rest/sleep promoted   personal protective equipment utilized   hand hygiene promoted   equipment surfaces disinfected  Taken 11/3/2023 1505 by Jason Riddle RN  Infection Prevention:   rest/sleep promoted   personal protective equipment utilized   hand hygiene promoted   equipment surfaces disinfected  Taken 11/3/2023 1320 by Jason Riddle RN  Infection Prevention:   rest/sleep promoted   personal protective equipment utilized   hand hygiene promoted   equipment surfaces disinfected  Taken 11/3/2023 1108 by Jason Riddle RN  Infection Prevention:   rest/sleep promoted   personal protective equipment utilized   hand hygiene promoted   equipment surfaces disinfected  Taken 11/3/2023 0936 by Jason Riddle RN  Infection Prevention:   rest/sleep promoted   personal protective equipment utilized   hand hygiene promoted   equipment surfaces disinfected  Taken 11/3/2023 0721 by Jason Riddle RN  Infection Prevention:   rest/sleep promoted   personal protective equipment utilized   hand hygiene promoted   equipment surfaces disinfected  Goal: Optimal Comfort and Wellbeing  11/3/2023 1856 by Jason Riddle RN  Outcome: Ongoing, Progressing  11/3/2023 1855 by Jason Riddle RN  Outcome: Ongoing, Not Progressing  Goal: Readiness for Transition of Care  11/3/2023 1856 by Jason Riddle RN  Outcome: Ongoing, Progressing  11/3/2023 1855 by Jason Riddle RN  Outcome: Ongoing, Not Progressing   Goal Outcome Evaluation:

## 2023-11-04 LAB
GLUCOSE BLDC GLUCOMTR-MCNC: 248 MG/DL (ref 70–99)
GLUCOSE BLDC GLUCOMTR-MCNC: 250 MG/DL (ref 70–99)
GLUCOSE BLDC GLUCOMTR-MCNC: 268 MG/DL (ref 70–99)
GLUCOSE BLDC GLUCOMTR-MCNC: 310 MG/DL (ref 70–99)

## 2023-11-04 PROCEDURE — 82948 REAGENT STRIP/BLOOD GLUCOSE: CPT

## 2023-11-04 PROCEDURE — 63710000001 INSULIN LISPRO (HUMAN) PER 5 UNITS: Performed by: PHYSICIAN ASSISTANT

## 2023-11-04 PROCEDURE — 25010000002 GENTAMICIN PER 80 MG: Performed by: FAMILY MEDICINE

## 2023-11-04 PROCEDURE — 99232 SBSQ HOSP IP/OBS MODERATE 35: CPT | Performed by: INTERNAL MEDICINE

## 2023-11-04 RX ADMIN — PYRIDOSTIGMINE BROMIDE 30 MG: 60 TABLET ORAL at 22:14

## 2023-11-04 RX ADMIN — GABAPENTIN 300 MG: 300 CAPSULE ORAL at 22:12

## 2023-11-04 RX ADMIN — GABAPENTIN 600 MG: 300 CAPSULE ORAL at 22:13

## 2023-11-04 RX ADMIN — PYRIDOSTIGMINE BROMIDE 30 MG: 60 TABLET ORAL at 15:02

## 2023-11-04 RX ADMIN — INSULIN LISPRO 4 UNITS: 100 INJECTION, SOLUTION INTRAVENOUS; SUBCUTANEOUS at 12:06

## 2023-11-04 RX ADMIN — FUROSEMIDE 40 MG: 40 TABLET ORAL at 08:32

## 2023-11-04 RX ADMIN — ALLOPURINOL 300 MG: 300 TABLET ORAL at 08:32

## 2023-11-04 RX ADMIN — GABAPENTIN 600 MG: 300 CAPSULE ORAL at 15:01

## 2023-11-04 RX ADMIN — DIGOXIN 250 MCG: 250 TABLET ORAL at 08:32

## 2023-11-04 RX ADMIN — Medication 10 ML: at 22:15

## 2023-11-04 RX ADMIN — INSULIN LISPRO 7 UNITS: 100 INJECTION, SOLUTION INTRAVENOUS; SUBCUTANEOUS at 22:12

## 2023-11-04 RX ADMIN — TRIAMCINOLONE ACETONIDE 1 APPLICATION: 1 OINTMENT TOPICAL at 22:16

## 2023-11-04 RX ADMIN — GABAPENTIN 300 MG: 300 CAPSULE ORAL at 15:01

## 2023-11-04 RX ADMIN — FLUTICASONE PROPIONATE 1 SPRAY: 50 SPRAY, METERED NASAL at 08:33

## 2023-11-04 RX ADMIN — METOPROLOL SUCCINATE 100 MG: 50 TABLET, EXTENDED RELEASE ORAL at 08:32

## 2023-11-04 RX ADMIN — INSULIN LISPRO 6 UNITS: 100 INJECTION, SOLUTION INTRAVENOUS; SUBCUTANEOUS at 17:07

## 2023-11-04 RX ADMIN — GABAPENTIN 600 MG: 300 CAPSULE ORAL at 08:34

## 2023-11-04 RX ADMIN — GABAPENTIN 300 MG: 300 CAPSULE ORAL at 08:32

## 2023-11-04 RX ADMIN — TRIAMCINOLONE ACETONIDE 1 APPLICATION: 1 OINTMENT TOPICAL at 10:38

## 2023-11-04 RX ADMIN — APIXABAN 5 MG: 5 TABLET, FILM COATED ORAL at 08:32

## 2023-11-04 RX ADMIN — GENTAMICIN SULFATE 700 MG: 40 INJECTION, SOLUTION INTRAMUSCULAR; INTRAVENOUS at 08:32

## 2023-11-04 RX ADMIN — Medication 10 ML: at 08:34

## 2023-11-04 RX ADMIN — PYRIDOSTIGMINE BROMIDE 30 MG: 60 TABLET ORAL at 08:32

## 2023-11-04 RX ADMIN — Medication 5 MG: at 22:14

## 2023-11-04 RX ADMIN — INSULIN LISPRO 6 UNITS: 100 INJECTION, SOLUTION INTRAVENOUS; SUBCUTANEOUS at 08:31

## 2023-11-04 RX ADMIN — DILTIAZEM HYDROCHLORIDE 240 MG: 240 CAPSULE, EXTENDED RELEASE ORAL at 08:33

## 2023-11-04 NOTE — PLAN OF CARE
Problem: Fall Injury Risk  Goal: Absence of Fall and Fall-Related Injury  Outcome: Ongoing, Progressing  Intervention: Promote Injury-Free Environment  Recent Flowsheet Documentation  Taken 11/4/2023 1502 by Joelle Moralse RN  Safety Promotion/Fall Prevention: safety round/check completed  Taken 11/4/2023 1330 by Joelle Morales RN  Safety Promotion/Fall Prevention: safety round/check completed  Taken 11/4/2023 1207 by Joelle Morales RN  Safety Promotion/Fall Prevention: safety round/check completed  Taken 11/4/2023 1038 by Joelle Morales RN  Safety Promotion/Fall Prevention: safety round/check completed  Taken 11/4/2023 0944 by Joelle Morales RN  Safety Promotion/Fall Prevention: safety round/check completed  Taken 11/4/2023 0832 by Joelle Morales RN  Safety Promotion/Fall Prevention: safety round/check completed  Taken 11/4/2023 0733 by Joelle Morales RN  Safety Promotion/Fall Prevention: safety round/check completed     Problem: Skin Injury Risk Increased  Goal: Skin Health and Integrity  Outcome: Ongoing, Progressing     Problem: Adult Inpatient Plan of Care  Goal: Plan of Care Review  Outcome: Ongoing, Progressing  Flowsheets (Taken 11/4/2023 1638)  Progress: no change  Plan of Care Reviewed With: patient  Outcome Evaluation: VSS. No complaints of pain or discomfort. Dressing changes done today as ordered. Will continue plan of care.  Goal: Patient-Specific Goal (Individualized)  Outcome: Ongoing, Progressing  Goal: Absence of Hospital-Acquired Illness or Injury  Outcome: Ongoing, Progressing  Intervention: Identify and Manage Fall Risk  Recent Flowsheet Documentation  Taken 11/4/2023 1502 by Joelle Morales RN  Safety Promotion/Fall Prevention: safety round/check completed  Taken 11/4/2023 1330 by Joelle Morales RN  Safety Promotion/Fall Prevention: safety round/check completed  Taken 11/4/2023 1207 by Joelle Morales RN  Safety Promotion/Fall Prevention: safety round/check completed  Taken  11/4/2023 1038 by Joelle Morales RN  Safety Promotion/Fall Prevention: safety round/check completed  Taken 11/4/2023 0944 by Joelle Morales RN  Safety Promotion/Fall Prevention: safety round/check completed  Taken 11/4/2023 0832 by Joelle Morales RN  Safety Promotion/Fall Prevention: safety round/check completed  Taken 11/4/2023 0733 by Joelle Morales RN  Safety Promotion/Fall Prevention: safety round/check completed  Goal: Optimal Comfort and Wellbeing  Outcome: Ongoing, Progressing  Goal: Readiness for Transition of Care  Outcome: Ongoing, Progressing   Goal Outcome Evaluation:  Plan of Care Reviewed With: patient        Progress: no change  Outcome Evaluation: VSS. No complaints of pain or discomfort. Dressing changes done today as ordered. Will continue plan of care.

## 2023-11-04 NOTE — PLAN OF CARE
Goal Outcome Evaluation:           Progress: no change  Outcome Evaluation: Pt rested well during shift. Pt did refuse pm dose of eliquis, he stated he only takes it once per day at home. No complaints or concerns voiced during shift. VSS. Will continue plan of care.

## 2023-11-04 NOTE — PROGRESS NOTES
[facility]   Hospitalist Progress Note       Patient Name: Niall Mak  : 1960  MRN: [id number]  Primary Care Physician: Shai Azar PA  Date of admission: 2023  Today's Date: 2023  Room / Bed:   3013/1  Subjective   Chief Complaint: Weakness.  Unable to ambulate around the house.    Summary:  Niall Mak is a 63 y.o. male with severe morbid obesity presents with unable to ambulate around the house/general weakness.  He was recently discharged home from the hospital about 3 days ago.  At that time rehab was recommended but he refused.  Since going home he has not gotten up out of bed.  He was brought back to the ED due to inability to ambulate or get out of bed. In the ED he has no acute findings but is unable to ambulate.       Interval Followup: 2023    Up in bed.  Alert and interactive.  No new issues or overnight complaints.  He feels a little bit stronger.    He is doing some exercises while in bed.  Seems motivated.  But still weak and below baseline  Urine cultures with Citrobacter  Discussed with patient about disposition/going to rehab.  Referrals pending  Stable vital signs      REVIEW OF SYSTEMS:   Weakness.  Double vision.  Objective   Temp:  [97.6 °F (36.4 °C)-98.5 °F (36.9 °C)] 97.7 °F (36.5 °C)  Heart Rate:  [] 89  Resp:  [20] 20  BP: (136-186)/(71-91) 136/72  PHYSICAL EXAM   CON: WN. WD. NAD.  Obese with large pannus.  NECK:  No thyromegaly. No stridor.   RESP:  CTA. No wheezes. No crackles.  No work of breathing or tachypnea.   CV:  Rhythm regular. Rate WNL. No murmur noted.  No edema.  GI:  Soft and nontender. Nondistended.    EXT: Peripheral pulses intact.  No cyanosis.  PSYCH:  Alert. Oriented. Normal affect and mood.  NEURO:  No dysarthria or aphasia. No unilateral weakness or paresthesia.  SKIN: + Chronic venous stasis changes or varicosities.  No cellulitis  Results from last 7 days   Lab Units 23  0453 23  0518 23  1410  10/30/23  0512 10/29/23  1137   WBC 10*3/mm3 9.23 8.36 9.68 9.23 8.95   HEMOGLOBIN g/dL 14.5 13.3 14.2 13.0 13.2   HEMATOCRIT % 46.3 41.9 44.1 40.5 41.6   PLATELETS 10*3/mm3 331 299 315 297 268     Results from last 7 days   Lab Units 11/03/23  0453 11/02/23  0518 11/01/23  1410 10/30/23  0512 10/29/23  1137   SODIUM mmol/L 137 136 140 138 138   POTASSIUM mmol/L 4.2 3.6 4.2 3.7 3.9   CO2 mmol/L 30.8* 27.3 28.1 28.0 29.4*   CHLORIDE mmol/L 98 101 101 101 100   ANION GAP mmol/L 8.2 7.7 10.9 9.0 8.6   BUN mg/dL 12 11 10 11 14   CREATININE mg/dL 0.92 0.75* 0.87 0.68* 0.85   GLUCOSE mg/dL 251* 216* 256* 251* 233*     Results from last 7 days   Lab Units 10/29/23  1137   INR  1.08     COMPLEXITY OF DATA / DECISION MAKING     []  Moderate: One acute illness or mild exacerbation of chronic or 2 stable chronic or tx side effects   []  High:  Severe acute illness or severe exacerbation of chronic - potential for major debility / life threatening         I have personally reviewed the results from the time of this admission to 11/4/2023 11:27 EDT:  []  Laboratory:  []  Microbiology: []  Radiology:  []  Telemetry:   []  Cardiology/Vascular:  []  Pathology:  []  Prior external records:  []  Independent historian provided additional details:      []  Discussed case with specialists:    []  Independent interpretation of ECG/Imaging etc:             []  Moderate: Rx management, low risk surgery, suboptimal social situation   []  High:  Rx with close monitoring for toxicity, mod-high risk surgery, DNR decision, Comfort initiated, IV pain meds    Assessment / Plan   Assessment:    Generalized weakness/unable to walk  Citrobacter UTI  Morbid obesity with BMI 55 (214 kg)  History of chronic atrial fibrillation  History of diastolic CHF  DM with neuropathy  Chronic venous stasis changes lower extremities  Horizontal diplopia, per neurology 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  Continue PT/OT.  Gentamicin for UTI based on cultures/sensitivities  Eyepatch for diplopia  Daily PT and OT   for rehab  Continue Eliquis  Recent MRI 10/29/23 shows no evidence of ischemia or mass.  Nonspecific chronic changes  Recent noncontrasted CT head 10/29/23 shows no intracranial abnormalities    Discussed plan with RN.  DVT prophylaxis:  Medical and mechanical DVT prophylaxis orders are present.  CODE STATUS:      Code Status (Patient has no pulse and is not breathing): CPR (Attempt to Resuscitate)  Medical Interventions (Patient has pulse or is breathing): Full Support         Patient independently seen and evaluated, agree with assessment and plan, above documentation reflects plan put forth during bedside rounds.  More than 51% of the time of this patient encounter was performed by me.    Interval history: No acute events overnight, patient states his weakness is improving, patient received dose of gentamicin yesterday    GEN: No acute distress, morbidly obese  HEENT: Moist mucous membranes  LUNGS: Equal chest rise bilaterally  CARDIAC: Regular rate and rhythm  NEURO: Moving all 4 extremities spontaneously  SKIN: No obvious breakdown    Plan:  Agree with assessment and plan as above  Patient encouraged to lose weight given his body habitus and poor mobility  Continue eyepatch for diplopia  Continue PT/OT  Continue Eliquis  MRI personally reviewed, no acute intracranial abnormalities identified  Completed antibiotics for UTI, continue to monitor for improvement  CBC, CMP reviewed  Repeat CBC, CMP, mag and Phos in a.m.    Reviewed patients labs and imaging, and discussed with patient and nurse at bedside.      Electronically signed by Arsalan Serrano MD, 11/04/23, 12:08 PM EDT.

## 2023-11-05 LAB
ANION GAP SERPL CALCULATED.3IONS-SCNC: 8.6 MMOL/L (ref 5–15)
BASOPHILS # BLD AUTO: 0.05 10*3/MM3 (ref 0–0.2)
BASOPHILS NFR BLD AUTO: 0.5 % (ref 0–1.5)
BUN SERPL-MCNC: 10 MG/DL (ref 8–23)
BUN/CREAT SERPL: 13.7 (ref 7–25)
CALCIUM SPEC-SCNC: 9.6 MG/DL (ref 8.6–10.5)
CHLORIDE SERPL-SCNC: 97 MMOL/L (ref 98–107)
CO2 SERPL-SCNC: 29.4 MMOL/L (ref 22–29)
CREAT SERPL-MCNC: 0.73 MG/DL (ref 0.76–1.27)
DEPRECATED RDW RBC AUTO: 49.2 FL (ref 37–54)
EGFRCR SERPLBLD CKD-EPI 2021: 102.2 ML/MIN/1.73
EOSINOPHIL # BLD AUTO: 0.3 10*3/MM3 (ref 0–0.4)
EOSINOPHIL NFR BLD AUTO: 3.2 % (ref 0.3–6.2)
ERYTHROCYTE [DISTWIDTH] IN BLOOD BY AUTOMATED COUNT: 14.9 % (ref 12.3–15.4)
GLUCOSE BLDC GLUCOMTR-MCNC: 257 MG/DL (ref 70–99)
GLUCOSE BLDC GLUCOMTR-MCNC: 286 MG/DL (ref 70–99)
GLUCOSE BLDC GLUCOMTR-MCNC: 287 MG/DL (ref 70–99)
GLUCOSE BLDC GLUCOMTR-MCNC: 323 MG/DL (ref 70–99)
GLUCOSE SERPL-MCNC: 270 MG/DL (ref 65–99)
HCT VFR BLD AUTO: 46.3 % (ref 37.5–51)
HGB BLD-MCNC: 14.9 G/DL (ref 13–17.7)
IMM GRANULOCYTES # BLD AUTO: 0.05 10*3/MM3 (ref 0–0.05)
IMM GRANULOCYTES NFR BLD AUTO: 0.5 % (ref 0–0.5)
LYMPHOCYTES # BLD AUTO: 1.28 10*3/MM3 (ref 0.7–3.1)
LYMPHOCYTES NFR BLD AUTO: 13.9 % (ref 19.6–45.3)
MAGNESIUM SERPL-MCNC: 1.8 MG/DL (ref 1.6–2.4)
MCH RBC QN AUTO: 29 PG (ref 26.6–33)
MCHC RBC AUTO-ENTMCNC: 32.2 G/DL (ref 31.5–35.7)
MCV RBC AUTO: 90.3 FL (ref 79–97)
MONOCYTES # BLD AUTO: 0.71 10*3/MM3 (ref 0.1–0.9)
MONOCYTES NFR BLD AUTO: 7.7 % (ref 5–12)
NEUTROPHILS NFR BLD AUTO: 6.85 10*3/MM3 (ref 1.7–7)
NEUTROPHILS NFR BLD AUTO: 74.2 % (ref 42.7–76)
NRBC BLD AUTO-RTO: 0 /100 WBC (ref 0–0.2)
PHOSPHATE SERPL-MCNC: 3.1 MG/DL (ref 2.5–4.5)
PLATELET # BLD AUTO: 347 10*3/MM3 (ref 140–450)
PMV BLD AUTO: 10.4 FL (ref 6–12)
POTASSIUM SERPL-SCNC: 4 MMOL/L (ref 3.5–5.2)
RBC # BLD AUTO: 5.13 10*6/MM3 (ref 4.14–5.8)
SODIUM SERPL-SCNC: 135 MMOL/L (ref 136–145)
WBC NRBC COR # BLD: 9.24 10*3/MM3 (ref 3.4–10.8)

## 2023-11-05 PROCEDURE — 99232 SBSQ HOSP IP/OBS MODERATE 35: CPT | Performed by: FAMILY MEDICINE

## 2023-11-05 PROCEDURE — 85025 COMPLETE CBC W/AUTO DIFF WBC: CPT | Performed by: PHYSICIAN ASSISTANT

## 2023-11-05 PROCEDURE — 97110 THERAPEUTIC EXERCISES: CPT

## 2023-11-05 PROCEDURE — 82948 REAGENT STRIP/BLOOD GLUCOSE: CPT

## 2023-11-05 PROCEDURE — 84100 ASSAY OF PHOSPHORUS: CPT | Performed by: PHYSICIAN ASSISTANT

## 2023-11-05 PROCEDURE — 97530 THERAPEUTIC ACTIVITIES: CPT

## 2023-11-05 PROCEDURE — 63710000001 INSULIN LISPRO (HUMAN) PER 5 UNITS: Performed by: PHYSICIAN ASSISTANT

## 2023-11-05 PROCEDURE — 83735 ASSAY OF MAGNESIUM: CPT | Performed by: PHYSICIAN ASSISTANT

## 2023-11-05 PROCEDURE — 63710000001 INSULIN DETEMIR PER 5 UNITS: Performed by: PHYSICIAN ASSISTANT

## 2023-11-05 PROCEDURE — 80048 BASIC METABOLIC PNL TOTAL CA: CPT | Performed by: PHYSICIAN ASSISTANT

## 2023-11-05 RX ADMIN — INSULIN LISPRO 7 UNITS: 100 INJECTION, SOLUTION INTRAVENOUS; SUBCUTANEOUS at 12:37

## 2023-11-05 RX ADMIN — PYRIDOSTIGMINE BROMIDE 30 MG: 60 TABLET ORAL at 21:54

## 2023-11-05 RX ADMIN — INSULIN DETEMIR 10 UNITS: 100 INJECTION, SOLUTION SUBCUTANEOUS at 08:39

## 2023-11-05 RX ADMIN — DILTIAZEM HYDROCHLORIDE 240 MG: 240 CAPSULE, EXTENDED RELEASE ORAL at 08:37

## 2023-11-05 RX ADMIN — INSULIN LISPRO 6 UNITS: 100 INJECTION, SOLUTION INTRAVENOUS; SUBCUTANEOUS at 08:39

## 2023-11-05 RX ADMIN — ALLOPURINOL 300 MG: 300 TABLET ORAL at 08:37

## 2023-11-05 RX ADMIN — INSULIN LISPRO 6 UNITS: 100 INJECTION, SOLUTION INTRAVENOUS; SUBCUTANEOUS at 21:59

## 2023-11-05 RX ADMIN — Medication 10 ML: at 08:36

## 2023-11-05 RX ADMIN — FLUTICASONE PROPIONATE 1 SPRAY: 50 SPRAY, METERED NASAL at 08:39

## 2023-11-05 RX ADMIN — GABAPENTIN 600 MG: 300 CAPSULE ORAL at 21:55

## 2023-11-05 RX ADMIN — Medication 10 ML: at 21:55

## 2023-11-05 RX ADMIN — GABAPENTIN 300 MG: 300 CAPSULE ORAL at 16:55

## 2023-11-05 RX ADMIN — DIGOXIN 250 MCG: 250 TABLET ORAL at 08:38

## 2023-11-05 RX ADMIN — TRIAMCINOLONE ACETONIDE 1 APPLICATION: 1 OINTMENT TOPICAL at 22:02

## 2023-11-05 RX ADMIN — PYRIDOSTIGMINE BROMIDE 30 MG: 60 TABLET ORAL at 16:55

## 2023-11-05 RX ADMIN — APIXABAN 5 MG: 5 TABLET, FILM COATED ORAL at 08:38

## 2023-11-05 RX ADMIN — METOPROLOL SUCCINATE 100 MG: 50 TABLET, EXTENDED RELEASE ORAL at 08:38

## 2023-11-05 RX ADMIN — GABAPENTIN 300 MG: 300 CAPSULE ORAL at 08:38

## 2023-11-05 RX ADMIN — TRIAMCINOLONE ACETONIDE 1 APPLICATION: 1 OINTMENT TOPICAL at 12:37

## 2023-11-05 RX ADMIN — GABAPENTIN 600 MG: 300 CAPSULE ORAL at 16:55

## 2023-11-05 RX ADMIN — GABAPENTIN 600 MG: 300 CAPSULE ORAL at 08:36

## 2023-11-05 RX ADMIN — INSULIN LISPRO 6 UNITS: 100 INJECTION, SOLUTION INTRAVENOUS; SUBCUTANEOUS at 18:04

## 2023-11-05 RX ADMIN — PYRIDOSTIGMINE BROMIDE 30 MG: 60 TABLET ORAL at 08:37

## 2023-11-05 RX ADMIN — GABAPENTIN 300 MG: 300 CAPSULE ORAL at 21:55

## 2023-11-05 NOTE — PROGRESS NOTES
[facility]   Hospitalist Progress Note       Patient Name: Niall Mak  : 1960  MRN: [id number]  Primary Care Physician: Shai Azar PA  Date of admission: 2023  Today's Date: 2023  Room / Bed:   3013/1  Subjective   Chief Complaint: Weakness.  Unable to ambulate around the house.    Summary:  Niall Mak is a 63 y.o. male with severe morbid obesity presents with unable to ambulate around the house/general weakness.  He was recently discharged home from the hospital about 3 days ago.  At that time rehab was recommended but he refused.  Since going home he has not gotten up out of bed.  He was brought back to the ED due to inability to ambulate or get out of bed. In the ED he has no acute findings but is unable to ambulate.       Interval Followup: 2023    Seen and examined.  Alert and conversational.  Has not been out of bed today.  No new issues or overnight complaints.  He feels a little bit stronger.    He is doing some exercises while in bed.  Seems motivated.  But still weak and below baseline  Urine cultures with Citrobacter  Referrals pending  Stable vital signs  Glucose could be better controlled      REVIEW OF SYSTEMS:   Weakness.  Double vision.  Objective   Temp:  [97.7 °F (36.5 °C)-98.6 °F (37 °C)] 98.2 °F (36.8 °C)  Heart Rate:  [69-89] 80  Resp:  [18-22] 22  BP: (128-185)/(67-98) 185/98  PHYSICAL EXAM   CON: WN. WD. NAD.  Obese with large pannus.  NECK:  No thyromegaly. No stridor.   RESP:  CTA. No wheezes. No crackles.  No work of breathing or tachypnea.   CV:  Rhythm regular. Rate WNL. No murmur noted.  No edema.  GI:  Soft and nontender. Nondistended.    EXT: Peripheral pulses intact.  No cyanosis.  PSYCH:  Alert. Oriented. Normal affect and mood.  NEURO:  No dysarthria or aphasia. No unilateral weakness or paresthesia.  SKIN: + Chronic venous stasis changes or varicosities.  No cellulitis  Results from last 7 days   Lab Units 23  7394  11/03/23  0453 11/02/23  0518 11/01/23  1410 10/30/23  0512 10/29/23  1137   WBC 10*3/mm3 9.24 9.23 8.36 9.68 9.23 8.95   HEMOGLOBIN g/dL 14.9 14.5 13.3 14.2 13.0 13.2   HEMATOCRIT % 46.3 46.3 41.9 44.1 40.5 41.6   PLATELETS 10*3/mm3 347 331 299 315 297 268     Results from last 7 days   Lab Units 11/05/23  0502 11/03/23  0453 11/02/23  0518 11/01/23  1410 10/30/23  0512 10/29/23  1137   SODIUM mmol/L 135* 137 136 140 138 138   POTASSIUM mmol/L 4.0 4.2 3.6 4.2 3.7 3.9   CO2 mmol/L 29.4* 30.8* 27.3 28.1 28.0 29.4*   CHLORIDE mmol/L 97* 98 101 101 101 100   ANION GAP mmol/L 8.6 8.2 7.7 10.9 9.0 8.6   BUN mg/dL 10 12 11 10 11 14   CREATININE mg/dL 0.73* 0.92 0.75* 0.87 0.68* 0.85   GLUCOSE mg/dL 270* 251* 216* 256* 251* 233*     Results from last 7 days   Lab Units 10/29/23  1137   INR  1.08     COMPLEXITY OF DATA / DECISION MAKING     []  Moderate: One acute illness or mild exacerbation of chronic or 2 stable chronic or tx side effects   []  High:  Severe acute illness or severe exacerbation of chronic - potential for major debility / life threatening         I have personally reviewed the results from the time of this admission to 11/5/2023 10:35 EST:  []  Laboratory:  []  Microbiology: []  Radiology:  []  Telemetry:   []  Cardiology/Vascular:  []  Pathology:  []  Prior external records:  []  Independent historian provided additional details:      []  Discussed case with specialists:    []  Independent interpretation of ECG/Imaging etc:             []  Moderate: Rx management, low risk surgery, suboptimal social situation   []  High:  Rx with close monitoring for toxicity, mod-high risk surgery, DNR decision, Comfort initiated, IV pain meds    Assessment / Plan   Assessment:    Generalized weakness/unable to walk  Citrobacter UTI  Morbid obesity with BMI 55 (214 kg)  History of chronic atrial fibrillation  History of diastolic CHF  DM with neuropathy  Chronic venous stasis changes lower extremities  Horizontal  diplopia, per neurology 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  Continue PT/OT.  Gentamicin for UTI based on cultures/sensitivities  Eyepatch for diplopia  Daily PT and OT   for rehab  Continue Eliquis  Recent MRI 10/29/23 shows no evidence of ischemia or mass.  Nonspecific chronic changes  Recent noncontrasted CT head 10/29/23 shows no intracranial abnormalities    Discussed plan with RN.  DVT prophylaxis:  Medical and mechanical DVT prophylaxis orders are present.  CODE STATUS:      Code Status (Patient has no pulse and is not breathing): CPR (Attempt to Resuscitate)  Medical Interventions (Patient has pulse or is breathing): Full Support       Attending documentation:  I reviewed the above documentation and independently reviewed and rounded and evaluated the patient and discussed the care plan with MICHOACANO Santos PA-C, I agree with his findings and plan as documented, what I have added to the care plan and modified is as follows in my documentation and my medical decision making; 63-year-old morbidly obese male hospitalized on 11/1/2023 with weakness and difficulty ambulating, at baseline ambulates with assistive device, lives independently.  Unable to get out of bed, work-up thus far revealing patient had symptoms of UTI, with urinalysis grossly positive for UTI, urine culture growing gram-negative bacilli turned out to be multidrug-resistant Citrobacter, gentamicin ordered, treating accordingly, diarrhea, enteric panel and C. difficile negative.  Interval follow-up: Seen and examined laying in bed, no acute distress, no acute major overnight events, remains weak and fatigued, wearing a right eyepatch for his double vision, fits better, able to see better.  Still has incontinence, blood sugars are elevated, started on basal insulin.   Potassium 4.0, sodium 135, creatinine 0.73, white blood cell count 9000.  Review of systems obtained, all systems reviewed and negative except generalized weakness, generalized fatigue, double vision without wearing eye patch.  Vitals reviewed, labs reviewed on physical exam morbidly obese male with large pannus sitting in the bedside chair, regular rate rhythm, diminished breath sounds throughout, soft nontender abdomen, lower extremity edema with chronic venous stasis changes.  Assessment as above, plan, status post single dose of gentamicin IV for treatment of symptomatic UTI in male, watch for urinary retention and/or symptoms of further dysuria, start insulin, Levemir 10 units daily continue insulin sliding scale coverage, continued home medications accordingly, out of bed to chair a.m. labs, PT/OT, full code, DVT prophylaxis with Eliquis though at his size, effectiveness of Eliquis is questionable, clinical course to dictate further management, discussed with nurse at the bedside.  More than 65 % of the time of this patient's encounter was performed by me, this included face-to-face time, planning and coordinating, medical decision making and critical thinking personally done by me.  -AVBMD   Electronically signed by Kevin Frye MD, 11/05/23, 4:28 PM EST.  Portions of this documentation were transcribed electronically from a voice recognition software.  I confirm all data accurately represents the service(s) I performed at today's visit.

## 2023-11-05 NOTE — THERAPY TREATMENT NOTE
Acute Care - Physical Therapy Treatment Note  [facility]     Patient Name: Niall Mak  : 1960  MRN: [id number]  Today's Date: 2023      Visit Dx:     ICD-10-CM ICD-9-CM   1. Generalized weakness  R53.1 780.79   2. Impaired mobility and ADLs  Z74.09 V49.89    Z78.9    3. Difficulty walking  R26.2 719.7     Patient Active Problem List   Diagnosis    Sepsis    Atrial fibrillation with rapid ventricular response    6th nerve palsy    Weakness    Complicated UTI (urinary tract infection)     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     Past Surgical History:   Procedure Laterality Date    COLONOSCOPY      TONSILLECTOMY       PT Assessment (last 12 hours)       PT Evaluation and Treatment       Row Name 23 1220          Physical Therapy Time and Intention    Subjective Information complains of;weakness;fatigue;pain  unsteadiness  -DK     Document Type therapy note (daily note)  -DK     Mode of Treatment individual therapy;physical therapy  -DK     Patient Effort good  -DK     Symptoms Noted During/After Treatment fatigue  unsteady  -DK     Comment Pt is very fearful of falling.  -DK       Row Name 23 1220          Pain    Pretreatment Pain Rating 0/10 - no pain  -DK     Posttreatment Pain Rating 0/10 - no pain  -DK       Row Name 23 1220          Cognition    Affect/Mental Status (Cognition) WNL  -DK     Orientation Status (Cognition) oriented x 4  -DK     Follows Commands (Cognition) WNL  -DK     Cognitive Function WNL  -DK     Personal Safety Interventions gait belt;nonskid shoes/slippers when out of bed;supervised activity  -DK       Row Name 23 1220          Bed Mobility    Bed Mobility supine-sit-supine;scooting/bridging  -DK     Scooting/Bridging  [place] (Bed Mobility) standby assist  -DK     Supine-Sit [place] (Bed Mobility) standby assist  -DK     Sit-Supine [place] (Bed Mobility) standby assist;contact guard;1 person assist  -DK     Supine-Sit-Supine [place] (Bed Mobility) standby assist;contact guard;1 person assist  -DK     Bed Mobility, Safety Issues decreased use of legs for bridging/pushing  -DK     Assistive Device (Bed Mobility) bed rails  -       Row Name 11/05/23 1220          Transfers    Transfers sit-stand transfer;stand-sit transfer  -DK     Comment, (Transfers) Pt stood EOB x 30 seconds with a rolling walker on room air, with assist x 2 persons before returning to bed post treatment.  -       Row Name 11/05/23 1220          Sit-Stand Transfer    Sit-Stand [place] (Transfers) minimum assist (75% patient effort);2 person assist  -     Assistive Device (Sit-Stand Transfers) walker, front-wheeled  -       Row Name 11/05/23 1220          Stand-Sit Transfer    Stand-Sit [place] (Transfers) minimum assist (75% patient effort);2 person assist  -     Assistive Device (Stand-Sit Transfers) walker, front-wheeled  -       Row Name 11/05/23 1220          Safety Issues, Functional Mobility    Impairments Affecting Function (Mobility) balance;endurance/activity tolerance;visual/perceptual;postural/trunk control;strength  -       Row Name 11/05/23 1220          Balance    Balance Assessment sitting static balance;sitting dynamic balance;standing static balance  -     Static Sitting Balance standby assist  -     Dynamic Sitting Balance standby assist  -     Position, Sitting Balance unsupported;sitting edge of bed  -     Static Standing Balance contact guard;2-person assist  -     Position/Device Used, Standing Balance walker, front-wheeled  -     Balance Interventions standing;supported;static;tandem standing  -       Row Name 11/05/23 1220          Motor Skills    Motor Skills --  therapeutic  exercises  -DK     Coordination WFL  -DK     Therapeutic Exercise hip;knee;ankle  -DK       Row Name 11/05/23 1220          Hip (Therapeutic Exercise)    Hip (Therapeutic Exercise) AAROM (active assistive range of motion)  -     Hip AAROM (Therapeutic Exercise) bilateral;flexion;extension;aBduction;aDduction;supine;10 repetitions;2 sets  -DK       Row Name 11/05/23 1220          Knee (Therapeutic Exercise)    Knee (Therapeutic Exercise) AAROM (active assistive range of motion)  -     Knee AAROM (Therapeutic Exercise) bilateral;flexion;extension;supine;10 repetitions;2 sets  -       Row Name 11/05/23 1220          Ankle (Therapeutic Exercise)    Ankle (Therapeutic Exercise) AAROM (active assistive range of motion)  -     Ankle AAROM (Therapeutic Exercise) bilateral;dorsiflexion;plantarflexion;supine;10 repetitions;2 sets  -       Row Name             Wound 11/01/23 1802 Right anterior hip MASD (Moisture associated skin damage)    Wound - Properties Group Placement Date: 11/01/23  -KM Placement Time: 1802  -KM Present on Original Admission: Y  -KM Side: Right  -KM Orientation: anterior  -KM Location: hip  -KM Primary Wound Type: MASD  -KM    Retired Wound - Properties Group Placement Date: 11/01/23  -KM Placement Time: 1802  -KM Present on Original Admission: Y  -KM Side: Right  -KM Orientation: anterior  -KM Location: hip  -KM Primary Wound Type: MASD  -KM    Retired Wound - Properties Group Date first assessed: 11/01/23  -KM Time first assessed: 1802  -KM Present on Original Admission: Y  -KM Side: Right  -KM Location: hip  -KM Primary Wound Type: MASD  -KM      Row Name             Wound 11/01/23 1806    Wound - Properties Group Placement Date: 11/01/23  -KM Placement Time: 1806  -KM    Retired Wound - Properties Group Placement Date: 11/01/23  -KM Placement Time: 1806  -KM    Retired Wound - Properties Group Date first assessed: 11/01/23  -KM Time first assessed: 1806  -KM      Row Name 11/05/23 1220           Plan of Care Review    Plan of Care Reviewed With patient  -DK     Progress improving  -DK       Row Name 11/05/23 1220          Positioning and Restraints    Pre-Treatment Position in bed  -DK     Post Treatment Position bed  -DK     In Bed supine;call light within reach;encouraged to call for assist;side rails up x3;patient within staff view;with other staff;legs elevated  -DK       Row Name 11/05/23 1220          Therapy Assessment/Plan (PT)    Rehab Potential (PT) fair, will monitor progress closely  -DK     Criteria for Skilled Interventions Met (PT) skilled treatment is necessary  -DK     Therapy Frequency (PT) daily  -DK     Problem List (PT) balance;mobility;strength;vision  -DK     Activity Limitations Related to Problem List (PT) unable to ambulate safely;unable to transfer safely  -DK       Row Name 11/05/23 1220          Progress Summary (PT)    Progress Toward Functional Goals (PT) progress toward functional goals is fair  -DK               User Key  (r) = Recorded By, (t) = Taken By, (c) = Cosigned By      Initials Name Provider Type    Nely Richard, RN Registered Nurse    Nilda Aggarwal PTA Physical Therapist Assistant                      PT Recommendation and Plan  Planned Therapy Interventions (PT): balance training, bed mobility training, gait training, strengthening, transfer training  Therapy Frequency (PT): daily  Progress Summary (PT)  Progress Toward Functional Goals (PT): progress toward functional goals is fair  Plan of Care Reviewed With: patient  Progress: improving   Outcome Measures       Row Name 11/05/23 1220 11/03/23 1100          How much help from another person do you currently need...    Turning from your back to your side while in flat bed without using bedrails? 4  -DK 4  -WHITNEY (r) RR (t) WHITNEY (c)     Moving from lying on back to sitting on the side of a flat bed without bedrails? 4  -DK 3  -WHITNEY (r) RR (t) WHITNEY (c)     Moving to and from a bed to a chair (including a  wheelchair)? 2  -DK 2  -WHITNEY (r) RR (t) WHITNEY (c)     Standing up from a chair using your arms (e.g., wheelchair, bedside chair)? 3  -DK 2  -WHITNEY (r) RR (t) WHITNEY (c)     Climbing 3-5 steps with a railing? 2  -DK 2  -WHITNEY (r) RR (t) WHITNEY (c)     To walk in hospital room? 2  -DK 2  -WHITNEY (r) RR (t) WHITNEY (c)     AM-PAC 6 Clicks Score (PT) 17  -DK 15  -WHITNEY (r) RR (t)     Highest level of mobility 5 --> Static standing  -DK 4 --> Transferred to chair/commode  -WHITNEY (r) RR (t)        Functional Assessment    Outcome Measure Options AM-PAC 6 Clicks Basic Mobility (PT)  -DK --               User Key  (r) = Recorded By, (t) = Taken By, (c) = Cosigned By      Initials Name Provider Type    Nilda Aggarwal PTA Physical Therapist Assistant    Sergio Hernández, PT Physical Therapist    Luz Griffith, PT Student PT Student                     Time Calculation:    PT Charges       Row Name 11/05/23 1227             Time Calculation    PT Received On 11/05/23  -DK      PT Goal Re-Cert Due Date 11/11/23  -DK         Timed Charges    70153 - PT Therapeutic Exercise Minutes 14  -DK      56848 - Gait Training Minutes  1  -DK      89613 - PT Therapeutic Activity Minutes 10  -DK         Total Minutes    Timed Charges Total Minutes 25  -DK       Total Minutes 25  -DK                User Key  (r) = Recorded By, (t) = Taken By, (c) = Cosigned By      Initials Name Provider Type    Nilda Aggarwal PTA Physical Therapist Assistant                  Therapy Charges for Today       Code Description Service Date Service Provider Modifiers Qty    01728477860 HC PT THER PROC EA 15 MIN 11/5/2023 Nilda Mattson PTA GP 1    07646267536 HC PT THERAPEUTIC ACT EA 15 MIN 11/5/2023 Nilda Mattson PTA GP 1            PT G-Codes  Outcome Measure Options: AM-PAC 6 Clicks Basic Mobility (PT)  AM-PAC 6 Clicks Score (PT): 17  AM-PAC 6 Clicks Score (OT): 15    Nilda Mattson PTA  11/5/2023

## 2023-11-05 NOTE — PLAN OF CARE
Goal Outcome Evaluation:  Patient with no acute events thus far this shift.  Patient with dressing change done due to other coming off.  No other needs voiced thus far this shift. Continue plan of care.

## 2023-11-05 NOTE — PLAN OF CARE
Goal Outcome Evaluation:                      No complaints of pain or discomfort. Wound and skin care done as ordered, tolerated well. No issues at this time, continue plan of care.       Problem: Fall Injury Risk  Goal: Absence of Fall and Fall-Related Injury  Outcome: Ongoing, Progressing  Intervention: Promote Injury-Free Environment  Recent Flowsheet Documentation  Taken 11/5/2023 1820 by Liana Hernandez RN  Safety Promotion/Fall Prevention: safety round/check completed  Taken 11/5/2023 1655 by Liana Hernandez RN  Safety Promotion/Fall Prevention: safety round/check completed  Taken 11/5/2023 1500 by Liana Hernandez RN  Safety Promotion/Fall Prevention: safety round/check completed  Taken 11/5/2023 1237 by Liana Hernandez RN  Safety Promotion/Fall Prevention: safety round/check completed  Taken 11/5/2023 1232 by Liana Hernandez RN  Safety Promotion/Fall Prevention: safety round/check completed  Taken 11/5/2023 1100 by Liana Hernandez RN  Safety Promotion/Fall Prevention: safety round/check completed  Taken 11/5/2023 0837 by Liana Hernandez RN  Safety Promotion/Fall Prevention: safety round/check completed  Taken 11/5/2023 0805 by Liana Hernandez RN  Safety Promotion/Fall Prevention: safety round/check completed  Taken 11/5/2023 0735 by Liana Hernandez RN  Safety Promotion/Fall Prevention: safety round/check completed     Problem: Skin Injury Risk Increased  Goal: Skin Health and Integrity  Outcome: Ongoing, Progressing     Problem: Adult Inpatient Plan of Care  Goal: Plan of Care Review  Outcome: Ongoing, Progressing  Flowsheets (Taken 11/5/2023 1220 by Nilda Mattson PTA)  Progress: improving  Plan of Care Reviewed With: patient  Goal: Patient-Specific Goal (Individualized)  Outcome: Ongoing, Progressing  Goal: Absence of Hospital-Acquired Illness or Injury  Outcome: Ongoing, Progressing  Intervention: Identify and Manage Fall Risk  Recent Flowsheet Documentation  Taken 11/5/2023 1820 by David  Liana R, RN  Safety Promotion/Fall Prevention: safety round/check completed  Taken 11/5/2023 1655 by Liana Hernandez RN  Safety Promotion/Fall Prevention: safety round/check completed  Taken 11/5/2023 1500 by Liana Hernandez RN  Safety Promotion/Fall Prevention: safety round/check completed  Taken 11/5/2023 1237 by Liana Hernandez RN  Safety Promotion/Fall Prevention: safety round/check completed  Taken 11/5/2023 1232 by Liana Hernandez RN  Safety Promotion/Fall Prevention: safety round/check completed  Taken 11/5/2023 1100 by Liana Hernandez RN  Safety Promotion/Fall Prevention: safety round/check completed  Taken 11/5/2023 0837 by Liana Hernandez RN  Safety Promotion/Fall Prevention: safety round/check completed  Taken 11/5/2023 0805 by Liana Hernandez RN  Safety Promotion/Fall Prevention: safety round/check completed  Taken 11/5/2023 0735 by Liana Hernandez RN  Safety Promotion/Fall Prevention: safety round/check completed  Intervention: Prevent and Manage VTE (Venous Thromboembolism) Risk  Recent Flowsheet Documentation  Taken 11/5/2023 0837 by Liana Hernandez RN  Range of Motion: active ROM (range of motion) encouraged  Goal: Optimal Comfort and Wellbeing  Outcome: Ongoing, Progressing  Intervention: Provide Person-Centered Care  Recent Flowsheet Documentation  Taken 11/5/2023 0837 by Liana Hernandez RN  Trust Relationship/Rapport:   care explained   choices provided   emotional support provided   empathic listening provided   questions answered   questions encouraged   reassurance provided   thoughts/feelings acknowledged  Goal: Readiness for Transition of Care  Outcome: Ongoing, Progressing

## 2023-11-06 LAB
ACHR BIND AB SER-SCNC: <0.03 NMOL/L (ref 0–0.24)
ACHR BLOCK AB SER-ACNC: 19 % (ref 0–25)
ACHR MOD AB SER QL FC: 3 % (ref 0–45)
ALBUMIN SERPL-MCNC: 3.6 G/DL (ref 3.5–5.2)
ALP SERPL-CCNC: 65 U/L (ref 39–117)
ALT SERPL W P-5'-P-CCNC: 16 U/L (ref 1–41)
ANION GAP SERPL CALCULATED.3IONS-SCNC: 8.7 MMOL/L (ref 5–15)
AST SERPL-CCNC: 13 U/L (ref 1–40)
BACTERIA SPEC AEROBE CULT: NORMAL
BACTERIA SPEC AEROBE CULT: NORMAL
BASOPHILS # BLD AUTO: 0.03 10*3/MM3 (ref 0–0.2)
BASOPHILS NFR BLD AUTO: 0.3 % (ref 0–1.5)
BILIRUB CONJ SERPL-MCNC: <0.2 MG/DL (ref 0–0.3)
BILIRUB INDIRECT SERPL-MCNC: NORMAL MG/DL
BILIRUB SERPL-MCNC: 0.4 MG/DL (ref 0–1.2)
BUN SERPL-MCNC: 11 MG/DL (ref 8–23)
BUN/CREAT SERPL: 14.9 (ref 7–25)
CALCIUM SPEC-SCNC: 9.6 MG/DL (ref 8.6–10.5)
CHLORIDE SERPL-SCNC: 98 MMOL/L (ref 98–107)
CO2 SERPL-SCNC: 30.3 MMOL/L (ref 22–29)
CREAT SERPL-MCNC: 0.74 MG/DL (ref 0.76–1.27)
DEPRECATED RDW RBC AUTO: 50.4 FL (ref 37–54)
EGFRCR SERPLBLD CKD-EPI 2021: 101.8 ML/MIN/1.73
EOSINOPHIL # BLD AUTO: 0.3 10*3/MM3 (ref 0–0.4)
EOSINOPHIL NFR BLD AUTO: 3.4 % (ref 0.3–6.2)
ERYTHROCYTE [DISTWIDTH] IN BLOOD BY AUTOMATED COUNT: 15 % (ref 12.3–15.4)
GLUCOSE BLDC GLUCOMTR-MCNC: 214 MG/DL (ref 70–99)
GLUCOSE BLDC GLUCOMTR-MCNC: 243 MG/DL (ref 70–99)
GLUCOSE BLDC GLUCOMTR-MCNC: 260 MG/DL (ref 70–99)
GLUCOSE BLDC GLUCOMTR-MCNC: 276 MG/DL (ref 70–99)
GLUCOSE SERPL-MCNC: 263 MG/DL (ref 65–99)
HCT VFR BLD AUTO: 47.6 % (ref 37.5–51)
HGB BLD-MCNC: 15.4 G/DL (ref 13–17.7)
IMM GRANULOCYTES # BLD AUTO: 0.04 10*3/MM3 (ref 0–0.05)
IMM GRANULOCYTES NFR BLD AUTO: 0.5 % (ref 0–0.5)
LYMPHOCYTES # BLD AUTO: 1.33 10*3/MM3 (ref 0.7–3.1)
LYMPHOCYTES NFR BLD AUTO: 15 % (ref 19.6–45.3)
MAGNESIUM SERPL-MCNC: 1.9 MG/DL (ref 1.6–2.4)
MCH RBC QN AUTO: 29.8 PG (ref 26.6–33)
MCHC RBC AUTO-ENTMCNC: 32.4 G/DL (ref 31.5–35.7)
MCV RBC AUTO: 92.1 FL (ref 79–97)
MONOCYTES # BLD AUTO: 0.67 10*3/MM3 (ref 0.1–0.9)
MONOCYTES NFR BLD AUTO: 7.6 % (ref 5–12)
NEUTROPHILS NFR BLD AUTO: 6.49 10*3/MM3 (ref 1.7–7)
NEUTROPHILS NFR BLD AUTO: 73.2 % (ref 42.7–76)
NRBC BLD AUTO-RTO: 0 /100 WBC (ref 0–0.2)
PHOSPHATE SERPL-MCNC: 3.2 MG/DL (ref 2.5–4.5)
PLATELET # BLD AUTO: 337 10*3/MM3 (ref 140–450)
PMV BLD AUTO: 10.5 FL (ref 6–12)
POTASSIUM SERPL-SCNC: 4.3 MMOL/L (ref 3.5–5.2)
PROT SERPL-MCNC: 7.5 G/DL (ref 6–8.5)
RBC # BLD AUTO: 5.17 10*6/MM3 (ref 4.14–5.8)
SODIUM SERPL-SCNC: 137 MMOL/L (ref 136–145)
WBC NRBC COR # BLD: 8.86 10*3/MM3 (ref 3.4–10.8)

## 2023-11-06 PROCEDURE — 84100 ASSAY OF PHOSPHORUS: CPT | Performed by: FAMILY MEDICINE

## 2023-11-06 PROCEDURE — 63710000001 INSULIN DETEMIR PER 5 UNITS: Performed by: PHYSICIAN ASSISTANT

## 2023-11-06 PROCEDURE — 63710000001 INSULIN LISPRO (HUMAN) PER 5 UNITS: Performed by: PHYSICIAN ASSISTANT

## 2023-11-06 PROCEDURE — 80048 BASIC METABOLIC PNL TOTAL CA: CPT | Performed by: FAMILY MEDICINE

## 2023-11-06 PROCEDURE — 99232 SBSQ HOSP IP/OBS MODERATE 35: CPT | Performed by: FAMILY MEDICINE

## 2023-11-06 PROCEDURE — 82948 REAGENT STRIP/BLOOD GLUCOSE: CPT

## 2023-11-06 PROCEDURE — 97110 THERAPEUTIC EXERCISES: CPT

## 2023-11-06 PROCEDURE — 85025 COMPLETE CBC W/AUTO DIFF WBC: CPT | Performed by: FAMILY MEDICINE

## 2023-11-06 PROCEDURE — 80076 HEPATIC FUNCTION PANEL: CPT | Performed by: FAMILY MEDICINE

## 2023-11-06 PROCEDURE — 83735 ASSAY OF MAGNESIUM: CPT | Performed by: FAMILY MEDICINE

## 2023-11-06 RX ORDER — INSULIN LISPRO 100 [IU]/ML
4 INJECTION, SOLUTION INTRAVENOUS; SUBCUTANEOUS
Status: DISCONTINUED | OUTPATIENT
Start: 2023-11-06 | End: 2023-11-07

## 2023-11-06 RX ADMIN — GABAPENTIN 600 MG: 300 CAPSULE ORAL at 16:39

## 2023-11-06 RX ADMIN — INSULIN LISPRO 4 UNITS: 100 INJECTION, SOLUTION INTRAVENOUS; SUBCUTANEOUS at 21:55

## 2023-11-06 RX ADMIN — INSULIN DETEMIR 10 UNITS: 100 INJECTION, SOLUTION SUBCUTANEOUS at 09:27

## 2023-11-06 RX ADMIN — INSULIN LISPRO 6 UNITS: 100 INJECTION, SOLUTION INTRAVENOUS; SUBCUTANEOUS at 17:31

## 2023-11-06 RX ADMIN — GABAPENTIN 300 MG: 300 CAPSULE ORAL at 16:39

## 2023-11-06 RX ADMIN — INSULIN LISPRO 4 UNITS: 100 INJECTION, SOLUTION INTRAVENOUS; SUBCUTANEOUS at 12:55

## 2023-11-06 RX ADMIN — DILTIAZEM HYDROCHLORIDE 240 MG: 240 CAPSULE, EXTENDED RELEASE ORAL at 09:26

## 2023-11-06 RX ADMIN — APIXABAN 5 MG: 5 TABLET, FILM COATED ORAL at 09:26

## 2023-11-06 RX ADMIN — INSULIN LISPRO 4 UNITS: 100 INJECTION, SOLUTION INTRAVENOUS; SUBCUTANEOUS at 09:28

## 2023-11-06 RX ADMIN — ALLOPURINOL 300 MG: 300 TABLET ORAL at 09:26

## 2023-11-06 RX ADMIN — GABAPENTIN 600 MG: 300 CAPSULE ORAL at 09:25

## 2023-11-06 RX ADMIN — Medication 10 ML: at 21:56

## 2023-11-06 RX ADMIN — INSULIN LISPRO 4 UNITS: 100 INJECTION, SOLUTION INTRAVENOUS; SUBCUTANEOUS at 17:31

## 2023-11-06 RX ADMIN — INSULIN LISPRO 6 UNITS: 100 INJECTION, SOLUTION INTRAVENOUS; SUBCUTANEOUS at 09:27

## 2023-11-06 RX ADMIN — INSULIN DETEMIR 10 UNITS: 100 INJECTION, SOLUTION SUBCUTANEOUS at 21:55

## 2023-11-06 RX ADMIN — DIGOXIN 250 MCG: 250 TABLET ORAL at 09:41

## 2023-11-06 RX ADMIN — Medication 10 ML: at 09:28

## 2023-11-06 RX ADMIN — APIXABAN 5 MG: 5 TABLET, FILM COATED ORAL at 21:56

## 2023-11-06 RX ADMIN — TRIAMCINOLONE ACETONIDE 1 APPLICATION: 1 OINTMENT TOPICAL at 09:32

## 2023-11-06 RX ADMIN — TRIAMCINOLONE ACETONIDE 1 APPLICATION: 1 OINTMENT TOPICAL at 21:57

## 2023-11-06 RX ADMIN — GABAPENTIN 300 MG: 300 CAPSULE ORAL at 09:25

## 2023-11-06 RX ADMIN — GABAPENTIN 300 MG: 300 CAPSULE ORAL at 21:56

## 2023-11-06 RX ADMIN — METOPROLOL SUCCINATE 100 MG: 50 TABLET, EXTENDED RELEASE ORAL at 09:25

## 2023-11-06 RX ADMIN — PYRIDOSTIGMINE BROMIDE 30 MG: 60 TABLET ORAL at 10:40

## 2023-11-06 RX ADMIN — FLUTICASONE PROPIONATE 1 SPRAY: 50 SPRAY, METERED NASAL at 09:29

## 2023-11-06 RX ADMIN — PYRIDOSTIGMINE BROMIDE 30 MG: 60 TABLET ORAL at 16:39

## 2023-11-06 RX ADMIN — PYRIDOSTIGMINE BROMIDE 30 MG: 60 TABLET ORAL at 21:56

## 2023-11-06 RX ADMIN — GABAPENTIN 600 MG: 300 CAPSULE ORAL at 21:56

## 2023-11-06 NOTE — THERAPY TREATMENT NOTE
Acute Care - Physical Therapy Treatment Note   [place]     Patient Name: Niall Mak  : 1960  MRN: [id number]  Today's Date: 2023      Visit Dx:     ICD-10-CM ICD-9-CM   1. Generalized weakness  R53.1 780.79   2. Impaired mobility and ADLs  Z74.09 V49.89    Z78.9    3. Difficulty walking  R26.2 719.7     Patient Active Problem List   Diagnosis    Sepsis    Atrial fibrillation with rapid ventricular response    6th nerve palsy    Weakness    Complicated UTI (urinary tract infection)     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     Past Surgical History:   Procedure Laterality Date    COLONOSCOPY      TONSILLECTOMY       PT Assessment (last 12 hours)       PT Evaluation and Treatment       Row Name 23 1143          Physical Therapy Time and Intention    Subjective Information complains of;weakness;fatigue;pain  -DK     Document Type therapy note (daily note)  -DK     Mode of Treatment individual therapy;physical therapy  -DK     Patient Effort good  -DK     Symptoms Noted During/After Treatment fatigue;increased pain  -DK     Comment Pt reports feeling very tired.  Had done OT not long before PT treatment.  Pt reports little rest overnight.  -DK       Row Name 23 1143          Pain    Pretreatment Pain Rating 2/10  -DK     Posttreatment Pain Rating 2/10  -DK     Pain Location generalized  -DK     Pain Location - back;abdomen  -DK     Pain Intervention(s) Distraction;Therapeutic presence  -DK       Row Name 23 1143          Cognition    Affect/Mental Status (Cognition) WNL  -DK     Orientation Status (Cognition) oriented x 4  -DK     Follows Commands (Cognition) WNL  -DK     Cognitive Function WNL  -DK     Personal Safety Interventions gait belt;nonskid  shoes/slippers when out of bed;supervised activity  -       Row Name 11/06/23 1143          Motor Skills    Motor Skills --  therapeutic exercises  -     Coordination WFL  -     Therapeutic Exercise hip;knee;ankle  -     Additional Documentation --  Pt declined transfers this session due to c/o fatigue.  -       Row Name 11/06/23 1143          Hip (Therapeutic Exercise)    Hip (Therapeutic Exercise) AAROM (active assistive range of motion)  -     Hip AAROM (Therapeutic Exercise) bilateral;flexion;extension;aBduction;aDduction;supine;10 repetitions;2 sets  -       Row Name 11/06/23 1143          Knee (Therapeutic Exercise)    Knee (Therapeutic Exercise) AAROM (active assistive range of motion)  -     Knee AAROM (Therapeutic Exercise) bilateral;flexion;extension;supine;10 repetitions;2 sets  -       Row Name 11/06/23 1143          Ankle (Therapeutic Exercise)    Ankle (Therapeutic Exercise) AAROM (active assistive range of motion)  -     Ankle AAROM (Therapeutic Exercise) bilateral;dorsiflexion;plantarflexion;supine;10 repetitions;2 sets  -       Row Name             Wound 11/01/23 1802 Right anterior hip MASD (Moisture associated skin damage)    Wound - Properties Group Placement Date: 11/01/23  -KM Placement Time: 1802  -KM Present on Original Admission: Y  -KM Side: Right  -KM Orientation: anterior  -KM Location: hip  -KM Primary Wound Type: MASD  -KM    Retired Wound - Properties Group Placement Date: 11/01/23  -KM Placement Time: 1802  -KM Present on Original Admission: Y  -KM Side: Right  -KM Orientation: anterior  -KM Location: hip  -KM Primary Wound Type: MASD  -KM    Retired Wound - Properties Group Date first assessed: 11/01/23  -KM Time first assessed: 1802  -KM Present on Original Admission: Y  -KM Side: Right  -KM Location: hip  -KM Primary Wound Type: MASD  -KM      Row Name             Wound 11/01/23 1806    Wound - Properties Group Placement Date: 11/01/23  -KM Placement Time:  1806  -KM    Retired Wound - Properties Group Placement Date: 11/01/23  -KM Placement Time: 1806  -KM    Retired Wound - Properties Group Date first assessed: 11/01/23  -KM Time first assessed: 1806  -KM      Row Name             Wound 07/26/21 0400 medial abdomen Blisters    Wound - Properties Group Placement Date: 07/26/21  -DG Placement Time: 0400  -DG Present on Original Admission: Y  -DG Orientation: medial  -DG Location: abdomen  -DG Primary Wound Type: Blisters  -DG    Retired Wound - Properties Group Placement Date: 07/26/21  -DG Placement Time: 0400  -DG Present on Original Admission: Y  -DG Orientation: medial  -DG Location: abdomen  -DG Primary Wound Type: Blisters  -DG    Retired Wound - Properties Group Date first assessed: 07/26/21  -DG Time first assessed: 0400  -DG Present on Original Admission: Y  -DG Location: abdomen  -DG Primary Wound Type: Blisters  -DG      Row Name 11/06/23 1143          Plan of Care Review    Plan of Care Reviewed With patient  -DK     Progress no change  -DK       Row Name 11/06/23 1143          Positioning and Restraints    Pre-Treatment Position in bed  -DK     Post Treatment Position bed  -DK     In Bed supine;call light within reach;encouraged to call for assist;exit alarm on;side rails up x3;legs elevated  -DK       Row Name 11/06/23 1143          Therapy Assessment/Plan (PT)    Rehab Potential (PT) fair, will monitor progress closely  -DK     Criteria for Skilled Interventions Met (PT) skilled treatment is necessary  -DK     Therapy Frequency (PT) daily  -DK     Problem List (PT) balance;mobility;strength;vision  -DK     Activity Limitations Related to Problem List (PT) unable to ambulate safely;unable to transfer safely  -DK       Row Name 11/06/23 1143          Progress Summary (PT)    Progress Toward Functional Goals (PT) progress toward functional goals is fair  -DK               User Key  (r) = Recorded By, (t) = Taken By, (c) = Cosigned By      Initials Name  Provider Type    Tiera Montelongo, RN Registered Nurse    Nely Richard, RN Registered Nurse    Nilda Aggarwal PTA Physical Therapist Assistant                      PT Recommendation and Plan  Planned Therapy Interventions (PT): balance training, bed mobility training, gait training, strengthening, transfer training  Therapy Frequency (PT): daily  Progress Summary (PT)  Progress Toward Functional Goals (PT): progress toward functional goals is fair  Plan of Care Reviewed With: patient  Progress: no change   Outcome Measures       Row Name 11/06/23 1142 11/05/23 1220          How much help from another person do you currently need...    Turning from your back to your side while in flat bed without using bedrails? 4  -DK 4  -DK     Moving from lying on back to sitting on the side of a flat bed without bedrails? 4  -DK 4  -DK     Moving to and from a bed to a chair (including a wheelchair)? 2  -DK 2  -DK     Standing up from a chair using your arms (e.g., wheelchair, bedside chair)? 3  -DK 3  -DK     Climbing 3-5 steps with a railing? 2  -DK 2  -DK     To walk in hospital room? 2  -DK 2  -DK     AM-PAC 6 Clicks Score (PT) 17  -DK 17  -DK     Highest level of mobility 5 --> Static standing  -DK 5 --> Static standing  -DK        Functional Assessment    Outcome Measure Options AM-PAC 6 Clicks Basic Mobility (PT)  -DK AM-PAC 6 Clicks Basic Mobility (PT)  -DK               User Key  (r) = Recorded By, (t) = Taken By, (c) = Cosigned By      Initials Name Provider Type    Nilda Aggarwal PTA Physical Therapist Assistant                     Time Calculation:    PT Charges       Row Name 11/06/23 1145             Time Calculation    PT Received On 11/06/23  -DK      PT Goal Re-Cert Due Date 11/11/23  -DK         Timed Charges    11540 - PT Therapeutic Exercise Minutes 14  -DK      71667 - PT Therapeutic Activity Minutes 3  -DK         Total Minutes    Timed Charges Total Minutes 17  -DK       Total Minutes 17   -DK                User Key  (r) = Recorded By, (t) = Taken By, (c) = Cosigned By      Initials Name Provider Type    Nilda Aggarwal PTA Physical Therapist Assistant                  Therapy Charges for Today       Code Description Service Date Service Provider Modifiers Qty    81871047186 HC PT THER PROC EA 15 MIN 11/5/2023 Nilda Mattson, ENA GP 1    61715063972 HC PT THERAPEUTIC ACT EA 15 MIN 11/5/2023 Nilda Mattson, ENA GP 1    98262314059 HC PT THER PROC EA 15 MIN 11/6/2023 Nilda Mattson, ENA GP 1            PT G-Codes  Outcome Measure Options: AM-PAC 6 Clicks Basic Mobility (PT)  AM-PAC 6 Clicks Score (PT): 17  AM-PAC 6 Clicks Score (OT): 15    Nilda Mattsno PTA  11/6/2023

## 2023-11-06 NOTE — PLAN OF CARE
Goal Outcome Evaluation:   VSS, BP elevated. Pain controlled, AAOx4, no significant changes.

## 2023-11-06 NOTE — THERAPY TREATMENT NOTE
Patient Name: Niall Mak  : 1960    MRN: [id number]                              Today's Date: 2023       Admit Date: 2023    Visit Dx:     ICD-10-CM ICD-9-CM   1. Generalized weakness  R53.1 780.79   2. Impaired mobility and ADLs  Z74.09 V49.89    Z78.9    3. Difficulty walking  R26.2 719.7     Patient Active Problem List   Diagnosis    Sepsis    Atrial fibrillation with rapid ventricular response    6th nerve palsy    Weakness    Complicated UTI (urinary tract infection)     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     Past Surgical History:   Procedure Laterality Date    COLONOSCOPY      TONSILLECTOMY        General Information       Row Name 23 1258          OT Time and Intention    Document Type therapy note (daily note)  -PG     Mode of Treatment individual therapy;occupational therapy  -PG               User Key  (r) = Recorded By, (t) = Taken By, (c) = Cosigned By      Initials Name Provider Type    PG Enrrique Catherine OT Occupational Therapist                     Mobility/ADL's    No documentation.                  Obj/Interventions       Row Name 23 1258          Shoulder (Therapeutic Exercise)    Shoulder (Therapeutic Exercise) strengthening exercise  -PG     Shoulder Strengthening (Therapeutic Exercise) resistance band;20 repititions  -PG       Row Name 23 1258          Elbow/Forearm (Therapeutic Exercise)    Elbow/Forearm (Therapeutic Exercise) strengthening exercise  -PG     Elbow/Forearm Strengthening (Therapeutic Exercise) 20 repititions;resistance band  -PG       Row Name 23 1258          Motor Skills    Therapeutic Exercise shoulder;elbow/forearm  -PG               User Key  (r) = Recorded By, (t) = Taken By, (c) = Cosigned By       Initials Name Provider Type    PG Enrrique Catherine OT Occupational Therapist                   Goals/Plan    No documentation.                  Clinical Impression       Row Name 11/06/23 1259          Plan of Care Review    Progress no change  -PG               User Key  (r) = Recorded By, (t) = Taken By, (c) = Cosigned By      Initials Name Provider Type    PG Enrrique Catherine OT Occupational Therapist                   Outcome Measures       Row Name 11/06/23 1300          How much help from another is currently needed...    Putting on and taking off regular lower body clothing? 2  -PG     Bathing (including washing, rinsing, and drying) 2  -PG     Toileting (which includes using toilet bed pan or urinal) 1  -PG     Putting on and taking off regular upper body clothing 3  -PG     Taking care of personal grooming (such as brushing teeth) 4  -PG     Eating meals 4  -PG     AM-PAC 6 Clicks Score (OT) 16  -PG       Row Name 11/06/23 1142          How much help from another person do you currently need...    Turning from your back to your side while in flat bed without using bedrails? 4  -DK     Moving from lying on back to sitting on the side of a flat bed without bedrails? 4  -DK     Moving to and from a bed to a chair (including a wheelchair)? 2  -DK     Standing up from a chair using your arms (e.g., wheelchair, bedside chair)? 3  -DK     Climbing 3-5 steps with a railing? 2  -DK     To walk in hospital room? 2  -DK     AM-PAC 6 Clicks Score (PT) 17  -DK     Highest level of mobility 5 --> Static standing  -DK       Row Name 11/06/23 1300 11/06/23 1142       Functional Assessment    Outcome Measure Options AM-PAC 6 Clicks Daily Activity (OT);Optimal Instrument  -PG AM-PAC 6 Clicks Basic Mobility (PT)  -DK      Row Name 11/06/23 1300          Optimal Instrument    Optimal Instrument Optimal - 3  -PG     Bending/Stooping 4  -PG     Standing 4  -PG     Reaching 2  -PG               User Key  (r) = Recorded By, (t) =  Taken By, (c) = Cosigned By      Initials Name Provider Type    Nilda Aggarwal, ENA Physical Therapist Assistant    PG Enrrique Catherine OT Occupational Therapist                    Occupational Therapy Education       Title: PT OT SLP Therapies (Done)       Topic: Occupational Therapy (Done)       Point: ADL training (Done)       Description:   Instruct learner(s) on proper safety adaptation and remediation techniques during self care or transfers.   Instruct in proper use of assistive devices.                  Learning Progress Summary             Patient Acceptance, E,D, DU by PG at 11/2/2023 1005                         Point: Home exercise program (Done)       Description:   Instruct learner(s) on appropriate technique for monitoring, assisting and/or progressing therapeutic exercises/activities.                  Learning Progress Summary             Patient Acceptance, E,D, DU by PG at 11/2/2023 1005                         Point: Precautions (Done)       Description:   Instruct learner(s) on prescribed precautions during self-care and functional transfers.                  Learning Progress Summary             Patient Acceptance, E,D, DU by PG at 11/2/2023 1005                         Point: Body mechanics (Done)       Description:   Instruct learner(s) on proper positioning and spine alignment during self-care, functional mobility activities and/or exercises.                  Learning Progress Summary             Patient Acceptance, E,D, DU by PG at 11/2/2023 1005                                         User Key       Initials Effective Dates Name Provider Type Discipline    PG 06/16/21 -  Enrrique Catherine OT Occupational Therapist OT                  OT Recommendation and Plan  Planned Therapy Interventions (OT): activity tolerance training, BADL retraining, strengthening exercise, transfer/mobility retraining, patient/caregiver education/training, occupation/activity based interventions  Therapy Frequency  (OT): 5 times/wk  Plan of Care Review  Plan of Care Reviewed With: patient  Progress: no change  Outcome Evaluation: Patient presents with limitations affecting strength, activity tolerance, and balance impacting patient's ability to return home safely and independently.  The skills of a therapist will be required to safely and effectively implement the following treatment plan to restore maximal level of function     Time Calculation:   Evaluation Complexity (OT)  Review Occupational Profile/Medical/Therapy History Complexity: brief/low complexity  Assessment, Occupational Performance/Identification of Deficit Complexity: 3-5 performance deficits  Clinical Decision Making Complexity (OT): problem focused assessment/low complexity  Overall Complexity of Evaluation (OT): low complexity     Time Calculation- OT       Row Name 11/06/23 1300             Time Calculation- OT    OT Received On 11/06/23  -PG      OT Goal Re-Cert Due Date 11/11/23  -PG         Timed Charges    50931 - OT Therapeutic Exercise Minutes 10  -PG         Total Minutes    Timed Charges Total Minutes 10  -PG       Total Minutes 10  -PG                User Key  (r) = Recorded By, (t) = Taken By, (c) = Cosigned By      Initials Name Provider Type    PG Enrrique Catherine OT Occupational Therapist                  Therapy Charges for Today       Code Description Service Date Service Provider Modifiers Qty    [id number] HC OT THER PROC EA 15 MIN 11/6/2023 Enrrique Catherine OT GO 1                 Enrrique Catherine OT  11/6/2023

## 2023-11-06 NOTE — PLAN OF CARE
Goal Outcome Evaluation:  Plan of Care Reviewed With: patient   Pt vss. Pt required supplemental insulin at meal times throughout shift. Call light and belongings placed within reach, continuing with plan of care.

## 2023-11-06 NOTE — PROGRESS NOTES
[facility]   Hospitalist Progress Note       Patient Name: Niall Mak  : 1960  MRN: [id number]  Primary Care Physician: Shai Azar PA  Date of admission: 2023  Today's Date: 2023  Room / Bed:   3013/1  Subjective   Chief Complaint: Weakness.  Unable to ambulate around the house.    Summary:  Niall Mak is a 63 y.o. male with severe morbid obesity presents with unable to ambulate around the house/general weakness.  He was recently discharged home from the hospital about 3 days ago.  At that time rehab was recommended but he refused.  Since going home he has not gotten up out of bed.  He was brought back to the ED due to inability to ambulate or get out of bed. In the ED he has no acute findings but is unable to ambulate.       Interval Followup: 2023  Feels tired today  Was able to stand for 5 to 10 minutes  Remains very deconditioned and weak in general.  Discussed rehab.  Citrobacter noted/urine cultures  Urinary issues  BM yesterday  Glucose could be better controlled (Levemir 10 twice daily, Humalog 4 at mealtime)        REVIEW OF SYSTEMS:   Weakness.  Double vision.  Objective   Temp:  [97.5 °F (36.4 °C)-98.2 °F (36.8 °C)] 98.1 °F (36.7 °C)  Heart Rate:  [62-93] 75  Resp:  [18-20] 18  BP: (144-175)/(65-93) 152/89  PHYSICAL EXAM   CON: WN. WD. NAD.  Obese with large pannus.  NECK:  No thyromegaly. No stridor.   RESP:  CTA. No wheezes. No crackles.  No work of breathing or tachypnea.   CV:  Rhythm regular. Rate WNL. No murmur noted.  No edema.  GI:  Soft and nontender. Nondistended.    EXT: Peripheral pulses intact.  No cyanosis.  PSYCH:  Alert. Oriented. Normal affect and mood.  NEURO:  No dysarthria or aphasia. No unilateral weakness or paresthesia.  SKIN: + Chronic venous stasis changes or varicosities.  No cellulitis  Results from last 7 days   Lab Units 23  0441 23  0502 23  0453 23  0518 23  1410   WBC 10*3/mm3 8.86 9.24 9.23  Insurance called for the pt in regards to a PA they need a medical reasoning for the formulary on the flex pin humalog otherwise this will have to be generic     Please call Mary [facility] Wolrey   Can send a prescription to pharmacy 8.36 9.68   HEMOGLOBIN g/dL 15.4 14.9 14.5 13.3 14.2   HEMATOCRIT % 47.6 46.3 46.3 41.9 44.1   PLATELETS 10*3/mm3 337 347 331 299 315     Results from last 7 days   Lab Units 11/06/23  0441 11/05/23  0502 11/03/23  0453 11/02/23  0518 11/01/23  1410   SODIUM mmol/L 137 135* 137 136 140   POTASSIUM mmol/L 4.3 4.0 4.2 3.6 4.2   CO2 mmol/L 30.3* 29.4* 30.8* 27.3 28.1   CHLORIDE mmol/L 98 97* 98 101 101   ANION GAP mmol/L 8.7 8.6 8.2 7.7 10.9   BUN mg/dL 11 10 12 11 10   CREATININE mg/dL 0.74* 0.73* 0.92 0.75* 0.87   GLUCOSE mg/dL 263* 270* 251* 216* 256*           COMPLEXITY OF DATA / DECISION MAKING     []  Moderate: One acute illness or mild exacerbation of chronic or 2 stable chronic or tx side effects   []  High:  Severe acute illness or severe exacerbation of chronic - potential for major debility / life threatening         I have personally reviewed the results from the time of this admission to 11/6/2023 11:02 EST:  []  Laboratory:  []  Microbiology: []  Radiology:  []  Telemetry:   []  Cardiology/Vascular:  []  Pathology:  []  Prior external records:  []  Independent historian provided additional details:      []  Discussed case with specialists:    []  Independent interpretation of ECG/Imaging etc:             []  Moderate: Rx management, low risk surgery, suboptimal social situation   []  High:  Rx with close monitoring for toxicity, mod-high risk surgery, DNR decision, Comfort initiated, IV pain meds    Assessment / Plan   Assessment:    Generalized weakness/unable to walk  Citrobacter UTI  Morbid obesity with BMI 55 (214 kg)  History of chronic atrial fibrillation  History of diastolic CHF  DM with neuropathy  Chronic venous stasis changes lower extremities  Horizontal diplopia, per neurology 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  Humalog 4 units at mealtime added.  Also SSI.   Up in chair to mobilize.  Rehab referrals in progress  Continue to  mobilize.  Continue PT/OT.  Gentamicin for UTI based on cultures/sensitivities  Eyepatch for diplopia  Daily PT and OT   for rehab  Continue Eliquis  Recent MRI 10/29/23 shows no evidence of ischemia or mass.  Nonspecific chronic changes  Recent noncontrasted CT head 10/29/23 shows no intracranial abnormalities    Discussed plan with RN.  DVT prophylaxis:  Medical and mechanical DVT prophylaxis orders are present.  CODE STATUS:      Code Status (Patient has no pulse and is not breathing): CPR (Attempt to Resuscitate)  Medical Interventions (Patient has pulse or is breathing): Full Support         Attending documentation:  I reviewed the above documentation and independently reviewed and rounded and evaluated the patient and discussed the care plan with MICHOACANO Santos PA-C, I agree with his findings and plan as documented, what I have added to the care plan and modified is as follows in my documentation and my medical decision making; 63-year-old morbidly obese male hospitalized on 11/1/2023 with weakness and difficulty ambulating, at baseline ambulates with assistive device, lives independently.  Unable to get out of bed, work-up thus far revealing patient had symptoms of UTI, with urinalysis grossly positive for UTI, urine culture growing gram-negative bacilli turned out to be multidrug-resistant Citrobacter, gentamicin ordered, treated accordingly, diarrhea, enteric panel and C. difficile negative.  Interval follow-up: Seen and examined laying in bed, no acute distress, no acute major overnight events, is still weak and fatigued, was able to stand for a few minutes yesterday, wearing a right eyepatch for his double vision, potassium 4.3, creatinine 0.74, blood sugar in the 200 range, Levemir dose adjusted to twice daily, review of systems obtained, all systems reviewed and negative except generalized weakness, generalized fatigue, double vision without wearing eye patch.  Vitals reviewed, labs reviewed  on physical exam morbidly obese male with large pannus sitting in the bedside chair, regular rate rhythm, diminished breath sounds throughout, soft nontender abdomen, lower extremity edema with chronic venous stasis changes.  Assessment as above, plan, status post single dose of gentamicin IV for treatment of symptomatic UTI in male, watch for urinary retention and/or symptoms of further dysuria, start insulin, Levemir 10 units twice daily, continue insulin sliding scale coverage, continued home medications accordingly, out of bed to chair a.m. labs, PT/OT, full code, DVT prophylaxis with Eliquis though at his size, effectiveness of Eliquis is questionable, discussed with social work to start precertification process for rehab, clinical course to dictate further management, discussed with nurse at the bedside.  More than 65 % of the time of this patient's encounter was performed by me, this included face-to-face time, planning and coordinating, medical decision making and critical thinking personally done by me.  -AVBMD    Electronically signed by Kevin Frye MD, 11/06/23, 3:52 PM EST.  Portions of this documentation were transcribed electronically from a voice recognition software.  I confirm all data accurately represents the service(s) I performed at today's visit.

## 2023-11-07 LAB
ALBUMIN SERPL-MCNC: 3.6 G/DL (ref 3.5–5.2)
ALP SERPL-CCNC: 67 U/L (ref 39–117)
ALT SERPL W P-5'-P-CCNC: 18 U/L (ref 1–41)
ANION GAP SERPL CALCULATED.3IONS-SCNC: 8 MMOL/L (ref 5–15)
AST SERPL-CCNC: 15 U/L (ref 1–40)
BASOPHILS # BLD AUTO: 0.06 10*3/MM3 (ref 0–0.2)
BASOPHILS NFR BLD AUTO: 0.6 % (ref 0–1.5)
BILIRUB CONJ SERPL-MCNC: <0.2 MG/DL (ref 0–0.3)
BILIRUB INDIRECT SERPL-MCNC: NORMAL MG/DL
BILIRUB SERPL-MCNC: 0.5 MG/DL (ref 0–1.2)
BUN SERPL-MCNC: 12 MG/DL (ref 8–23)
BUN/CREAT SERPL: 14.6 (ref 7–25)
CALCIUM SPEC-SCNC: 9.7 MG/DL (ref 8.6–10.5)
CHLORIDE SERPL-SCNC: 95 MMOL/L (ref 98–107)
CO2 SERPL-SCNC: 28 MMOL/L (ref 22–29)
CREAT SERPL-MCNC: 0.82 MG/DL (ref 0.76–1.27)
DEPRECATED RDW RBC AUTO: 49.4 FL (ref 37–54)
EGFRCR SERPLBLD CKD-EPI 2021: 98.7 ML/MIN/1.73
EOSINOPHIL # BLD AUTO: 0.3 10*3/MM3 (ref 0–0.4)
EOSINOPHIL NFR BLD AUTO: 3.1 % (ref 0.3–6.2)
ERYTHROCYTE [DISTWIDTH] IN BLOOD BY AUTOMATED COUNT: 14.9 % (ref 12.3–15.4)
GLUCOSE BLDC GLUCOMTR-MCNC: 223 MG/DL (ref 70–99)
GLUCOSE BLDC GLUCOMTR-MCNC: 248 MG/DL (ref 70–99)
GLUCOSE BLDC GLUCOMTR-MCNC: 290 MG/DL (ref 70–99)
GLUCOSE BLDC GLUCOMTR-MCNC: 308 MG/DL (ref 70–99)
GLUCOSE SERPL-MCNC: 254 MG/DL (ref 65–99)
HCT VFR BLD AUTO: 46.7 % (ref 37.5–51)
HGB BLD-MCNC: 15 G/DL (ref 13–17.7)
IMM GRANULOCYTES # BLD AUTO: 0.04 10*3/MM3 (ref 0–0.05)
IMM GRANULOCYTES NFR BLD AUTO: 0.4 % (ref 0–0.5)
LYMPHOCYTES # BLD AUTO: 1.32 10*3/MM3 (ref 0.7–3.1)
LYMPHOCYTES NFR BLD AUTO: 13.7 % (ref 19.6–45.3)
MAGNESIUM SERPL-MCNC: 1.8 MG/DL (ref 1.6–2.4)
MCH RBC QN AUTO: 29.2 PG (ref 26.6–33)
MCHC RBC AUTO-ENTMCNC: 32.1 G/DL (ref 31.5–35.7)
MCV RBC AUTO: 91 FL (ref 79–97)
MONOCYTES # BLD AUTO: 0.74 10*3/MM3 (ref 0.1–0.9)
MONOCYTES NFR BLD AUTO: 7.7 % (ref 5–12)
NEUTROPHILS NFR BLD AUTO: 7.19 10*3/MM3 (ref 1.7–7)
NEUTROPHILS NFR BLD AUTO: 74.5 % (ref 42.7–76)
NRBC BLD AUTO-RTO: 0 /100 WBC (ref 0–0.2)
PHOSPHATE SERPL-MCNC: 3.5 MG/DL (ref 2.5–4.5)
PLATELET # BLD AUTO: 341 10*3/MM3 (ref 140–450)
PMV BLD AUTO: 10.5 FL (ref 6–12)
POTASSIUM SERPL-SCNC: 4.1 MMOL/L (ref 3.5–5.2)
PROT SERPL-MCNC: 7.5 G/DL (ref 6–8.5)
RBC # BLD AUTO: 5.13 10*6/MM3 (ref 4.14–5.8)
SODIUM SERPL-SCNC: 131 MMOL/L (ref 136–145)
WBC NRBC COR # BLD: 9.65 10*3/MM3 (ref 3.4–10.8)

## 2023-11-07 PROCEDURE — 63710000001 INSULIN LISPRO (HUMAN) PER 5 UNITS: Performed by: PHYSICIAN ASSISTANT

## 2023-11-07 PROCEDURE — 85025 COMPLETE CBC W/AUTO DIFF WBC: CPT | Performed by: FAMILY MEDICINE

## 2023-11-07 PROCEDURE — 80048 BASIC METABOLIC PNL TOTAL CA: CPT | Performed by: FAMILY MEDICINE

## 2023-11-07 PROCEDURE — 84100 ASSAY OF PHOSPHORUS: CPT | Performed by: FAMILY MEDICINE

## 2023-11-07 PROCEDURE — 80076 HEPATIC FUNCTION PANEL: CPT | Performed by: FAMILY MEDICINE

## 2023-11-07 PROCEDURE — 83735 ASSAY OF MAGNESIUM: CPT | Performed by: FAMILY MEDICINE

## 2023-11-07 PROCEDURE — 97110 THERAPEUTIC EXERCISES: CPT

## 2023-11-07 PROCEDURE — 99232 SBSQ HOSP IP/OBS MODERATE 35: CPT | Performed by: INTERNAL MEDICINE

## 2023-11-07 PROCEDURE — 63710000001 INSULIN DETEMIR PER 5 UNITS: Performed by: PHYSICIAN ASSISTANT

## 2023-11-07 PROCEDURE — 82948 REAGENT STRIP/BLOOD GLUCOSE: CPT

## 2023-11-07 PROCEDURE — 97530 THERAPEUTIC ACTIVITIES: CPT

## 2023-11-07 RX ORDER — INSULIN LISPRO 100 [IU]/ML
6 INJECTION, SOLUTION INTRAVENOUS; SUBCUTANEOUS
Status: DISCONTINUED | OUTPATIENT
Start: 2023-11-07 | End: 2023-11-08 | Stop reason: HOSPADM

## 2023-11-07 RX ADMIN — TRIAMCINOLONE ACETONIDE 1 APPLICATION: 1 OINTMENT TOPICAL at 21:51

## 2023-11-07 RX ADMIN — INSULIN DETEMIR 15 UNITS: 100 INJECTION, SOLUTION SUBCUTANEOUS at 21:47

## 2023-11-07 RX ADMIN — GABAPENTIN 600 MG: 300 CAPSULE ORAL at 09:11

## 2023-11-07 RX ADMIN — DIGOXIN 250 MCG: 250 TABLET ORAL at 09:11

## 2023-11-07 RX ADMIN — INSULIN LISPRO 4 UNITS: 100 INJECTION, SOLUTION INTRAVENOUS; SUBCUTANEOUS at 17:14

## 2023-11-07 RX ADMIN — Medication 10 ML: at 21:48

## 2023-11-07 RX ADMIN — INSULIN DETEMIR 10 UNITS: 100 INJECTION, SOLUTION SUBCUTANEOUS at 09:09

## 2023-11-07 RX ADMIN — INSULIN LISPRO 4 UNITS: 100 INJECTION, SOLUTION INTRAVENOUS; SUBCUTANEOUS at 09:19

## 2023-11-07 RX ADMIN — GABAPENTIN 300 MG: 300 CAPSULE ORAL at 09:10

## 2023-11-07 RX ADMIN — FLUTICASONE PROPIONATE 1 SPRAY: 50 SPRAY, METERED NASAL at 09:12

## 2023-11-07 RX ADMIN — PYRIDOSTIGMINE BROMIDE 30 MG: 60 TABLET ORAL at 09:11

## 2023-11-07 RX ADMIN — APIXABAN 5 MG: 5 TABLET, FILM COATED ORAL at 09:10

## 2023-11-07 RX ADMIN — INSULIN LISPRO 6 UNITS: 100 INJECTION, SOLUTION INTRAVENOUS; SUBCUTANEOUS at 21:47

## 2023-11-07 RX ADMIN — INSULIN LISPRO 4 UNITS: 100 INJECTION, SOLUTION INTRAVENOUS; SUBCUTANEOUS at 09:09

## 2023-11-07 RX ADMIN — GABAPENTIN 300 MG: 300 CAPSULE ORAL at 17:14

## 2023-11-07 RX ADMIN — GABAPENTIN 600 MG: 300 CAPSULE ORAL at 17:14

## 2023-11-07 RX ADMIN — METOPROLOL SUCCINATE 100 MG: 50 TABLET, EXTENDED RELEASE ORAL at 09:10

## 2023-11-07 RX ADMIN — INSULIN LISPRO 6 UNITS: 100 INJECTION, SOLUTION INTRAVENOUS; SUBCUTANEOUS at 17:14

## 2023-11-07 RX ADMIN — Medication 10 ML: at 09:11

## 2023-11-07 RX ADMIN — PYRIDOSTIGMINE BROMIDE 30 MG: 60 TABLET ORAL at 21:46

## 2023-11-07 RX ADMIN — PYRIDOSTIGMINE BROMIDE 30 MG: 60 TABLET ORAL at 17:13

## 2023-11-07 RX ADMIN — GABAPENTIN 600 MG: 300 CAPSULE ORAL at 21:47

## 2023-11-07 RX ADMIN — INSULIN LISPRO 7 UNITS: 100 INJECTION, SOLUTION INTRAVENOUS; SUBCUTANEOUS at 12:36

## 2023-11-07 RX ADMIN — TRIAMCINOLONE ACETONIDE 1 APPLICATION: 1 OINTMENT TOPICAL at 12:38

## 2023-11-07 RX ADMIN — GABAPENTIN 300 MG: 300 CAPSULE ORAL at 21:47

## 2023-11-07 RX ADMIN — DILTIAZEM HYDROCHLORIDE 240 MG: 240 CAPSULE, EXTENDED RELEASE ORAL at 09:10

## 2023-11-07 RX ADMIN — INSULIN LISPRO 6 UNITS: 100 INJECTION, SOLUTION INTRAVENOUS; SUBCUTANEOUS at 12:37

## 2023-11-07 RX ADMIN — ALLOPURINOL 300 MG: 300 TABLET ORAL at 09:11

## 2023-11-07 NOTE — PLAN OF CARE
Goal Outcome Evaluation:  Plan of Care Reviewed With: patient        Progress: no change  Outcome Evaluation: Pt vss. Pt up in the chair for 4 hours this shift. No c/o pain. Continue plan of care

## 2023-11-07 NOTE — PROGRESS NOTES
[facility]   Hospitalist Progress Note       Patient Name: Niall Mak  : 1960  MRN: [id number]  Primary Care Physician: Shai Azar PA  Date of admission: 2023  Today's Date: 2023  Room / Bed:   3013/1  Subjective   Chief Complaint: Weakness.  Unable to ambulate around the house.    Summary:  Niall Mak is a 63 y.o. male with severe morbid obesity presents with unable to ambulate around the house/general weakness.  He was recently discharged home from the hospital about 3 days ago.  At that time rehab was recommended but he refused.  Since going home he has not gotten up out of bed.  He was brought back to the ED due to inability to ambulate or get out of bed. In the ED he has no acute findings but is unable to ambulate.       Interval Followup: 2023    Had a better nights rest.  Feels stronger.  Feels as if he is slowly improving.    Double vision slowly improving   Was able to stand for 5 to 10 minutes  Still very deconditioned and weak in general.  Discussed pending rehab.  Citrobacter noted/urine cultures  Had BM yesterday  Glucose could be better controlled     REVIEW OF SYSTEMS:   Weakness.  Double vision.  Objective   Temp:  [97.3 °F (36.3 °C)-97.9 °F (36.6 °C)] 97.3 °F (36.3 °C)  Heart Rate:  [61-95] 70  Resp:  [16-20] 16  BP: (133-186)/(66-88) 159/86  PHYSICAL EXAM   CON: WN. WD. NAD.  Obese with large pannus.  NECK:  No thyromegaly. No stridor.   RESP:  CTA. No wheezes. No crackles.  No work of breathing or tachypnea.   CV:  Rhythm regular. Rate WNL. No murmur noted.  No edema.  GI:  Soft and nontender. Nondistended.    EXT: Peripheral pulses intact.  No cyanosis.  PSYCH:  Alert. Oriented. Normal affect and mood.  NEURO:  No dysarthria or aphasia. No unilateral weakness or paresthesia.  SKIN: + Chronic venous stasis changes or varicosities.  No cellulitis  Results from last 7 days   Lab Units 23  0512 23  0441 23  0502 23  0450  11/02/23  0518 11/01/23  1410   WBC 10*3/mm3 9.65 8.86 9.24 9.23 8.36 9.68   HEMOGLOBIN g/dL 15.0 15.4 14.9 14.5 13.3 14.2   HEMATOCRIT % 46.7 47.6 46.3 46.3 41.9 44.1   PLATELETS 10*3/mm3 341 337 347 331 299 315     Results from last 7 days   Lab Units 11/07/23  0512 11/06/23  0441 11/05/23  0502 11/03/23  0453 11/02/23  0518 11/01/23  1410   SODIUM mmol/L 131* 137 135* 137 136 140   POTASSIUM mmol/L 4.1 4.3 4.0 4.2 3.6 4.2   CO2 mmol/L 28.0 30.3* 29.4* 30.8* 27.3 28.1   CHLORIDE mmol/L 95* 98 97* 98 101 101   ANION GAP mmol/L 8.0 8.7 8.6 8.2 7.7 10.9   BUN mg/dL 12 11 10 12 11 10   CREATININE mg/dL 0.82 0.74* 0.73* 0.92 0.75* 0.87   GLUCOSE mg/dL 254* 263* 270* 251* 216* 256*           COMPLEXITY OF DATA / DECISION MAKING     []  Moderate: One acute illness or mild exacerbation of chronic or 2 stable chronic or tx side effects   []  High:  Severe acute illness or severe exacerbation of chronic - potential for major debility / life threatening         I have personally reviewed the results from the time of this admission to 11/7/2023 10:16 EST:  []  Laboratory:  []  Microbiology: []  Radiology:  []  Telemetry:   []  Cardiology/Vascular:  []  Pathology:  []  Prior external records:  []  Independent historian provided additional details:      []  Discussed case with specialists:    []  Independent interpretation of ECG/Imaging etc:             []  Moderate: Rx management, low risk surgery, suboptimal social situation   []  High:  Rx with close monitoring for toxicity, mod-high risk surgery, DNR decision, Comfort initiated, IV pain meds    Assessment / Plan   Assessment:    Generalized weakness/unable to walk  Citrobacter UTI  Morbid obesity with BMI 55 (214 kg)  History of chronic atrial fibrillation  History of diastolic CHF  DM with neuropathy  Chronic venous stasis changes lower extremities  Horizontal diplopia, per neurology likely DM 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   Up in chair to mobilize.  Rehab referrals in progress  Continue to mobilize.  Continue PT/OT.  Gentamicin for UTI based on cultures/sensitivities  Eyepatch for diplopia  Daily PT and OT   for rehab  Continue Eliquis  Recent MRI 10/29/23 shows no evidence of ischemia or mass.  Nonspecific chronic changes  Recent noncontrasted CT head 10/29/23 shows no intracranial abnormalities    Discussed plan with RN.  DVT prophylaxis:  Medical and mechanical DVT prophylaxis orders are present.  CODE STATUS:      Code Status (Patient has no pulse and is not breathing): CPR (Attempt to Resuscitate)  Medical Interventions (Patient has pulse or is breathing): Full Support         Patient independently seen and evaluated, agree with assessment and plan, above documentation reflects plan put forth during bedside rounds.  More than 51% of the time of this patient encounter was performed by me.    Interval history:  No acute events overnight, patient resting comfortably in bed    GEN: No acute distress  HEENT: Moist mucous membranes  LUNGS: Equal chest rise bilaterally  CARDIAC: Regular rate and rhythm  NEURO: Moving all 4 extremities spontaneously  SKIN: No obvious breakdown    Plan:  Agree with assessment plan as above  Continue PT/OT  Continue to titrate insulin  Status post gentamicin for UTI  Continue eyepatch as needed  Continue Eliquis  Rehab when bed available      Electronically signed by Arsalan Serrano MD, 11/07/23, 10:24 AM EST.

## 2023-11-07 NOTE — THERAPY TREATMENT NOTE
Acute Care - Physical Therapy Treatment Note   [place]     Patient Name: Niall Mak  : 1960  MRN: [id number]  Today's Date: 2023      Visit Dx:     ICD-10-CM ICD-9-CM   1. Generalized weakness  R53.1 780.79   2. Impaired mobility and ADLs  Z74.09 V49.89    Z78.9    3. Difficulty walking  R26.2 719.7     Patient Active Problem List   Diagnosis    Sepsis    Atrial fibrillation with rapid ventricular response    6th nerve palsy    Weakness    Complicated UTI (urinary tract infection)     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     Past Surgical History:   Procedure Laterality Date    COLONOSCOPY      TONSILLECTOMY       PT Assessment (last 12 hours)       PT Evaluation and Treatment       Row Name 23 1102          Physical Therapy Time and Intention    Subjective Information complains of;weakness;fatigue  -DK     Document Type therapy note (daily note)  -DK     Mode of Treatment individual therapy;physical therapy  -DK     Patient Effort good  -DK       Row Name 23 1102          Pain    Pretreatment Pain Rating 0/10 - no pain  -DK     Posttreatment Pain Rating 0/10 - no pain  -DK       Row Name 23 1102          Cognition    Affect/Mental Status (Cognition) WNL  -DK     Orientation Status (Cognition) oriented x 4  -DK     Follows Commands (Cognition) WNL  -DK     Cognitive Function WNL  -DK     Personal Safety Interventions nonskid shoes/slippers when out of bed;supervised activity;gait belt  -DK       Row Name 23 1102          Bed Mobility    Bed Mobility supine-sit-supine;scooting/bridging  -DK     Scooting/Bridging [place] (Bed Mobility) standby assist  -DK     Supine-Sit [place] (Bed Mobility) standby assist  -DK     Sit-Supine  [place] (Bed Mobility) standby assist;contact guard;1 person assist  -DK     Supine-Sit-Supine [place] (Bed Mobility) standby assist;contact guard;1 person assist  -DK     Bed Mobility, Safety Issues decreased use of legs for bridging/pushing  -DK     Assistive Device (Bed Mobility) bed rails  -DK       Row Name 11/07/23 1102          Transfers    Transfers sit-stand transfer;stand-sit transfer  -DK       Row Name 11/07/23 1102          Sit-Stand Transfer    Sit-Stand [place] (Transfers) minimum assist (75% patient effort);2 person assist;contact guard  -DK     Assistive Device (Sit-Stand Transfers) walker, front-wheeled  -DK       Row Name 11/07/23 1102          Stand-Sit Transfer    Stand-Sit [place] (Transfers) minimum assist (75% patient effort);2 person assist;contact guard  -DK     Assistive Device (Stand-Sit Transfers) walker, front-wheeled  -DK       Row Name 11/07/23 1102          Gait/Stairs (Locomotion)    Gait/Stairs Locomotion gait/ambulation independence;gait/ambulation assistive device;distance ambulated;gait pattern  -DK     [place] Level (Gait) contact guard;minimum assist (75% patient effort);2 person assist  -DK     Assistive Device (Gait) walker, front-wheeled  -DK     Distance in Feet (Gait) 3  -DK     Pattern (Gait) step-to  -DK     Deviations/Abnormal Patterns (Gait) lacey decreased;festinating/shuffling;gait speed decreased;stride length decreased  -DK     Bilateral Gait Deviations forward flexed posture  -DK     Comment, (Gait/Stairs) Pt was able to stand and ambulate 3' sidestepping to the left with a standard walker and assist x 2.  He was given cues to walk with legs extended to minimize the risk of knees buckling.  Pt was left in the recliner on alert post treatment.  -DK       Row Name 11/07/23 1102          Safety Issues, Functional Mobility    Impairments Affecting Function (Mobility) balance;endurance/activity tolerance;visual/perceptual;postural/trunk  control;strength  -       Row Name 11/07/23 1102          Balance    Balance Assessment sitting static balance;sitting dynamic balance;standing static balance;standing dynamic balance  -     Static Sitting Balance standby assist  -     Dynamic Sitting Balance standby assist  -DK     Position, Sitting Balance unsupported;sitting edge of bed;sitting in chair  -DK     Static Standing Balance contact guard;2-person assist  -DK     Dynamic Standing Balance contact guard;minimal assist;2-person assist  -DK     Position/Device Used, Standing Balance walker, standard  -     Balance Interventions standing;supported;static;dynamic;tandem gait  -       Row Name 11/07/23 1102          Motor Skills    Motor Skills --  therapeutic exercises  -     Coordination WFL  -     Therapeutic Exercise hip;knee;ankle  -       Row Name 11/07/23 1102          Hip (Therapeutic Exercise)    Hip (Therapeutic Exercise) AROM (active range of motion)  -     Hip AROM (Therapeutic Exercise) bilateral;flexion;extension;aBduction;aDduction;sitting;15 repititions  -       Row Name 11/07/23 1102          Knee (Therapeutic Exercise)    Knee (Therapeutic Exercise) AROM (active range of motion)  -     Knee AROM (Therapeutic Exercise) bilateral;flexion;extension;LAQ (long arc quad);sitting;15 repititions  -       Row Name 11/07/23 1102          Ankle (Therapeutic Exercise)    Ankle (Therapeutic Exercise) AROM (active range of motion)  -     Ankle AROM (Therapeutic Exercise) bilateral;dorsiflexion;plantarflexion;sitting;15 repititions  -       Row Name             Wound 11/01/23 1802 Right anterior hip MASD (Moisture associated skin damage)    Wound - Properties Group Placement Date: 11/01/23 -KM Placement Time: 1802  -KM Present on Original Admission: Y  -KM Side: Right  -KM Orientation: anterior  -KM Location: hip  -KM Primary Wound Type: MASD  -KM    Retired Wound - Properties Group Placement Date: 11/01/23  -KM Placement Time:  1802  -KM Present on Original Admission: Y  -KM Side: Right  -KM Orientation: anterior  -KM Location: hip  -KM Primary Wound Type: MASD  -KM    Retired Wound - Properties Group Date first assessed: 11/01/23  -KM Time first assessed: 1802  -KM Present on Original Admission: Y  -KM Side: Right  -KM Location: hip  -KM Primary Wound Type: MASD  -KM      Row Name             Wound 11/01/23 1806    Wound - Properties Group Placement Date: 11/01/23  -KM Placement Time: 1806  -KM    Retired Wound - Properties Group Placement Date: 11/01/23  -KM Placement Time: 1806  -KM    Retired Wound - Properties Group Date first assessed: 11/01/23  -KM Time first assessed: 1806  -KM      Row Name             Wound 07/26/21 0400 medial abdomen Blisters    Wound - Properties Group Placement Date: 07/26/21  -DG Placement Time: 0400  -DG Present on Original Admission: Y  -DG Orientation: medial  -DG Location: abdomen  -DG Primary Wound Type: Blisters  -DG    Retired Wound - Properties Group Placement Date: 07/26/21  -DG Placement Time: 0400  -DG Present on Original Admission: Y  -DG Orientation: medial  -DG Location: abdomen  -DG Primary Wound Type: Blisters  -DG    Retired Wound - Properties Group Date first assessed: 07/26/21  -DG Time first assessed: 0400  -DG Present on Original Admission: Y  -DG Location: abdomen  -DG Primary Wound Type: Blisters  -DG      Row Name 11/07/23 1102          Plan of Care Review    Plan of Care Reviewed With patient  -DK     Progress improving  -DK       Row Name 11/07/23 1102          Positioning and Restraints    Pre-Treatment Position in bed  -DK     Post Treatment Position chair  -DK     In Chair sitting;call light within reach;encouraged to call for assist;exit alarm on;waffle cushion  -DK       Row Name 11/07/23 1102          Therapy Assessment/Plan (PT)    Rehab Potential (PT) good, to achieve stated therapy goals  -DK     Criteria for Skilled Interventions Met (PT) skilled treatment is necessary   -DK     Therapy Frequency (PT) daily  -DK     Problem List (PT) balance;mobility;strength;vision  -DK     Activity Limitations Related to Problem List (PT) unable to ambulate safely;unable to transfer safely  -DK       Row Name 11/07/23 1102          Progress Summary (PT)    Progress Toward Functional Goals (PT) progress toward functional goals is good  -DK               User Key  (r) = Recorded By, (t) = Taken By, (c) = Cosigned By      Initials Name Provider Type    Tiera Montelongo, RN Registered Nurse    Nely Richard RN Registered Nurse    Nilda Aggarwal PTA Physical Therapist Assistant                      PT Recommendation and Plan  Planned Therapy Interventions (PT): balance training, bed mobility training, gait training, home exercise program, strengthening, transfer training  Therapy Frequency (PT): daily  Progress Summary (PT)  Progress Toward Functional Goals (PT): progress toward functional goals is good  Plan of Care Reviewed With: patient  Progress: improving   Outcome Measures       Row Name 11/07/23 1102 11/06/23 1142 11/05/23 1220       How much help from another person do you currently need...    Turning from your back to your side while in flat bed without using bedrails? 4  -DK 4  -DK 4  -DK    Moving from lying on back to sitting on the side of a flat bed without bedrails? 4  -DK 4  -DK 4  -DK    Moving to and from a bed to a chair (including a wheelchair)? 3  -DK 2  -DK 2  -DK    Standing up from a chair using your arms (e.g., wheelchair, bedside chair)? 3  -DK 3  -DK 3  -DK    Climbing 3-5 steps with a railing? 2  -DK 2  -DK 2  -DK    To walk in hospital room? 2  -DK 2  -DK 2  -DK    AM-PAC 6 Clicks Score (PT) 18  -DK 17  -DK 17  -DK    Highest level of mobility 6 --> Walked 10 steps or more  -DK 5 --> Static standing  -DK 5 --> Static standing  -DK       Functional Assessment    Outcome Measure Options AM-PAC 6 Clicks Basic Mobility (PT)  -DK AM-PAC 6 Clicks Basic Mobility (PT)   -DK AM-PAC 6 Clicks Basic Mobility (PT)  -DK              User Key  (r) = Recorded By, (t) = Taken By, (c) = Cosigned By      Initials Name Provider Type    iNlda Aggarwal PTA Physical Therapist Assistant                     Time Calculation:    PT Charges       Row Name 11/07/23 1108             Time Calculation    PT Received On 11/07/23  -DK      PT Goal Re-Cert Due Date 11/11/23  -DK         Timed Charges    42095 - PT Therapeutic Exercise Minutes 14  -DK      88452 - Gait Training Minutes  2  -DK      88695 - PT Therapeutic Activity Minutes 9  -DK         Total Minutes    Timed Charges Total Minutes 25  -DK       Total Minutes 25  -DK                User Key  (r) = Recorded By, (t) = Taken By, (c) = Cosigned By      Initials Name Provider Type    Nilda Aggarwal PTA Physical Therapist Assistant                  Therapy Charges for Today       Code Description Service Date Service Provider Modifiers Qty    52247773547 HC PT THER PROC EA 15 MIN 11/6/2023 Nilda Mattson, ENA GP 1    71371589546 HC PT THER PROC EA 15 MIN 11/7/2023 Nilda Mattson PTA GP 1    50632467020 HC PT THERAPEUTIC ACT EA 15 MIN 11/7/2023 Nilda Mattson, ENA GP 1            PT G-Codes  Outcome Measure Options: AM-PAC 6 Clicks Basic Mobility (PT)  AM-PAC 6 Clicks Score (PT): 18  AM-PAC 6 Clicks Score (OT): 16    Nilda Mattson PTA  11/7/2023

## 2023-11-07 NOTE — THERAPY TREATMENT NOTE
Patient Name: Niall Mak  : 1960    MRN: [id number]                              Today's Date: 2023       Admit Date: 2023    Visit Dx:     ICD-10-CM ICD-9-CM   1. Generalized weakness  R53.1 780.79   2. Impaired mobility and ADLs  Z74.09 V49.89    Z78.9    3. Difficulty walking  R26.2 719.7     Patient Active Problem List   Diagnosis    Sepsis    Atrial fibrillation with rapid ventricular response    6th nerve palsy    Weakness    Complicated UTI (urinary tract infection)     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     Past Surgical History:   Procedure Laterality Date    COLONOSCOPY      TONSILLECTOMY        General Information       Row Name 23 1112          OT Time and Intention    Document Type therapy note (daily note)  -PG     Mode of Treatment individual therapy;occupational therapy  -PG               User Key  (r) = Recorded By, (t) = Taken By, (c) = Cosigned By      Initials Name Provider Type    PG Enrrique Catherine OT Occupational Therapist                     Mobility/ADL's    No documentation.                  Obj/Interventions       Row Name 23 1113          Shoulder (Therapeutic Exercise)    Shoulder (Therapeutic Exercise) strengthening exercise  -PG     Shoulder Strengthening (Therapeutic Exercise) resistance band;green;15 repititions  -PG       Row Name 23 1113          Elbow/Forearm (Therapeutic Exercise)    Elbow/Forearm (Therapeutic Exercise) strengthening exercise  -PG     Elbow/Forearm Strengthening (Therapeutic Exercise) 15 repititions;green;resistance band  -PG       Row Name 23 1113          Motor Skills    Therapeutic Exercise shoulder;elbow/forearm  -PG               User Key  (r) = Recorded By, (t) = Taken By, (c) =  Cosigned By      Initials Name Provider Type    PG Enrrique Catherine OT Occupational Therapist                   Goals/Plan    No documentation.                  Clinical Impression       Row Name 11/07/23 1113          Plan of Care Review    Progress no change  -PG               User Key  (r) = Recorded By, (t) = Taken By, (c) = Cosigned By      Initials Name Provider Type    PG Enrrique Catherine OT Occupational Therapist                   Outcome Measures       Row Name 11/07/23 1113          How much help from another is currently needed...    Putting on and taking off regular lower body clothing? 2  -PG     Bathing (including washing, rinsing, and drying) 2  -PG     Toileting (which includes using toilet bed pan or urinal) 1  -PG     Putting on and taking off regular upper body clothing 3  -PG     Taking care of personal grooming (such as brushing teeth) 3  -PG     Eating meals 4  -PG     AM-PAC 6 Clicks Score (OT) 15  -PG       Row Name 11/07/23 1102          How much help from another person do you currently need...    Turning from your back to your side while in flat bed without using bedrails? 4  -DK     Moving from lying on back to sitting on the side of a flat bed without bedrails? 4  -DK     Moving to and from a bed to a chair (including a wheelchair)? 3  -DK     Standing up from a chair using your arms (e.g., wheelchair, bedside chair)? 3  -DK     Climbing 3-5 steps with a railing? 2  -DK     To walk in hospital room? 2  -DK     AM-PAC 6 Clicks Score (PT) 18  -DK     Highest level of mobility 6 --> Walked 10 steps or more  -DK       Row Name 11/07/23 1113 11/07/23 1102       Functional Assessment    Outcome Measure Options AM-PAC 6 Clicks Daily Activity (OT);Optimal Instrument  -PG AM-PAC 6 Clicks Basic Mobility (PT)  -DK      Row Name 11/07/23 1113          Optimal Instrument    Optimal Instrument Optimal - 3  -PG     Bending/Stooping 4  -PG     Standing 3  -PG     Reaching 1  -PG               User Key  (r)  = Recorded By, (t) = Taken By, (c) = Cosigned By      Initials Name Provider Type    Nilda Aggarwal PTA Physical Therapist Assistant    PG Enrrique Catherine OT Occupational Therapist                    Occupational Therapy Education       Title: PT OT SLP Therapies (Done)       Topic: Occupational Therapy (Done)       Point: ADL training (Done)       Description:   Instruct learner(s) on proper safety adaptation and remediation techniques during self care or transfers.   Instruct in proper use of assistive devices.                  Learning Progress Summary             Patient Acceptance, E,D, DU by PG at 11/2/2023 1005                         Point: Home exercise program (Done)       Description:   Instruct learner(s) on appropriate technique for monitoring, assisting and/or progressing therapeutic exercises/activities.                  Learning Progress Summary             Patient Acceptance, E,D, DU by PG at 11/2/2023 1005                         Point: Precautions (Done)       Description:   Instruct learner(s) on prescribed precautions during self-care and functional transfers.                  Learning Progress Summary             Patient Acceptance, E,D, DU by PG at 11/2/2023 1005                         Point: Body mechanics (Done)       Description:   Instruct learner(s) on proper positioning and spine alignment during self-care, functional mobility activities and/or exercises.                  Learning Progress Summary             Patient Acceptance, E,D, DU by PG at 11/2/2023 1005                                         User Key       Initials Effective Dates Name Provider Type Discipline     06/16/21 -  Enrrique Catherine OT Occupational Therapist OT                  OT Recommendation and Plan  Planned Therapy Interventions (OT): activity tolerance training, BADL retraining, strengthening exercise, transfer/mobility retraining, patient/caregiver education/training, occupation/activity based  interventions  Therapy Frequency (OT): 5 times/wk  Plan of Care Review  Plan of Care Reviewed With: patient  Progress: no change  Outcome Evaluation: Patient presents with limitations affecting strength, activity tolerance, and balance impacting patient's ability to return home safely and independently.  The skills of a therapist will be required to safely and effectively implement the following treatment plan to restore maximal level of function     Time Calculation:   Evaluation Complexity (OT)  Review Occupational Profile/Medical/Therapy History Complexity: brief/low complexity  Assessment, Occupational Performance/Identification of Deficit Complexity: 3-5 performance deficits  Clinical Decision Making Complexity (OT): problem focused assessment/low complexity  Overall Complexity of Evaluation (OT): low complexity     Time Calculation- OT       Row Name 11/07/23 1114 11/07/23 1108          Time Calculation- OT    OT Received On 11/07/23  -PG --     OT Goal Re-Cert Due Date 11/11/23  -PG --        Timed Charges    02219 - OT Therapeutic Exercise Minutes 12  -PG --     11101 - Gait Training Minutes  -- 2  -DK        Total Minutes    Timed Charges Total Minutes 12  -PG 2  -DK      Total Minutes 12  -PG 2  -DK               User Key  (r) = Recorded By, (t) = Taken By, (c) = Cosigned By      Initials Name Provider Type    Nilda Aggarwal, PTA Physical Therapist Assistant    PG Enrrique Catherine OT Occupational Therapist                  Therapy Charges for Today       Code Description Service Date Service Provider Modifiers Qty    28621605303 HC OT THER PROC EA 15 MIN 11/6/2023 Enrrique Catherine OT GO 1    75048764216 HC OT THER PROC EA 15 MIN 11/7/2023 Enrrique Catherine OT GO 1                 Enrrique Catherine OT  11/7/2023

## 2023-11-08 VITALS
SYSTOLIC BLOOD PRESSURE: 139 MMHG | WEIGHT: 315 LBS | HEART RATE: 85 BPM | DIASTOLIC BLOOD PRESSURE: 79 MMHG | HEIGHT: 77 IN | RESPIRATION RATE: 20 BRPM | BODY MASS INDEX: 37.19 KG/M2 | OXYGEN SATURATION: 93 % | TEMPERATURE: 98.9 F

## 2023-11-08 LAB
GLUCOSE BLDC GLUCOMTR-MCNC: 241 MG/DL (ref 70–99)
GLUCOSE BLDC GLUCOMTR-MCNC: 258 MG/DL (ref 70–99)

## 2023-11-08 PROCEDURE — 63710000001 INSULIN LISPRO (HUMAN) PER 5 UNITS: Performed by: PHYSICIAN ASSISTANT

## 2023-11-08 PROCEDURE — 82948 REAGENT STRIP/BLOOD GLUCOSE: CPT

## 2023-11-08 PROCEDURE — 99239 HOSP IP/OBS DSCHRG MGMT >30: CPT | Performed by: INTERNAL MEDICINE

## 2023-11-08 PROCEDURE — 63710000001 INSULIN DETEMIR PER 5 UNITS: Performed by: PHYSICIAN ASSISTANT

## 2023-11-08 RX ADMIN — INSULIN LISPRO 6 UNITS: 100 INJECTION, SOLUTION INTRAVENOUS; SUBCUTANEOUS at 08:21

## 2023-11-08 RX ADMIN — METOPROLOL SUCCINATE 100 MG: 50 TABLET, EXTENDED RELEASE ORAL at 08:24

## 2023-11-08 RX ADMIN — Medication 10 ML: at 08:21

## 2023-11-08 RX ADMIN — INSULIN LISPRO 6 UNITS: 100 INJECTION, SOLUTION INTRAVENOUS; SUBCUTANEOUS at 12:33

## 2023-11-08 RX ADMIN — PYRIDOSTIGMINE BROMIDE 30 MG: 60 TABLET ORAL at 08:22

## 2023-11-08 RX ADMIN — FLUTICASONE PROPIONATE 1 SPRAY: 50 SPRAY, METERED NASAL at 08:29

## 2023-11-08 RX ADMIN — INSULIN LISPRO 6 UNITS: 100 INJECTION, SOLUTION INTRAVENOUS; SUBCUTANEOUS at 12:32

## 2023-11-08 RX ADMIN — GABAPENTIN 600 MG: 300 CAPSULE ORAL at 08:24

## 2023-11-08 RX ADMIN — DILTIAZEM HYDROCHLORIDE 240 MG: 240 CAPSULE, EXTENDED RELEASE ORAL at 08:23

## 2023-11-08 RX ADMIN — GABAPENTIN 300 MG: 300 CAPSULE ORAL at 08:23

## 2023-11-08 RX ADMIN — DIGOXIN 250 MCG: 250 TABLET ORAL at 08:22

## 2023-11-08 RX ADMIN — ALLOPURINOL 300 MG: 300 TABLET ORAL at 08:21

## 2023-11-08 RX ADMIN — INSULIN DETEMIR 15 UNITS: 100 INJECTION, SOLUTION SUBCUTANEOUS at 08:20

## 2023-11-08 RX ADMIN — TRIAMCINOLONE ACETONIDE 1 APPLICATION: 1 OINTMENT TOPICAL at 10:43

## 2023-11-08 RX ADMIN — INSULIN LISPRO 4 UNITS: 100 INJECTION, SOLUTION INTRAVENOUS; SUBCUTANEOUS at 08:21

## 2023-11-08 RX ADMIN — APIXABAN 5 MG: 5 TABLET, FILM COATED ORAL at 08:24

## 2023-11-08 NOTE — PLAN OF CARE
Goal Outcome Evaluation:              Outcome Evaluation: VSS, no significant changes. Pt has slept well this shift.

## 2023-11-08 NOTE — PLAN OF CARE
Goal Outcome Evaluation:   Pt transferred to [facility] by EMS. Family aware.

## 2023-11-08 NOTE — DISCHARGE SUMMARY
[facility]         HOSPITALIST  DISCHARGE SUMMARY    Patient Name: Niall Mak  : 1960  MRN: [id number]    Date of Admission: 2023  Date of Discharge:  2023  Primary Care Physician: Shai Azar PA    Consults       Date and Time Order Name Status Description    2023  2:49 PM Inpatient Hospitalist Consult      10/29/2023  1:24 PM Inpatient Neurology Consult General Completed             Active and Resolved Hospital Problems:  Generalized weakness/unable to walk  Citrobacter UTI  Morbid obesity with BMI 55 (214 kg)  History of chronic atrial fibrillation  History of diastolic CHF  DM with neuropathy  Chronic venous stasis changes lower extremities  Horizontal diplopia, per neurology 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 male with severe morbid obesity presents with unable to ambulate around the house/general weakness.  He was recently discharged home from the hospital about 3 days ago.  At that time rehab was recommended but he refused.  Since going home he has not gotten up out of bed.  He was brought back to the ED due to inability to ambulate or get out of bed. In the ED he has no acute findings but is unable to ambulate.  Patient was evaluated by PT/OT, recommended subacute rehab, patient was excepted and is discharged to rehab today in stable condition.    Day of Discharge     Vital Signs:  Temp:  [97.5 °F (36.4 °C)-98.6 °F (37 °C)] 98.6 °F (37 °C)  Heart Rate:  [81-89] 84  Resp:  [20] 20  BP: (133-157)/(63-83) 157/79    Physical Exam:   GEN: No acute distress  HEENT: Moist mucous membranes  LUNGS: Equal chest rise bilaterally  CARDIAC: Regular rate and rhythm  NEURO: Moving all 4 extremities spontaneously  SKIN: No obvious breakdown    Discharge Details        Discharge Medications        Continue These Medications        Instructions Start Date   albuterol  sulfate  (90 Base) MCG/ACT inhaler  Commonly known as: PROVENTIL HFA;VENTOLIN HFA;PROAIR HFA   2 puffs, Inhalation, Every 6 Hours PRN      allopurinol 300 MG tablet  Commonly known as: ZYLOPRIM   300 mg, Oral, Daily      Cinnamon 500 MG capsule   500 mg, Oral, 2 times daily      digoxin 250 MCG tablet  Commonly known as: LANOXIN   250 mcg, Oral, Daily      dilTIAZem  MG 24 hr capsule  Commonly known as: CARDIZEM CD   240 mg, Oral, Daily      Eliquis 5 MG tablet tablet  Generic drug: apixaban   1 tablet, Oral, 2 Times Daily      fluticasone 50 MCG/ACT nasal spray  Commonly known as: FLONASE   1 spray, Nasal, Daily      furosemide 40 MG tablet  Commonly known as: LASIX   1 tablet, Oral, Daily      gabapentin 600 MG tablet  Commonly known as: NEURONTIN   600 mg, Oral, 3 Times Daily      gabapentin 300 MG capsule  Commonly known as: NEURONTIN   Take 1 capsule by mouth 3 (Three) Times a Day.      glimepiride 4 MG tablet  Commonly known as: AMARYL   4 mg, Oral, 2 Times Daily      HYDROcodone-acetaminophen 5-325 MG per tablet  Commonly known as: NORCO   1 tablet, Oral, Every 12 Hours PRN      metFORMIN 500 MG tablet  Commonly known as: GLUCOPHAGE   1,000 mg, Oral, 2 Times Daily With Meals, Take 2 tablets twice a day      metoprolol succinate  MG 24 hr tablet  Commonly known as: TOPROL-XL   Take 1 tablet by mouth Daily.      potassium chloride 10 MEQ CR tablet   10 mEq, Oral, Daily      pyridostigmine 60 MG tablet  Commonly known as: MESTINON   30 mg, Oral, 3 Times Daily      Turmeric 400 MG capsule   400 mg, Oral, Daily      ZyrTEC Allergy 10 MG capsule  Generic drug: Cetirizine HCl   10 mg, Oral, Daily               Allergies   Allergen Reactions   • Cucumber Extract Unknown - High Severity and Other (See Comments)   • Influenza Virus Vaccine Unknown - High Severity   • Sitagliptin Unknown - High Severity   • Tabiona Unknown - High Severity   • Tomato Unknown - High Severity and Other (See Comments)    • Nicotine Rash       Discharge Disposition:  Rehab Facility or Unit (DC - External)    Diet:  Hospital:  Diet Order   Procedures   • Diet: Regular/House Diet; Texture: Regular Texture (IDDSI 7); Fluid Consistency: Thin (IDDSI 0)       Discharge Activity:       CODE STATUS:  Code Status and Medical Interventions:   Ordered at: 11/01/23 1550     Code Status (Patient has no pulse and is not breathing):    CPR (Attempt to Resuscitate)     Medical Interventions (Patient has pulse or is breathing):    Full Support       Future Appointments   Date Time Provider Department Center   1/2/2024 11:15 AM Hector Valerio MD [facility]       Additional Instructions for the Follow-ups that You Need to Schedule       Discharge Follow-up with PCP   As directed       Currently Documented PCP:    Shai Azar PA    PCP Phone Number:    [phone]     Follow Up Details: 3 to 7 days                Pertinent  and/or Most Recent Results     IMAGING:  XR Chest 1 View    Result Date: 11/1/2023  PROCEDURE: XR CHEST 1 VW  COMPARISON: [facility], , XR CHEST 1 VW, 7/07/2023, 12:58.  INDICATIONS: generalized weakness. sob  FINDINGS:  Study is limited by patient positioning, rotation.  Study also limited by body habitus Heart size is enlarged, accentuated by technique.  There is mild pulmonary vascular congestion.  Evaluation of the left lung bases limited.  Mild atelectasis is question.  No focal consolidation otherwise noted.  Osseous structures unremarkable        1. Cardiomegaly and mild pulmonary vascular congestion.  No definite overt pulmonary edema noted. 2. Suspected atelectasis left lung base.  Lungs are otherwise grossly clear given limitations of the exam       BRIANNE HANNA MD       Electronically Signed and Approved By: BRIANNE HANNA MD on 11/01/2023 at 15:35             MRI Brain Without Contrast    Result Date: 10/29/2023  PROCEDURE: MRI BRAIN WO CONTRAST  COMPARISON: None   INDICATIONS: dizziness, unsteady gait, visual disturbance      TECHNIQUE: A variety of imaging planes and parameters were utilized for visualization of suspected pathology.  Images were performed without contrast.  FINDINGS:  No focal abnormal diffusion restriction is observed to indicate acute focal ischemia. Mild nonspecific periventricular, deep white matter, and subcortical white matter signal changes are seen bilaterally likely related to chronic small vessel ischemic changes or age-related changes. Mild associated diffuse volume loss is observed. These findings are age-appropriate. There is no evidence for abnormal focal enhancement or abnormal enhancing mass. No abnormal cerebral edema is seen. No mass effect or midline shift is observed. The midline structures are unremarkable. The basilar cisterns remain patent. No significant extra-axial fluid collections are identified. The expected signal voids of the intracranial vessels are noted.  No significant abnormal signal is observed corresponding to the paranasal sinuses or mastoid air cells.        1. No evidence for acute focal ischemia. 2. No evidence for abnormal focal enhancement or abnormal enhancing mass. 3. Nonspecific white matter signal changes seen bilaterally likely related to chronic small vessel ischemic changes or age-related changes. Associated diffuse volume loss is observed.        MOE MURPHY MD       Electronically Signed and Approved By: MOE MURPHY MD on 10/29/2023 at 16:12             CT Head Without Contrast    Result Date: 10/29/2023  PROCEDURE: CT HEAD WO CONTRAST  COMPARISON:  [facility], CT, HEAD W/O CONTRAST, 11/29/2015, 4:38. INDICATIONS: Blurred vision, headache  PROTOCOL:   Standard imaging protocol performed    RADIATION:   DLP: 927 mGy*cm   MA and/or KV was adjusted to minimize radiation dose.     TECHNIQUE: After obtaining the patient's consent, CT images were obtained without non-ionic intravenous contrast  material.  FINDINGS:  There is no evidence for acute intracranial hemorrhage. No definitive acute focal ischemia is observed. There is no evidence for abnormal cerebral edema. No mass effect or midline shift is seen. The ventricular system is nondilated. The basilar cisterns are patent. The skull is intact without displaced fracture. The paranasal sinuses and mastoid air cells are clear.        1. No evidence for acute intracranial abnormality.     MOE MURPHY MD       Electronically Signed and Approved By: MOE MURPHY MD on 10/29/2023 at 12:13               LAB RESULTS:      Lab 11/07/23  0512 11/06/23  0441 11/05/23  0502 11/03/23 0453 11/02/23 0518 11/01/23  2332   WBC 9.65 8.86 9.24 9.23 8.36  --    HEMOGLOBIN 15.0 15.4 14.9 14.5 13.3  --    HEMATOCRIT 46.7 47.6 46.3 46.3 41.9  --    PLATELETS 341 337 347 331 299  --    NEUTROS ABS 7.19* 6.49 6.85 6.65 6.04  --    IMMATURE GRANS (ABS) 0.04 0.04 0.05 0.06* 0.05  --    LYMPHS ABS 1.32 1.33 1.28 1.39 1.30  --    MONOS ABS 0.74 0.67 0.71 0.72 0.68  --    EOS ABS 0.30 0.30 0.30 0.35 0.25  --    MCV 91.0 92.1 90.3 93.2 91.3  --    LACTATE  --   --   --   --   --  1.3         Lab 11/07/23  0512 11/06/23 0441 11/05/23  0502 11/03/23 0453 11/02/23  0518   SODIUM 131* 137 135* 137 136   POTASSIUM 4.1 4.3 4.0 4.2 3.6   CHLORIDE 95* 98 97* 98 101   CO2 28.0 30.3* 29.4* 30.8* 27.3   ANION GAP 8.0 8.7 8.6 8.2 7.7   BUN 12 11 10 12 11   CREATININE 0.82 0.74* 0.73* 0.92 0.75*   EGFR 98.7 101.8 102.2 93.5 101.4   GLUCOSE 254* 263* 270* 251* 216*   CALCIUM 9.7 9.6 9.6 9.6 9.3   MAGNESIUM 1.8 1.9 1.8 1.9  --    PHOSPHORUS 3.5 3.2 3.1 3.8  --          Lab 11/07/23  0512 11/06/23  0441 11/03/23  0453 11/01/23  1410   TOTAL PROTEIN 7.5 7.5 7.5 7.8   ALBUMIN 3.6 3.6 3.8 3.7   GLOBULIN  --   --   --  4.1   ALT (SGPT) 18 16 16 14   AST (SGOT) 15 13 16 18   BILIRUBIN 0.5 0.4 0.3 0.5   BILIRUBIN DIRECT <0.2 <0.2 <0.2  --    ALK PHOS 67 65 71 57   LIPASE  --   --   --  26                      Brief Urine Lab Results  (Last result in the past 365 days)        Color   Clarity   Blood   Leuk Est   Nitrite   Protein   CREAT   Urine HCG        11/01/23 1548 Dark Yellow   Cloudy   Negative   Small (1+)   Positive   30 mg/dL (1+)                 Microbiology Results (last 10 days)       Procedure Component Value - Date/Time    Blood Culture - Blood, Arm, Left [[id number]]  (Normal) Collected: 11/01/23 2332    Lab Status: Final result Specimen: Blood from Arm, Left Updated: 11/06/23 2245     Blood Culture No growth at 5 days    Blood Culture - Blood, Hand, Left [[id number]]  (Normal) Collected: 11/01/23 2332    Lab Status: Final result Specimen: Blood from Hand, Left Updated: 11/06/23 2245     Blood Culture No growth at 5 days    Clostridioides difficile Toxin - Stool, Per Rectum [[id number]] Collected: 11/01/23 1638    Lab Status: Final result Specimen: Stool from Per Rectum Updated: 11/01/23 1744    Narrative:      The following orders were created for panel order Clostridioides difficile Toxin - Stool, Per Rectum.  Procedure                               Abnormality         Status                     ---------                               -----------         ------                     Clostridioides difficile...[[id number]]                      Final result                 Please view results for these tests on the individual orders.    Enteric Bacterial Panel - Stool, Per Rectum [[id number]]  (Normal) Collected: 11/01/23 1638    Lab Status: Final result Specimen: Stool from Per Rectum Updated: 11/02/23 1127     Salmonella Not Detected     Campylobacter Not Detected     Shigella/Enteroinvasive E. coli (EIEC) Not Detected     Shiga-like toxin-producing E. coli (STEC) stx1/stx2 Not Detected    Clostridioides difficile Toxin, PCR - Stool, Per Rectum [[id number]] Collected: 11/01/23 1638    Lab Status: Final result Specimen: Stool from Per Rectum Updated: 11/01/23 1744     Toxigenic C. difficile by PCR  Negative     027 Toxin Presumptive Negative    Narrative:      The result indicates the absence of toxigenic C. difficile from stool specimen.     Urine Culture - Urine, Urine, Clean Catch [[id number]]  (Abnormal)  (Susceptibility) Collected: 11/01/23 1548    Lab Status: Final result Specimen: Urine, Clean Catch Updated: 11/03/23 0133     Urine Culture >100,000 CFU/mL Citrobacter freundii    Narrative:      Colonization of the urinary tract without infection is common. Treatment is discouraged unless the patient is symptomatic, pregnant, or undergoing an invasive urologic procedure.      Susceptibility        Citrobacter freundii      WILY      Cefazolin Resistant      Cefepime Susceptible      Ceftazidime Resistant      Ceftriaxone Resistant      Gentamicin Susceptible      Levofloxacin Susceptible      Nitrofurantoin Intermediate      Piperacillin + Tazobactam Resistant      Trimethoprim + Sulfamethoxazole Susceptible                           Respiratory Panel PCR w/COVID-19(SARS-CoV-2) JAN/BEATRIZ/MICHAEL/PAD/COR/MAD/YARON In-House, NP Swab in UTM/VTM, 3-4 HR TAT - Swab, Nasopharynx [[id number]]  (Normal) Collected: 10/30/23 0153    Lab Status: Final result Specimen: Swab from Nasopharynx Updated: 10/30/23 0246     ADENOVIRUS, PCR Not Detected     Coronavirus 229E Not Detected     Coronavirus HKU1 Not Detected     Coronavirus NL63 Not Detected     Coronavirus OC43 Not Detected     COVID19 Not Detected     Human Metapneumovirus Not Detected     Human Rhinovirus/Enterovirus Not Detected     Influenza A PCR Not Detected     Influenza B PCR Not Detected     Parainfluenza Virus 1 Not Detected     Parainfluenza Virus 2 Not Detected     Parainfluenza Virus 3 Not Detected     Parainfluenza Virus 4 Not Detected     RSV, PCR Not Detected     Bordetella pertussis pcr Not Detected     Bordetella parapertussis PCR Not Detected     Chlamydophila pneumoniae PCR Not Detected     Mycoplasma pneumo by PCR Not Detected    Narrative:      In  the setting of a positive respiratory panel with a viral infection PLUS a negative procalcitonin without other underlying concern for bacterial infection, consider observing off antibiotics or discontinuation of antibiotics and continue supportive care. If the respiratory panel is positive for atypical bacterial infection (Bordetella pertussis, Chlamydophila pneumoniae, or Mycoplasma pneumoniae), consider antibiotic de-escalation to target atypical bacterial infection.              Results for orders placed during the hospital encounter of 07/25/21    Adult Transthoracic Echo Complete W/ Cont if Necessary Per Protocol    Interpretation Summary  · Left ventricular wall thickness is consistent with mild posterior asymmetric hypertrophy.  · The right atrial cavity is mildly dilated.  · Left ventricular ejection fraction appears to be 56 - 60%.      Time spent on Discharge including face to face service: Greater than 30 minutes      Electronically signed by Arsalan Serrano MD, 11/08/23, 11:41 AM EST.

## 2023-11-09 NOTE — PAYOR COMM NOTE
SUBJECT:     HOSPITAL  TO  SKILLED REHAB   -  AMBULANCE TRANSPORTATION AUTHORIZATION REQUEST      PATIENT'S NAME:     [facility] MRN:     [id number]     DOS:     11/8/2023    INSURANCE MEMBER ID:     [id number]        REQUESTING PROVIDER  Provider/NPI       Address            [address] [place] [address]  Phone               [phone]  Fax                    [phone]        SERVICE / RENDERING PROVIDER  [facility]  NPI  [id number]   Tax ID  [id number]  Taxonomy        [id number] [facility]  Address [address] [place] [address]  Phone  [phone]  Fax  [phone]         CASE MANAGEMENT CONTACT INFORMATION  Name  Primary contact:          Yadira Fonseca                           Secondary contact:     Lila Howard RN             Phone  [phone]  Fax  [phone]        SERVICING INFORMATION  Type of Service:      Medically Related Transportation    Place of Service:     [facility]    Diagnosis #1   R53.1   Weakness         Diagnosis #2   Z78.9 - Other specified health status            Diagnosis #3                Diagnosis #4                Diagnosis #5                  Dates of Service Service Codes Requested Service #  Units   Start Stop      11/8/23 11/8/23  Basic Life Support, Non-emergent 1      Advanced Life Support, Non-emergent, Level 1       Advanced Life Support, Level 2       Specialty Care Transport    11/8/23 11/8/23  [address]      Ambulance oxygen, O2 supplies, life sustaining situation        Leaving From:     [facility], [address] [place] [address]    Destination:   [facility] [address] [facility] [address]            ++++++++++++++++++++++++++++++++++++++++++++++++++++++++++++++++++++++++++++++++++          Niall Mak (63 y.o. Male)       Date of Birth   1960    Social  "Security Number       Address   [address] [place] [address]    Home Phone   [phone]    MRN   [id number]       Holiness   None    Marital Status                               Admission Date   23    Admission Type   Emergency    Admitting Provider   Manuelito Garcia DO    Attending Provider       Department, Room/Bed   [address], [address]013/       Discharge Date   2023    Discharge Disposition   Rehab Facility or Unit (DC - External)    Discharge Destination                                 Attending Provider: (none)   Allergies: Cucumber Extract, Influenza Virus Vaccine, Sitagliptin, Strawberry, Tomato, Nicotine    Isolation: None   Infection: None   Code Status: Prior    Ht: 195.6 cm (77\")   Wt: 214 kg (471 lb 5.5 oz)    Admission Cmt: None   Principal Problem: Weakness [R53.1]                   Active Insurance as of 2023       Primary Coverage       Payor Plan Insurance Group Employer/Plan Group    ANTHEM MEDICARE REPLACEMENT ANTHEM MEDICARE ADVANTAGE [id number]       Payor Plan Address Payor Plan Phone Number Payor Plan Fax Number Effective Dates    [facility] [id number] [phone]  2023 - None Entered    [facility] [id number]         Subscriber Name Subscriber Birth Date Member ID       NIALL AMK 1960 [id number]                     Emergency Contacts        (Rel.) Home Phone Work Phone Mobile Phone    Erika Mak (Spouse) [phone] -- --                   Discharge Summary        Arsalan Serrano MD at [address] [place] [address]         HOSPITALIST  DISCHARGE SUMMARY    Patient Name: Niall Mak  : 1960  MRN: [id number]    Date of Admission: 2023  Date of Discharge:  2023  Primary Care Physician: Shai Azar PA    Consults       Date and Time Order Name Status Description    2023  2:49 PM Inpatient Hospitalist Consult      10/29/2023  1:24 PM Inpatient " Neurology Consult General Completed             Active and Resolved Hospital Problems:  Generalized weakness/unable to walk  Citrobacter UTI  Morbid obesity with BMI 55 (214 kg)  History of chronic atrial fibrillation  History of diastolic CHF  DM with neuropathy  Chronic venous stasis changes lower extremities  Horizontal diplopia, per neurology 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 male with severe morbid obesity presents with unable to ambulate around the house/general weakness.  He was recently discharged home from the hospital about 3 days ago.  At that time rehab was recommended but he refused.  Since going home he has not gotten up out of bed.  He was brought back to the ED due to inability to ambulate or get out of bed. In the ED he has no acute findings but is unable to ambulate.  Patient was evaluated by PT/OT, recommended subacute rehab, patient was excepted and is discharged to rehab today in stable condition.    Day of Discharge     Vital Signs:  Temp:  [97.5 °F (36.4 °C)-98.6 °F (37 °C)] 98.6 °F (37 °C)  Heart Rate:  [81-89] 84  Resp:  [20] 20  BP: (133-157)/(63-83) 157/79    Physical Exam:   GEN: No acute distress  HEENT: Moist mucous membranes  LUNGS: Equal chest rise bilaterally  CARDIAC: Regular rate and rhythm  NEURO: Moving all 4 extremities spontaneously  SKIN: No obvious breakdown    Discharge Details        Discharge Medications        Continue These Medications        Instructions Start Date   albuterol sulfate  (90 Base) MCG/ACT inhaler  Commonly known as: PROVENTIL HFA;VENTOLIN HFA;PROAIR HFA   2 puffs, Inhalation, Every 6 Hours PRN      allopurinol 300 MG tablet  Commonly known as: ZYLOPRIM   300 mg, Oral, Daily      Cinnamon 500 MG capsule   500 mg, Oral, 2 times daily      digoxin 250 MCG tablet  Commonly known as: LANOXIN   250 mcg, Oral, Daily      dilTIAZem  MG 24 hr  capsule  Commonly known as: CARDIZEM CD   240 mg, Oral, Daily      Eliquis 5 MG tablet tablet  Generic drug: apixaban   1 tablet, Oral, 2 Times Daily      fluticasone 50 MCG/ACT nasal spray  Commonly known as: FLONASE   1 spray, Nasal, Daily      furosemide 40 MG tablet  Commonly known as: LASIX   1 tablet, Oral, Daily      gabapentin 600 MG tablet  Commonly known as: NEURONTIN   600 mg, Oral, 3 Times Daily      gabapentin 300 MG capsule  Commonly known as: NEURONTIN   Take 1 capsule by mouth 3 (Three) Times a Day.      glimepiride 4 MG tablet  Commonly known as: AMARYL   4 mg, Oral, 2 Times Daily      HYDROcodone-acetaminophen 5-325 MG per tablet  Commonly known as: NORCO   1 tablet, Oral, Every 12 Hours PRN      metFORMIN 500 MG tablet  Commonly known as: GLUCOPHAGE   1,000 mg, Oral, 2 Times Daily With Meals, Take 2 tablets twice a day      metoprolol succinate  MG 24 hr tablet  Commonly known as: TOPROL-XL   Take 1 tablet by mouth Daily.      potassium chloride 10 MEQ CR tablet   10 mEq, Oral, Daily      pyridostigmine 60 MG tablet  Commonly known as: MESTINON   30 mg, Oral, 3 Times Daily      Turmeric 400 MG capsule   400 mg, Oral, Daily      ZyrTEC Allergy 10 MG capsule  Generic drug: Cetirizine HCl   10 mg, Oral, Daily               Allergies   Allergen Reactions    Cucumber Extract Unknown - High Severity and Other (See Comments)    Influenza Virus Vaccine Unknown - High Severity    Sitagliptin Unknown - High Severity    Laton Unknown - High Severity    Tomato Unknown - High Severity and Other (See Comments)    Nicotine Rash       Discharge Disposition:  Rehab Facility or Unit (DC - External)    Diet:  Hospital:  Diet Order   Procedures    Diet: Regular/House Diet; Texture: Regular Texture (IDDSI 7); Fluid Consistency: Thin (IDDSI 0)       Discharge Activity:       CODE STATUS:  Code Status and Medical Interventions:   Ordered at: 11/01/23 5316     Code Status (Patient has no pulse and is not  breathing):    CPR (Attempt to Resuscitate)     Medical Interventions (Patient has pulse or is breathing):    Full Support       Future Appointments   Date Time Provider Department Center   1/2/2024 11:15 AM Hector Valerio MD [facility]       Additional Instructions for the Follow-ups that You Need to Schedule       Discharge Follow-up with PCP   As directed       Currently Documented PCP:    Shai Azar PA    PCP Phone Number:    [phone]     Follow Up Details: 3 to 7 days                Pertinent  and/or Most Recent Results     IMAGING:  XR Chest 1 View    Result Date: 11/1/2023  PROCEDURE: XR CHEST 1 VW  COMPARISON: [facility], CR, XR CHEST 1 VW, 7/07/2023, 12:58.  INDICATIONS: generalized weakness. sob  FINDINGS:  Study is limited by patient positioning, rotation.  Study also limited by body habitus Heart size is enlarged, accentuated by technique.  There is mild pulmonary vascular congestion.  Evaluation of the left lung bases limited.  Mild atelectasis is question.  No focal consolidation otherwise noted.  Osseous structures unremarkable        1. Cardiomegaly and mild pulmonary vascular congestion.  No definite overt pulmonary edema noted. 2. Suspected atelectasis left lung base.  Lungs are otherwise grossly clear given limitations of the exam       BRIANNE HANNA MD       Electronically Signed and Approved By: BRIANNE HANNA MD on 11/01/2023 at 15:35             MRI Brain Without Contrast    Result Date: 10/29/2023  PROCEDURE: MRI BRAIN WO CONTRAST  COMPARISON: None  INDICATIONS: dizziness, unsteady gait, visual disturbance      TECHNIQUE: A variety of imaging planes and parameters were utilized for visualization of suspected pathology.  Images were performed without contrast.  FINDINGS:  No focal abnormal diffusion restriction is observed to indicate acute focal ischemia. Mild nonspecific periventricular, deep white matter, and subcortical white matter  signal changes are seen bilaterally likely related to chronic small vessel ischemic changes or age-related changes. Mild associated diffuse volume loss is observed. These findings are age-appropriate. There is no evidence for abnormal focal enhancement or abnormal enhancing mass. No abnormal cerebral edema is seen. No mass effect or midline shift is observed. The midline structures are unremarkable. The basilar cisterns remain patent. No significant extra-axial fluid collections are identified. The expected signal voids of the intracranial vessels are noted.  No significant abnormal signal is observed corresponding to the paranasal sinuses or mastoid air cells.        1. No evidence for acute focal ischemia. 2. No evidence for abnormal focal enhancement or abnormal enhancing mass. 3. Nonspecific white matter signal changes seen bilaterally likely related to chronic small vessel ischemic changes or age-related changes. Associated diffuse volume loss is observed.        MOE MURPHY MD       Electronically Signed and Approved By: MOE MURPHY MD on 10/29/2023 at 16:12             CT Head Without Contrast    Result Date: 10/29/2023  PROCEDURE: CT HEAD WO CONTRAST  COMPARISON:  [facility], CT, HEAD W/O CONTRAST, 11/29/2015, 4:38. INDICATIONS: Blurred vision, headache  PROTOCOL:   Standard imaging protocol performed    RADIATION:   DLP: 927 mGy*cm   MA and/or KV was adjusted to minimize radiation dose.     TECHNIQUE: After obtaining the patient's consent, CT images were obtained without non-ionic intravenous contrast material.  FINDINGS:  There is no evidence for acute intracranial hemorrhage. No definitive acute focal ischemia is observed. There is no evidence for abnormal cerebral edema. No mass effect or midline shift is seen. The ventricular system is nondilated. The basilar cisterns are patent. The skull is intact without displaced fracture. The paranasal sinuses and mastoid air cells are clear.         1. No evidence for acute intracranial abnormality.     MOE MURPHY MD       Electronically Signed and Approved By: MOE MURPHY MD on 10/29/2023 at 12:13               LAB RESULTS:      Lab 11/07/23  0512 11/06/23 0441 11/05/23  0502 11/03/23  0453 11/02/23  0518 11/01/23  2332   WBC 9.65 8.86 9.24 9.23 8.36  --    HEMOGLOBIN 15.0 15.4 14.9 14.5 13.3  --    HEMATOCRIT 46.7 47.6 46.3 46.3 41.9  --    PLATELETS 341 337 347 331 299  --    NEUTROS ABS 7.19* 6.49 6.85 6.65 6.04  --    IMMATURE GRANS (ABS) 0.04 0.04 0.05 0.06* 0.05  --    LYMPHS ABS 1.32 1.33 1.28 1.39 1.30  --    MONOS ABS 0.74 0.67 0.71 0.72 0.68  --    EOS ABS 0.30 0.30 0.30 0.35 0.25  --    MCV 91.0 92.1 90.3 93.2 91.3  --    LACTATE  --   --   --   --   --  1.3         Lab 11/07/23  0512 11/06/23 0441 11/05/23  0502 11/03/23 0453 11/02/23  0518   SODIUM 131* 137 135* 137 136   POTASSIUM 4.1 4.3 4.0 4.2 3.6   CHLORIDE 95* 98 97* 98 101   CO2 28.0 30.3* 29.4* 30.8* 27.3   ANION GAP 8.0 8.7 8.6 8.2 7.7   BUN 12 11 10 12 11   CREATININE 0.82 0.74* 0.73* 0.92 0.75*   EGFR 98.7 101.8 102.2 93.5 101.4   GLUCOSE 254* 263* 270* 251* 216*   CALCIUM 9.7 9.6 9.6 9.6 9.3   MAGNESIUM 1.8 1.9 1.8 1.9  --    PHOSPHORUS 3.5 3.2 3.1 3.8  --          Lab 11/07/23  0512 11/06/23 0441 11/03/23 0453 11/01/23  1410   TOTAL PROTEIN 7.5 7.5 7.5 7.8   ALBUMIN 3.6 3.6 3.8 3.7   GLOBULIN  --   --   --  4.1   ALT (SGPT) 18 16 16 14   AST (SGOT) 15 13 16 18   BILIRUBIN 0.5 0.4 0.3 0.5   BILIRUBIN DIRECT <0.2 <0.2 <0.2  --    ALK PHOS 67 65 71 57   LIPASE  --   --   --  26                     Brief Urine Lab Results  (Last result in the past 365 days)        Color   Clarity   Blood   Leuk Est   Nitrite   Protein   CREAT   Urine HCG        11/01/23 1548 Dark Yellow   Cloudy   Negative   Small (1+)   Positive   30 mg/dL (1+)                 Microbiology Results (last 10 days)       Procedure Component Value - Date/Time    Blood Culture - Blood, Arm, Left [[id number]]   (Normal) Collected: 11/01/23 2332    Lab Status: Final result Specimen: Blood from Arm, Left Updated: 11/06/23 2245     Blood Culture No growth at 5 days    Blood Culture - Blood, Hand, Left [[id number]]  (Normal) Collected: 11/01/23 2332    Lab Status: Final result Specimen: Blood from Hand, Left Updated: 11/06/23 2245     Blood Culture No growth at 5 days    Clostridioides difficile Toxin - Stool, Per Rectum [[id number]] Collected: 11/01/23 1638    Lab Status: Final result Specimen: Stool from Per Rectum Updated: 11/01/23 1744    Narrative:      The following orders were created for panel order Clostridioides difficile Toxin - Stool, Per Rectum.  Procedure                               Abnormality         Status                     ---------                               -----------         ------                     Clostridioides difficile...[[id number]]                      Final result                 Please view results for these tests on the individual orders.    Enteric Bacterial Panel - Stool, Per Rectum [[id number]]  (Normal) Collected: 11/01/23 1638    Lab Status: Final result Specimen: Stool from Per Rectum Updated: 11/02/23 1127     Salmonella Not Detected     Campylobacter Not Detected     Shigella/Enteroinvasive E. coli (EIEC) Not Detected     Shiga-like toxin-producing E. coli (STEC) stx1/stx2 Not Detected    Clostridioides difficile Toxin, PCR - Stool, Per Rectum [[id number]] Collected: 11/01/23 1638    Lab Status: Final result Specimen: Stool from Per Rectum Updated: 11/01/23 1744     Toxigenic C. difficile by PCR Negative     027 Toxin Presumptive Negative    Narrative:      The result indicates the absence of toxigenic C. difficile from stool specimen.     Urine Culture - Urine, Urine, Clean Catch [[id number]]  (Abnormal)  (Susceptibility) Collected: 11/01/23 1548    Lab Status: Final result Specimen: Urine, Clean Catch Updated: 11/03/23 0133     Urine Culture >100,000 CFU/mL Citrobacter freundii     Narrative:      Colonization of the urinary tract without infection is common. Treatment is discouraged unless the patient is symptomatic, pregnant, or undergoing an invasive urologic procedure.      Susceptibility        Citrobacter freundii      WILY      Cefazolin Resistant      Cefepime Susceptible      Ceftazidime Resistant      Ceftriaxone Resistant      Gentamicin Susceptible      Levofloxacin Susceptible      Nitrofurantoin Intermediate      Piperacillin + Tazobactam Resistant      Trimethoprim + Sulfamethoxazole Susceptible                           Respiratory Panel PCR w/COVID-19(SARS-CoV-2) JAN/BEATRIZ/MICHAEL/PAD/COR/MAD/YARON In-House, NP Swab in UTM/VTM, 3-4 HR TAT - Swab, Nasopharynx [[id number]]  (Normal) Collected: 10/30/23 0153    Lab Status: Final result Specimen: Swab from Nasopharynx Updated: 10/30/23 0246     ADENOVIRUS, PCR Not Detected     Coronavirus 229E Not Detected     Coronavirus HKU1 Not Detected     Coronavirus NL63 Not Detected     Coronavirus OC43 Not Detected     COVID19 Not Detected     Human Metapneumovirus Not Detected     Human Rhinovirus/Enterovirus Not Detected     Influenza A PCR Not Detected     Influenza B PCR Not Detected     Parainfluenza Virus 1 Not Detected     Parainfluenza Virus 2 Not Detected     Parainfluenza Virus 3 Not Detected     Parainfluenza Virus 4 Not Detected     RSV, PCR Not Detected     Bordetella pertussis pcr Not Detected     Bordetella parapertussis PCR Not Detected     Chlamydophila pneumoniae PCR Not Detected     Mycoplasma pneumo by PCR Not Detected    Narrative:      In the setting of a positive respiratory panel with a viral infection PLUS a negative procalcitonin without other underlying concern for bacterial infection, consider observing off antibiotics or discontinuation of antibiotics and continue supportive care. If the respiratory panel is positive for atypical bacterial infection (Bordetella pertussis, Chlamydophila pneumoniae, or Mycoplasma  pneumoniae), consider antibiotic de-escalation to target atypical bacterial infection.              Results for orders placed during the hospital encounter of 07/25/21    Adult Transthoracic Echo Complete W/ Cont if Necessary Per Protocol    Interpretation Summary  · Left ventricular wall thickness is consistent with mild posterior asymmetric hypertrophy.  · The right atrial cavity is mildly dilated.  · Left ventricular ejection fraction appears to be 56 - 60%.      Time spent on Discharge including face to face service: Greater than 30 minutes      Electronically signed by Arsalan Serrano MD, 11/08/23, 11:41 AM EST.        Electronically signed by Arsalan Serrano MD at 11/08/23 8661

## 2023-11-10 NOTE — PAYOR COMM NOTE
"SUBJECT: ADDITIONAL CLINICALS FOR HOSPITAL  TO  SKILLED REHAB   -  AMBULANCE TRANSPORTATION AUTHORIZATION REQUEST    REFERENCE#:[id number]    PATIENT'S NAME:     Niall SANCHEZ [facility] MRN:     [id number]     DOS:     11/8/2023    INSURANCE MEMBER ID:     [id number]        Niall Mak (63 y.o. Male)       Date of Birth   1960    Social Security Number       Address   [address] [place] [id number]    Home Phone   [phone]    MRN   [id number]       Lutheran   None    Marital Status                               Admission Date   11/1/23    Admission Type   Emergency    Admitting Provider   Manuelito Garcia DO    Attending Provider       Department, Room/Bed   [address], 3013/1       Discharge Date   11/8/2023    Discharge Disposition   Rehab Facility or Unit (DC - External)    Discharge Destination                                 Attending Provider: (none)   Allergies: Cucumber Extract, Influenza Virus Vaccine, Sitagliptin, Strawberry, Tomato, Nicotine    Isolation: None   Infection: None   Code Status: Prior    Ht: 195.6 cm (77\")   Wt: 214 kg (471 lb 5.5 oz)    Admission Cmt: None   Principal Problem: Weakness [R53.1]                   Active Insurance as of 11/1/2023       Primary Coverage       Payor Plan Insurance Group Employer/Plan Group    ANTHEM MEDICARE REPLACEMENT ANTHEM MEDICARE ADVANTAGE [id number]       Payor Plan Address Payor Plan Phone Number Payor Plan Fax Number Effective Dates    [address] [phone]  1/1/2023 - None Entered    [facility] [id number]         Subscriber Name Subscriber Birth Date Member ID       NIALL MAK 1960 [id number]                     Emergency Contacts        (Rel.) Home Phone Work Phone Mobile Phone    Erika Mak (Spouse) [phone] -- --                       Discharge Summary        Arsalan Serrano MD at 11/08/23 [address] [place] [address] "         HOSPITALIST  DISCHARGE SUMMARY    Patient Name: Niall Mak  : 1960  MRN: [id number]    Date of Admission: 2023  Date of Discharge:  2023  Primary Care Physician: Shai Azar PA    Consults       Date and Time Order Name Status Description    2023  2:49 PM Inpatient Hospitalist Consult      10/29/2023  1:24 PM Inpatient Neurology Consult General Completed             Active and Resolved Hospital Problems:  Generalized weakness/unable to walk  Citrobacter UTI  Morbid obesity with BMI 55 (214 kg)  History of chronic atrial fibrillation  History of diastolic CHF  DM with neuropathy  Chronic venous stasis changes lower extremities  Horizontal diplopia, per neurology 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 male with severe morbid obesity presents with unable to ambulate around the house/general weakness.  He was recently discharged home from the hospital about 3 days ago.  At that time rehab was recommended but he refused.  Since going home he has not gotten up out of bed.  He was brought back to the ED due to inability to ambulate or get out of bed. In the ED he has no acute findings but is unable to ambulate.  Patient was evaluated by PT/OT, recommended subacute rehab, patient was excepted and is discharged to rehab today in stable condition.    Day of Discharge     Vital Signs:  Temp:  [97.5 °F (36.4 °C)-98.6 °F (37 °C)] 98.6 °F (37 °C)  Heart Rate:  [81-89] 84  Resp:  [20] 20  BP: (133-157)/(63-83) 157/79    Physical Exam:   GEN: No acute distress  HEENT: Moist mucous membranes  LUNGS: Equal chest rise bilaterally  CARDIAC: Regular rate and rhythm  NEURO: Moving all 4 extremities spontaneously  SKIN: No obvious breakdown    Discharge Details        Discharge Medications        Continue These Medications        Instructions Start Date   albuterol sulfate  (90 Base)  MCG/ACT inhaler  Commonly known as: PROVENTIL HFA;VENTOLIN HFA;PROAIR HFA   2 puffs, Inhalation, Every 6 Hours PRN      allopurinol 300 MG tablet  Commonly known as: ZYLOPRIM   300 mg, Oral, Daily      Cinnamon 500 MG capsule   500 mg, Oral, 2 times daily      digoxin 250 MCG tablet  Commonly known as: LANOXIN   250 mcg, Oral, Daily      dilTIAZem  MG 24 hr capsule  Commonly known as: CARDIZEM CD   240 mg, Oral, Daily      Eliquis 5 MG tablet tablet  Generic drug: apixaban   1 tablet, Oral, 2 Times Daily      fluticasone 50 MCG/ACT nasal spray  Commonly known as: FLONASE   1 spray, Nasal, Daily      furosemide 40 MG tablet  Commonly known as: LASIX   1 tablet, Oral, Daily      gabapentin 600 MG tablet  Commonly known as: NEURONTIN   600 mg, Oral, 3 Times Daily      gabapentin 300 MG capsule  Commonly known as: NEURONTIN   Take 1 capsule by mouth 3 (Three) Times a Day.      glimepiride 4 MG tablet  Commonly known as: AMARYL   4 mg, Oral, 2 Times Daily      HYDROcodone-acetaminophen 5-325 MG per tablet  Commonly known as: NORCO   1 tablet, Oral, Every 12 Hours PRN      metFORMIN 500 MG tablet  Commonly known as: GLUCOPHAGE   1,000 mg, Oral, 2 Times Daily With Meals, Take 2 tablets twice a day      metoprolol succinate  MG 24 hr tablet  Commonly known as: TOPROL-XL   Take 1 tablet by mouth Daily.      potassium chloride 10 MEQ CR tablet   10 mEq, Oral, Daily      pyridostigmine 60 MG tablet  Commonly known as: MESTINON   30 mg, Oral, 3 Times Daily      Turmeric 400 MG capsule   400 mg, Oral, Daily      ZyrTEC Allergy 10 MG capsule  Generic drug: Cetirizine HCl   10 mg, Oral, Daily               Allergies   Allergen Reactions    Cucumber Extract Unknown - High Severity and Other (See Comments)    Influenza Virus Vaccine Unknown - High Severity    Sitagliptin Unknown - High Severity    [place] Unknown - High Severity    Tomato Unknown - High Severity and Other (See Comments)    Nicotine Rash        Discharge Disposition:  Rehab Facility or Unit (DC - External)    Diet:  Hospital:  Diet Order   Procedures    Diet: Regular/House Diet; Texture: Regular Texture (IDDSI 7); Fluid Consistency: Thin (IDDSI 0)       Discharge Activity:       CODE STATUS:  Code Status and Medical Interventions:   Ordered at: 11/01/23 1550     Code Status (Patient has no pulse and is not breathing):    CPR (Attempt to Resuscitate)     Medical Interventions (Patient has pulse or is breathing):    Full Support       Future Appointments   Date Time Provider Department Center   1/2/2024 11:15 AM Hector Valerio MD [facility]       Additional Instructions for the Follow-ups that You Need to Schedule       Discharge Follow-up with PCP   As directed       Currently Documented PCP:    Shai Azar PA    PCP Phone Number:    [phone]     Follow Up Details: 3 to 7 days                Pertinent  and/or Most Recent Results     IMAGING:  XR Chest 1 View    Result Date: 11/1/2023  PROCEDURE: XR CHEST 1 VW  COMPARISON: [facility], , XR CHEST 1 VW, 7/07/2023, 12:58.  INDICATIONS: generalized weakness. sob  FINDINGS:  Study is limited by patient positioning, rotation.  Study also limited by body habitus Heart size is enlarged, accentuated by technique.  There is mild pulmonary vascular congestion.  Evaluation of the left lung bases limited.  Mild atelectasis is question.  No focal consolidation otherwise noted.  Osseous structures unremarkable        1. Cardiomegaly and mild pulmonary vascular congestion.  No definite overt pulmonary edema noted. 2. Suspected atelectasis left lung base.  Lungs are otherwise grossly clear given limitations of the exam       BRIANNE HANNA MD       Electronically Signed and Approved By: BRIANNE HANNA MD on 11/01/2023 at 15:35             MRI Brain Without Contrast    Result Date: 10/29/2023  PROCEDURE: MRI BRAIN WO CONTRAST  COMPARISON: None  INDICATIONS: dizziness,  unsteady gait, visual disturbance      TECHNIQUE: A variety of imaging planes and parameters were utilized for visualization of suspected pathology.  Images were performed without contrast.  FINDINGS:  No focal abnormal diffusion restriction is observed to indicate acute focal ischemia. Mild nonspecific periventricular, deep white matter, and subcortical white matter signal changes are seen bilaterally likely related to chronic small vessel ischemic changes or age-related changes. Mild associated diffuse volume loss is observed. These findings are age-appropriate. There is no evidence for abnormal focal enhancement or abnormal enhancing mass. No abnormal cerebral edema is seen. No mass effect or midline shift is observed. The midline structures are unremarkable. The basilar cisterns remain patent. No significant extra-axial fluid collections are identified. The expected signal voids of the intracranial vessels are noted.  No significant abnormal signal is observed corresponding to the paranasal sinuses or mastoid air cells.        1. No evidence for acute focal ischemia. 2. No evidence for abnormal focal enhancement or abnormal enhancing mass. 3. Nonspecific white matter signal changes seen bilaterally likely related to chronic small vessel ischemic changes or age-related changes. Associated diffuse volume loss is observed.        MOE MURPHY MD       Electronically Signed and Approved By: MOE MURPHY MD on 10/29/2023 at 16:12             CT Head Without Contrast    Result Date: 10/29/2023  PROCEDURE: CT HEAD WO CONTRAST  COMPARISON:  [facility], CT, HEAD W/O CONTRAST, 11/29/2015, 4:38. INDICATIONS: Blurred vision, headache  PROTOCOL:   Standard imaging protocol performed    RADIATION:   DLP: 927 mGy*cm   MA and/or KV was adjusted to minimize radiation dose.     TECHNIQUE: After obtaining the patient's consent, CT images were obtained without non-ionic intravenous contrast material.  FINDINGS:   There is no evidence for acute intracranial hemorrhage. No definitive acute focal ischemia is observed. There is no evidence for abnormal cerebral edema. No mass effect or midline shift is seen. The ventricular system is nondilated. The basilar cisterns are patent. The skull is intact without displaced fracture. The paranasal sinuses and mastoid air cells are clear.        1. No evidence for acute intracranial abnormality.     MOE MURPHY MD       Electronically Signed and Approved By: MOE MURPHY MD on 10/29/2023 at 12:13               LAB RESULTS:      Lab 11/07/23  0512 11/06/23  0441 11/05/23  0502 11/03/23 0453 11/02/23 0518 11/01/23  2332   WBC 9.65 8.86 9.24 9.23 8.36  --    HEMOGLOBIN 15.0 15.4 14.9 14.5 13.3  --    HEMATOCRIT 46.7 47.6 46.3 46.3 41.9  --    PLATELETS 341 337 347 331 299  --    NEUTROS ABS 7.19* 6.49 6.85 6.65 6.04  --    IMMATURE GRANS (ABS) 0.04 0.04 0.05 0.06* 0.05  --    LYMPHS ABS 1.32 1.33 1.28 1.39 1.30  --    MONOS ABS 0.74 0.67 0.71 0.72 0.68  --    EOS ABS 0.30 0.30 0.30 0.35 0.25  --    MCV 91.0 92.1 90.3 93.2 91.3  --    LACTATE  --   --   --   --   --  1.3         Lab 11/07/23  0512 11/06/23 0441 11/05/23  0502 11/03/23 0453 11/02/23 0518   SODIUM 131* 137 135* 137 136   POTASSIUM 4.1 4.3 4.0 4.2 3.6   CHLORIDE 95* 98 97* 98 101   CO2 28.0 30.3* 29.4* 30.8* 27.3   ANION GAP 8.0 8.7 8.6 8.2 7.7   BUN 12 11 10 12 11   CREATININE 0.82 0.74* 0.73* 0.92 0.75*   EGFR 98.7 101.8 102.2 93.5 101.4   GLUCOSE 254* 263* 270* 251* 216*   CALCIUM 9.7 9.6 9.6 9.6 9.3   MAGNESIUM 1.8 1.9 1.8 1.9  --    PHOSPHORUS 3.5 3.2 3.1 3.8  --          Lab 11/07/23  0512 11/06/23  0441 11/03/23  0453 11/01/23  1410   TOTAL PROTEIN 7.5 7.5 7.5 7.8   ALBUMIN 3.6 3.6 3.8 3.7   GLOBULIN  --   --   --  4.1   ALT (SGPT) 18 16 16 14   AST (SGOT) 15 13 16 18   BILIRUBIN 0.5 0.4 0.3 0.5   BILIRUBIN DIRECT <0.2 <0.2 <0.2  --    ALK PHOS 67 65 71 57   LIPASE  --   --   --  26                     Brief  Urine Lab Results  (Last result in the past 365 days)        Color   Clarity   Blood   Leuk Est   Nitrite   Protein   CREAT   Urine HCG        11/01/23 1548 Dark Yellow   Cloudy   Negative   Small (1+)   Positive   30 mg/dL (1+)                 Microbiology Results (last 10 days)       Procedure Component Value - Date/Time    Blood Culture - Blood, Arm, Left [[id number]]  (Normal) Collected: 11/01/23 2332    Lab Status: Final result Specimen: Blood from Arm, Left Updated: 11/06/23 2245     Blood Culture No growth at 5 days    Blood Culture - Blood, Hand, Left [[id number]]  (Normal) Collected: 11/01/23 2332    Lab Status: Final result Specimen: Blood from Hand, Left Updated: 11/06/23 2245     Blood Culture No growth at 5 days    Clostridioides difficile Toxin - Stool, Per Rectum [[id number]] Collected: 11/01/23 1638    Lab Status: Final result Specimen: Stool from Per Rectum Updated: 11/01/23 1744    Narrative:      The following orders were created for panel order Clostridioides difficile Toxin - Stool, Per Rectum.  Procedure                               Abnormality         Status                     ---------                               -----------         ------                     Clostridioides difficile...[[id number]]                      Final result                 Please view results for these tests on the individual orders.    Enteric Bacterial Panel - Stool, Per Rectum [[id number]]  (Normal) Collected: 11/01/23 1638    Lab Status: Final result Specimen: Stool from Per Rectum Updated: 11/02/23 1127     Salmonella Not Detected     Campylobacter Not Detected     Shigella/Enteroinvasive E. coli (EIEC) Not Detected     Shiga-like toxin-producing E. coli (STEC) stx1/stx2 Not Detected    Clostridioides difficile Toxin, PCR - Stool, Per Rectum [[id number]] Collected: 11/01/23 1638    Lab Status: Final result Specimen: Stool from Per Rectum Updated: 11/01/23 1744     Toxigenic C. difficile by PCR Negative     027  Toxin Presumptive Negative    Narrative:      The result indicates the absence of toxigenic C. difficile from stool specimen.     Urine Culture - Urine, Urine, Clean Catch [[id number]]  (Abnormal)  (Susceptibility) Collected: 11/01/23 1548    Lab Status: Final result Specimen: Urine, Clean Catch Updated: 11/03/23 0133     Urine Culture >100,000 CFU/mL Citrobacter freundii    Narrative:      Colonization of the urinary tract without infection is common. Treatment is discouraged unless the patient is symptomatic, pregnant, or undergoing an invasive urologic procedure.      Susceptibility        Citrobacter freundii      WILY      Cefazolin Resistant      Cefepime Susceptible      Ceftazidime Resistant      Ceftriaxone Resistant      Gentamicin Susceptible      Levofloxacin Susceptible      Nitrofurantoin Intermediate      Piperacillin + Tazobactam Resistant      Trimethoprim + Sulfamethoxazole Susceptible                           Respiratory Panel PCR w/COVID-19(SARS-CoV-2) JNA/BEATRIZ/MICHAEL/PAD/COR/MAD/YARON In-House, NP Swab in UTM/VTM, 3-4 HR TAT - Swab, Nasopharynx [[id number]]  (Normal) Collected: 10/30/23 0153    Lab Status: Final result Specimen: Swab from Nasopharynx Updated: 10/30/23 0246     ADENOVIRUS, PCR Not Detected     Coronavirus 229E Not Detected     Coronavirus HKU1 Not Detected     Coronavirus NL63 Not Detected     Coronavirus OC43 Not Detected     COVID19 Not Detected     Human Metapneumovirus Not Detected     Human Rhinovirus/Enterovirus Not Detected     Influenza A PCR Not Detected     Influenza B PCR Not Detected     Parainfluenza Virus 1 Not Detected     Parainfluenza Virus 2 Not Detected     Parainfluenza Virus 3 Not Detected     Parainfluenza Virus 4 Not Detected     RSV, PCR Not Detected     Bordetella pertussis pcr Not Detected     Bordetella parapertussis PCR Not Detected     Chlamydophila pneumoniae PCR Not Detected     Mycoplasma pneumo by PCR Not Detected    Narrative:      In the setting of a  positive respiratory panel with a viral infection PLUS a negative procalcitonin without other underlying concern for bacterial infection, consider observing off antibiotics or discontinuation of antibiotics and continue supportive care. If the respiratory panel is positive for atypical bacterial infection (Bordetella pertussis, Chlamydophila pneumoniae, or Mycoplasma pneumoniae), consider antibiotic de-escalation to target atypical bacterial infection.              Results for orders placed during the hospital encounter of 07/25/21    Adult Transthoracic Echo Complete W/ Cont if Necessary Per Protocol    Interpretation Summary  · Left ventricular wall thickness is consistent with mild posterior asymmetric hypertrophy.  · The right atrial cavity is mildly dilated.  · Left ventricular ejection fraction appears to be 56 - 60%.      Time spent on Discharge including face to face service: Greater than 30 minutes      Electronically signed by Arsalan Serrano MD, 11/08/23, 11:41 AM EST.        Electronically signed by Arsalan Serrano MD at 11/08/23 1145

## 2023-11-14 LAB
ACHR BIND AB SER-SCNC: 0.04 NMOL/L (ref 0–0.24)
ACHR BLOCK AB SER-ACNC: 21 % (ref 0–25)
ACHR MOD AB SER QL FC: 0 % (ref 0–45)
MUSK AB SER IA-ACNC: <1 U/ML
REFLEX INFORMATION: NORMAL
STRIA MUS AB TITR SER IF: NEGATIVE {TITER}

## 2024-03-29 ENCOUNTER — OFFICE VISIT (OUTPATIENT)
Dept: NEUROLOGY | Facility: CLINIC | Age: 64
End: 2024-03-29
Payer: MEDICARE

## 2024-03-29 ENCOUNTER — PATIENT ROUNDING (BHMG ONLY) (OUTPATIENT)
Dept: NEUROLOGY | Facility: CLINIC | Age: 64
End: 2024-03-29
Payer: MEDICARE

## 2024-03-29 VITALS
DIASTOLIC BLOOD PRESSURE: 60 MMHG | SYSTOLIC BLOOD PRESSURE: 115 MMHG | HEART RATE: 83 BPM | WEIGHT: 315 LBS | HEIGHT: 77 IN | BODY MASS INDEX: 37.19 KG/M2

## 2024-03-29 DIAGNOSIS — H49.20 6TH NERVE PALSY, UNSPECIFIED LATERALITY: Primary | ICD-10-CM

## 2024-03-29 PROCEDURE — 99203 OFFICE O/P NEW LOW 30 MIN: CPT | Performed by: PSYCHIATRY & NEUROLOGY

## 2024-03-29 PROCEDURE — 1159F MED LIST DOCD IN RCRD: CPT | Performed by: PSYCHIATRY & NEUROLOGY

## 2024-03-29 PROCEDURE — 1160F RVW MEDS BY RX/DR IN RCRD: CPT | Performed by: PSYCHIATRY & NEUROLOGY

## 2024-03-29 NOTE — PROGRESS NOTES
"Chief Complaint  Diplopia ([facility] [place] 10/29/23 & 11/01/23)    Subjective          Niall Mak is a 63 y.o. male who presents to [facility]  History of Present Illness  63-year-old man evaluated for diplopia.  He states that in September in the beginning of October he started seeing double vision side-to-side.  It lasted for 2 months.  He got better throughout those 2 months.  He does not remember it being worse looking to the right or to the left.  He states that he also felt weak in his upper and lower extremities bilaterally and he could not walk and he was found to have a UTI.  MRI of the brain was unremarkable.  He was seen at the hospital and the diagnosis of 6th nerve palsy versus myasthenia was made.  His myasthenia gravis profile and musk was negative.  He    He states that he developed COVID while in rehab in [place].  He never had swallowing difficulties.    Objective   Vital Signs:   /60   Pulse 83   Ht 195.6 cm (77.01\")   Wt (!) 213 kg (470 lb)   BMI 55.72 kg/m²     Physical Exam   Alert, fluent, phasic, follows commands well.  Visual fields are full, EMs full directions gaze, pupils symmetrical and equally reactive to light.  Facial strength is full, soft palate elevation and tongue are normal.  There is no weakness of the upper or lower extremities on individual muscle testing.  Reflexes absent.  Station gait he is using a walker to ambulate.        Assessment and Plan  Diagnoses and all orders for this visit:    1. 6th nerve palsy, unspecified laterality (Primary)  Assessment & Plan:  Discussed with him that he had VI nerve palsy and not myasthenia.  He has recovered.  He has never had a stroke before.  He is on Eliquis for atrial fibrillation.  I will see him again in the future as needed.  Thank you for let me participate in his care.           Total time spent with the patient and coordinating patient care was 35 minutes.    Follow Up  No " follow-ups on file.  Patient was given instructions and counseling regarding his condition or for health maintenance advice. Please see specific information pulled into the AVS if appropriate.

## 2024-03-29 NOTE — ASSESSMENT & PLAN NOTE
Discussed with him that he had VI nerve palsy and not myasthenia.  He has recovered.  He has never had a stroke before.  He is on Eliquis for atrial fibrillation.  I will see him again in the future as needed.  Thank you for let me participate in his care.

## 2024-04-15 ENCOUNTER — OFFICE VISIT (OUTPATIENT)
Dept: PODIATRY | Facility: CLINIC | Age: 64
End: 2024-04-15
Payer: MEDICARE

## 2024-04-15 VITALS
HEIGHT: 77 IN | OXYGEN SATURATION: 93 % | SYSTOLIC BLOOD PRESSURE: 155 MMHG | DIASTOLIC BLOOD PRESSURE: 88 MMHG | HEART RATE: 86 BPM | WEIGHT: 315 LBS | TEMPERATURE: 98.2 F | BODY MASS INDEX: 37.19 KG/M2

## 2024-04-15 DIAGNOSIS — E11.9 DIABETES MELLITUS WITHOUT COMPLICATION: ICD-10-CM

## 2024-04-15 DIAGNOSIS — R26.2 DIFFICULTY WALKING: Primary | ICD-10-CM

## 2024-04-15 DIAGNOSIS — E11.8 DM FEET: ICD-10-CM

## 2024-04-15 DIAGNOSIS — B35.1 ONYCHOMYCOSIS: ICD-10-CM

## 2024-04-15 DIAGNOSIS — L60.0 ONYCHOCRYPTOSIS: ICD-10-CM

## 2024-04-15 DIAGNOSIS — M79.671 FOOT PAIN, BILATERAL: ICD-10-CM

## 2024-04-15 DIAGNOSIS — M79.672 FOOT PAIN, BILATERAL: ICD-10-CM

## 2024-04-15 PROCEDURE — 1160F RVW MEDS BY RX/DR IN RCRD: CPT | Performed by: PODIATRIST

## 2024-04-15 PROCEDURE — 1159F MED LIST DOCD IN RCRD: CPT | Performed by: PODIATRIST

## 2024-04-15 PROCEDURE — 99213 OFFICE O/P EST LOW 20 MIN: CPT | Performed by: PODIATRIST

## 2024-04-15 PROCEDURE — 11721 DEBRIDE NAIL 6 OR MORE: CPT | Performed by: PODIATRIST

## 2024-04-15 NOTE — PROGRESS NOTES
[facility] - PODIATRY    Today's Date: 04/15/24    Patient Name: Niall Mak  MRN: [id number]  CSN: [id number]  PCP: Shai Azar PA, Last PCP Visit: 1 March 2024  Referring Provider: No ref. provider found    SUBJECTIVE     HPI: Niall Mak, a 63 y.o.male, presents to clinic for painful toenail and a diabetic foot evaluation.    New, Established, New Problem:  Established  Location:  Toenails  Duration:   Greater than five years  Onset:  Gradual  Nature:  sore with palpation.  Stable, worsening, improving:   Recurring  Aggravating factors:  Pain with shoe gear and ambulation.  Previous Treatment:  Debridement    Patient controlling diabetes via: Oral medication    Patient states their most recent blood glucose reading was  212.    Patient denies any fevers, chills, nausea, vomiting, shortness of breath, nor any other constitutional signs nor symptoms.    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    I have reviewed/confirmed previously documented HPI with no changes.     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     Past Surgical History:   Procedure Laterality Date    COLONOSCOPY      TONSILLECTOMY       Family History   Problem Relation Age of Onset    Cancer Father      Social History     Socioeconomic History    Marital status:    Tobacco Use    Smoking status: Never     Passive exposure: Never    Smokeless tobacco: Never   Vaping Use    Vaping status: Never Used   Substance and Sexual Activity    Alcohol use: Not Currently    Drug use: Never    Sexual activity: Not Currently     Partners: Female     Allergies   Allergen Reactions    Cucumber Extract Unknown - High Severity and Other (See Comments)    Influenza  Virus Vaccine Unknown - High Severity    Sitagliptin Unknown - High Severity    [place] Unknown - High Severity    Tomato Unknown - High Severity and Other (See Comments)    Nicotine Rash     Current Outpatient Medications   Medication Sig Dispense Refill    albuterol sulfate  (90 Base) MCG/ACT inhaler Inhale 2 puffs Every 6 (Six) Hours As Needed.      allopurinol (ZYLOPRIM) 300 MG tablet Take 1 tablet by mouth Daily.      Cetirizine HCl (ZyrTEC Allergy) 10 MG capsule Take 10 mg by mouth Daily.      Cinnamon 500 MG capsule Take 1 capsule by mouth 2 (two) times a day.      digoxin (LANOXIN) 250 MCG tablet Take 1 tablet by mouth Daily.      dilTIAZem CD (CARDIZEM CD) 240 MG 24 hr capsule Take 1 capsule by mouth Daily.      Eliquis 5 MG tablet tablet Take 1 tablet by mouth 2 (Two) Times a Day.      fluticasone (FLONASE) 50 MCG/ACT nasal spray 1 spray into the nostril(s) as directed by provider Daily.      furosemide (LASIX) 40 MG tablet Take 1 tablet by mouth Daily.      gabapentin (NEURONTIN) 300 MG capsule Take 1 capsule by mouth 3 (Three) Times a Day.      gabapentin (NEURONTIN) 600 MG tablet Take 1 tablet by mouth 3 (Three) Times a Day.      glimepiride (AMARYL) 4 MG tablet Take 1 tablet by mouth 2 (Two) Times a Day.      HYDROcodone-acetaminophen (NORCO) 5-325 MG per tablet Take 1 tablet by mouth Every 12 (Twelve) Hours As Needed.      metFORMIN (GLUCOPHAGE) 500 MG tablet Take 2 tablets by mouth 2 (Two) Times a Day With Meals. Take 2 tablets twice a day      metoprolol succinate XL (TOPROL-XL) 100 MG 24 hr tablet Take 1 tablet by mouth Daily.      potassium chloride 10 MEQ CR tablet Take 1 tablet by mouth Daily.      Turmeric 400 MG capsule Take 400 mg by mouth Daily.      pyridostigmine (MESTINON) 60 MG tablet Take 0.5 tablets by mouth 3 (Three) Times a Day for 30 days. 45 tablet 0     No current facility-administered medications for this visit.     Review of Systems   Constitutional: Negative.    Skin:          Painful toenails   Neurological:  Positive for numbness.   All other systems reviewed and are negative.      OBJECTIVE     Vitals:    04/15/24 1450   BP: 155/88   Pulse: 86   Temp: 98.2 °F (36.8 °C)   SpO2: 93%         Body mass index is 55.13 kg/m².    Lab Results   Component Value Date    HGBA1C 7.8 (H) 03/01/2024       Lab Results   Component Value Date    GLUCOSE 254 (H) 11/07/2023    CALCIUM 9.7 11/07/2023     (L) 11/07/2023    K 4.1 11/07/2023    CO2 28.0 11/07/2023    CL 95 (L) 11/07/2023    BUN 12 11/07/2023    CREATININE 0.82 11/07/2023    EGFRIFAFRI >60 06/13/2022    EGFRIFNONA 94 08/01/2021    BCR 14.6 11/07/2023    ANIONGAP 8.0 11/07/2023       Patient seen in no apparent distress.      PHYSICAL EXAM:     Foot/Ankle Exam    GENERAL  Diabetic foot exam performed    Appearance:  chronically ill (Morbidly obese obesity)  Orientation:  AAOx3  Affect:  appropriate  Gait:  antalgic  Assistance:  walker  Right shoe gear: diabetic shoe  Left shoe gear: diabetic shoe    VASCULAR     Right Foot Vascularity   Dorsalis pedis:  Absent (Palpable due to lower extremity edema)  Posterior tibial:  Absent  Skin temperature:  warm  Edema grading:  Pitting and 4+  CFT:  < 3 seconds  Pedal hair growth:  Absent  Varicosities:  severe varicosities     Left Foot Vascularity   Dorsalis pedis:  Absent (Nonpalpable due to lower extremity edema)  Posterior tibial:  Absent  Skin temperature:  warm  Edema grading:  Pitting and 4+  CFT:  < 3 seconds  Pedal hair growth:  Absent  Varicosities:  severe varicosities     NEUROLOGIC     Right Foot Neurologic   Light touch sensation: diminished  Vibratory sensation: diminished  Hot/Cold sensation: diminished  Protective Sensation using [place] Monofilament:   Sites intact: 3  Sites tested: 10     Left Foot Neurologic   Light touch sensation: diminished  Vibratory sensation: diminished  Hot/Cold sensation:  diminished  Protective Sensation using [place]  Monofilament:   Sites intact: 2  Sites tested: 10    MUSCLE STRENGTH     Right Foot Muscle Strength   Foot dorsiflexion:  4-  Foot plantar flexion:  4-  Foot inversion:  4-  Foot eversion:  4-     Left Foot Muscle Strength   Foot dorsiflexion:  4-  Foot plantar flexion:  4-  Foot inversion:  4-  Foot eversion:  4-    RANGE OF MOTION     Right Foot Range of Motion   Foot and ankle ROM within normal limits       Left Foot Range of Motion   Foot and ankle ROM within normal limits      DERMATOLOGIC      Right Foot Dermatologic   Skin  Right foot skin is intact.   Nails  1.  Positive for elongated, onychomycosis, abnormal thickness, subungual debris and ingrown toenail.  2.  Positive for elongated, onychomycosis, abnormal thickness, subungual debris and ingrown toenail.  3.  Positive for elongated, onychomycosis, abnormal thickness, subungual debris and ingrown toenail.  4.  Positive for elongated, onychomycosis, abnormal thickness, subungual debris and ingrown toenail.  5.  Positive for elongated, onychomycosis, abnormal thickness, subungual debris and ingrown toenail.     Left Foot Dermatologic   Skin  Left foot skin is intact.   Nails  2.  Positive for elongated, onychomycosis, abnormal thickness, subungual debris and ingrown toenail.  3.  Positive for elongated, onychomycosis, abnormal thickness, subungual debris and ingrown toenail.  4.  Positive for elongated, onychomycosis, abnormally thick, subungual debris and ingrown toenail.  5.  Positive for elongated, onychomycosis, abnormally thick, subungual debris and ingrown toenail.     Right foot additional comments: Healing ulceration on right lateral forefoot.  No drainage.  No signs of edema, erythema, lymphangitis, nor signs of infection.      Diabetic Foot Exam Performed and Monofilament Test Performed    I have reexamined the patient the results are consistent with the previously documented exam.    ASSESSMENT/PLAN     Diagnoses and all orders for this  visit:    1. Difficulty walking (Primary)    2. Onychocryptosis    3. Onychomycosis    4. Foot pain, bilateral    5. Diabetes mellitus without complication    6. DM feet    Comprehensive lower extremity examination and evaluation was performed.    Discussed findings and treatment plan including risks, benefits, and treatment options with patient in detail. Patient agreed with treatment plan.    Medications and allergies reviewed.  Reviewed available blood glucose and HgB A1C lab values along with other pertinent labs.  These were discussed with the patient as to their importance of diabetic maintenance.    Toenails 1, 2, 3, 4, 5 on Right and 2, 3, 4, 5 on Left were debrided with nail nippers then filed with a In[place]amel nail silvia.  Patient tolerated procedure well without complications.    Diabetic foot exam performed and documented this date, compliant with CQM required standards. Detail of findings as noted in physical exam.  Lower extremity Neurologic exam for diabetic patient performed and documented this date, compliant with PQRS required standards. Detail of findings as noted in physical exam.  Advised patient importance of good routine lower extremity hygiene. Advised patient importance of evaluating for intact skin and pain free nail borders.  Advised patient to use mirror to evaluate plantar/ soles of feet for better visualization. Advised patient monitor and phone office to be seen if any cracking to skin, open lesions, painful nail borders or if nails become elongated prior to next visit. Advised patient importance of daily cleansing of lower extremities, followed by good skin cream to maintain normal hydration of skin. Also advised patient importance of close daily monitoring of blood sugar. Advised to regulate diet and medications to maintain control of blood sugar in optimal range. Contact primary care provider if difficulties maintaining blood sugar levels.  Advised Patient of presence of Diabetes  Mell[place] condition.  Advised Patient risk of progression and worsening or improvement, then return of condition.  Will monitor condition for any change in future. Treat with most appropriate treatment pending status of condition.  Counseled and advised patient extensively on nature and ramifications of diabetes. Standard instructions given to patient for good diabetic foot care and maintenance. Advised importance of careful monitoring to avoid break down and complications secondary to diabetes. Advised patient importance of strict maintenance of blood sugar control. Advised patient of possible ominous results from neglect of condition, i.e.: amputation/ loss of digits, feet and legs, or even death.  Patient states understands counseling, will monitor closely, continue good hygiene and routine diabetic foot care. Patient will contact office should they have any questions or problems.      An After Visit Summary was printed and given to the patient at discharge, including (if requested) any available informative/educational handouts regarding diagnosis, treatment, or medications. All questions were answered to patient/family satisfaction. Should symptoms fail to improve or worsen they agree to call or return to clinic or to go to the Emergency Department. Discussed the importance of following up with any needed screening tests/labs/specialist appointments and any requested follow-up recommended by me today. Importance of maintaining follow-up discussed and patient accepts that missed appointments can delay diagnosis and potentially lead to worsening of conditions.    Return in about 9 weeks (around 6/17/2024) for Toenail Care., or sooner if acute issues arise.    I have reviewed the assessment and plan and verified the accuracy of it. No changes to assessment and plan since the information was documented. Des Ahumada DPM 04/15/24     I have dictated this note utilizing Dragon Dictation.  Please note that  portions of this note were completed with a voice recognition program.  Part of this note may be an electronic transcription/translation of spoken language to printed text using the Dragon Dictation System.        This document has been electronically signed by Des Ahumada DPM on April 15, 2024 15:11 EDT

## 2024-05-07 ENCOUNTER — LAB REQUISITION (OUTPATIENT)
Dept: LAB | Facility: HOSPITAL | Age: 64
End: 2024-05-07
Payer: MEDICARE

## 2024-05-07 DIAGNOSIS — N39.0 URINARY TRACT INFECTION, SITE NOT SPECIFIED: ICD-10-CM

## 2024-05-07 DIAGNOSIS — R30.0 DYSURIA: ICD-10-CM

## 2024-05-07 DIAGNOSIS — E11.9 TYPE 2 DIABETES MELLITUS WITHOUT COMPLICATIONS: ICD-10-CM

## 2024-05-07 LAB
BACTERIA UR QL AUTO: ABNORMAL /HPF
BACTERIA UR QL AUTO: ABNORMAL /HPF
BILIRUB UR QL STRIP: NEGATIVE
BILIRUB UR QL STRIP: NEGATIVE
CLARITY UR: ABNORMAL
CLARITY UR: ABNORMAL
COD CRY URNS QL: PRESENT /HPF
COD CRY URNS QL: PRESENT /HPF
COLOR UR: ABNORMAL
COLOR UR: ABNORMAL
GLUCOSE UR STRIP-MCNC: ABNORMAL MG/DL
GLUCOSE UR STRIP-MCNC: ABNORMAL MG/DL
HGB UR QL STRIP.AUTO: ABNORMAL
HGB UR QL STRIP.AUTO: ABNORMAL
HOLD SPECIMEN: NORMAL
HYALINE CASTS UR QL AUTO: ABNORMAL /LPF
HYALINE CASTS UR QL AUTO: ABNORMAL /LPF
KETONES UR QL STRIP: NEGATIVE
KETONES UR QL STRIP: NEGATIVE
LEUKOCYTE ESTERASE UR QL STRIP.AUTO: ABNORMAL
LEUKOCYTE ESTERASE UR QL STRIP.AUTO: ABNORMAL
NITRITE UR QL STRIP: NEGATIVE
NITRITE UR QL STRIP: NEGATIVE
PH UR STRIP.AUTO: 6 [PH] (ref 5–8)
PH UR STRIP.AUTO: 6 [PH] (ref 5–8)
PROT UR QL STRIP: ABNORMAL
PROT UR QL STRIP: ABNORMAL
RBC # UR STRIP: ABNORMAL /HPF
RBC # UR STRIP: ABNORMAL /HPF
REF LAB TEST METHOD: ABNORMAL
REF LAB TEST METHOD: ABNORMAL
SP GR UR STRIP: 1.02 (ref 1–1.03)
SP GR UR STRIP: 1.02 (ref 1–1.03)
SQUAMOUS #/AREA URNS HPF: ABNORMAL /HPF
SQUAMOUS #/AREA URNS HPF: ABNORMAL /HPF
UROBILINOGEN UR QL STRIP: ABNORMAL
UROBILINOGEN UR QL STRIP: ABNORMAL
WBC # UR STRIP: ABNORMAL /HPF
WBC # UR STRIP: ABNORMAL /HPF

## 2024-05-07 PROCEDURE — 81001 URINALYSIS AUTO W/SCOPE: CPT | Performed by: PHYSICIAN ASSISTANT

## 2024-05-07 PROCEDURE — 87086 URINE CULTURE/COLONY COUNT: CPT | Performed by: PHYSICIAN ASSISTANT

## 2024-05-08 ENCOUNTER — APPOINTMENT (OUTPATIENT)
Dept: CT IMAGING | Facility: HOSPITAL | Age: 64
End: 2024-05-08
Payer: MEDICARE

## 2024-05-08 ENCOUNTER — HOSPITAL ENCOUNTER (EMERGENCY)
Facility: HOSPITAL | Age: 64
Discharge: HOME | End: 2024-05-08
Attending: EMERGENCY MEDICINE
Payer: MEDICARE

## 2024-05-08 VITALS
SYSTOLIC BLOOD PRESSURE: 130 MMHG | WEIGHT: 315 LBS | BODY MASS INDEX: 37.19 KG/M2 | RESPIRATION RATE: 18 BRPM | HEART RATE: 96 BPM | TEMPERATURE: 98.4 F | HEIGHT: 77 IN | DIASTOLIC BLOOD PRESSURE: 68 MMHG | OXYGEN SATURATION: 94 %

## 2024-05-08 DIAGNOSIS — N20.0 KIDNEY STONE: ICD-10-CM

## 2024-05-08 DIAGNOSIS — R31.9 HEMATURIA, UNSPECIFIED TYPE: Primary | ICD-10-CM

## 2024-05-08 LAB
ALBUMIN SERPL-MCNC: 3.9 G/DL (ref 3.5–5.2)
ALBUMIN/GLOB SERPL: 1.2 G/DL
ALP SERPL-CCNC: 61 U/L (ref 39–117)
ALT SERPL W P-5'-P-CCNC: 14 U/L (ref 1–41)
ANION GAP SERPL CALCULATED.3IONS-SCNC: 14.2 MMOL/L (ref 5–15)
AST SERPL-CCNC: 13 U/L (ref 1–40)
BACTERIA UR QL AUTO: ABNORMAL /HPF
BASOPHILS # BLD AUTO: 0.04 10*3/MM3 (ref 0–0.2)
BASOPHILS NFR BLD AUTO: 0.4 % (ref 0–1.5)
BILIRUB SERPL-MCNC: 0.4 MG/DL (ref 0–1.2)
BILIRUB UR QL STRIP: NEGATIVE
BUN SERPL-MCNC: 11 MG/DL (ref 8–23)
BUN/CREAT SERPL: 12.8 (ref 7–25)
CALCIUM SPEC-SCNC: 9.5 MG/DL (ref 8.6–10.5)
CHLORIDE SERPL-SCNC: 98 MMOL/L (ref 98–107)
CLARITY UR: CLEAR
CO2 SERPL-SCNC: 25.8 MMOL/L (ref 22–29)
COLOR UR: YELLOW
CREAT SERPL-MCNC: 0.86 MG/DL (ref 0.76–1.27)
D-LACTATE SERPL-SCNC: 2.7 MMOL/L (ref 0.5–2)
D-LACTATE SERPL-SCNC: 4.7 MMOL/L (ref 0.5–2)
DEPRECATED RDW RBC AUTO: 51 FL (ref 37–54)
EGFRCR SERPLBLD CKD-EPI 2021: 96.7 ML/MIN/1.73
EOSINOPHIL # BLD AUTO: 0.21 10*3/MM3 (ref 0–0.4)
EOSINOPHIL NFR BLD AUTO: 2 % (ref 0.3–6.2)
ERYTHROCYTE [DISTWIDTH] IN BLOOD BY AUTOMATED COUNT: 15.1 % (ref 12.3–15.4)
GLOBULIN UR ELPH-MCNC: 3.3 GM/DL
GLUCOSE SERPL-MCNC: 233 MG/DL (ref 65–99)
GLUCOSE UR STRIP-MCNC: ABNORMAL MG/DL
HCT VFR BLD AUTO: 41.6 % (ref 37.5–51)
HGB BLD-MCNC: 13.5 G/DL (ref 13–17.7)
HGB UR QL STRIP.AUTO: ABNORMAL
HOLD SPECIMEN: NORMAL
HOLD SPECIMEN: NORMAL
HYALINE CASTS UR QL AUTO: ABNORMAL /LPF
IMM GRANULOCYTES # BLD AUTO: 0.05 10*3/MM3 (ref 0–0.05)
IMM GRANULOCYTES NFR BLD AUTO: 0.5 % (ref 0–0.5)
KETONES UR QL STRIP: ABNORMAL
LEUKOCYTE ESTERASE UR QL STRIP.AUTO: NEGATIVE
LIPASE SERPL-CCNC: 14 U/L (ref 13–60)
LYMPHOCYTES # BLD AUTO: 1.19 10*3/MM3 (ref 0.7–3.1)
LYMPHOCYTES NFR BLD AUTO: 11.1 % (ref 19.6–45.3)
MCH RBC QN AUTO: 30.2 PG (ref 26.6–33)
MCHC RBC AUTO-ENTMCNC: 32.5 G/DL (ref 31.5–35.7)
MCV RBC AUTO: 93.1 FL (ref 79–97)
MONOCYTES # BLD AUTO: 0.72 10*3/MM3 (ref 0.1–0.9)
MONOCYTES NFR BLD AUTO: 6.7 % (ref 5–12)
NEUTROPHILS NFR BLD AUTO: 79.3 % (ref 42.7–76)
NEUTROPHILS NFR BLD AUTO: 8.48 10*3/MM3 (ref 1.7–7)
NITRITE UR QL STRIP: NEGATIVE
NRBC BLD AUTO-RTO: 0 /100 WBC (ref 0–0.2)
PH UR STRIP.AUTO: 5.5 [PH] (ref 5–8)
PLATELET # BLD AUTO: 315 10*3/MM3 (ref 140–450)
PMV BLD AUTO: 10.2 FL (ref 6–12)
POTASSIUM SERPL-SCNC: 4.6 MMOL/L (ref 3.5–5.2)
PROT SERPL-MCNC: 7.2 G/DL (ref 6–8.5)
PROT UR QL STRIP: ABNORMAL
RBC # BLD AUTO: 4.47 10*6/MM3 (ref 4.14–5.8)
RBC # UR STRIP: ABNORMAL /HPF
REF LAB TEST METHOD: ABNORMAL
SODIUM SERPL-SCNC: 138 MMOL/L (ref 136–145)
SP GR UR STRIP: 1.02 (ref 1–1.03)
SQUAMOUS #/AREA URNS HPF: ABNORMAL /HPF
UROBILINOGEN UR QL STRIP: ABNORMAL
WBC # UR STRIP: ABNORMAL /HPF
WBC NRBC COR # BLD AUTO: 10.69 10*3/MM3 (ref 3.4–10.8)
WHOLE BLOOD HOLD COAG: NORMAL
WHOLE BLOOD HOLD SPECIMEN: NORMAL

## 2024-05-08 PROCEDURE — 87040 BLOOD CULTURE FOR BACTERIA: CPT

## 2024-05-08 PROCEDURE — 80053 COMPREHEN METABOLIC PANEL: CPT

## 2024-05-08 PROCEDURE — 85025 COMPLETE CBC W/AUTO DIFF WBC: CPT

## 2024-05-08 PROCEDURE — 25510000001 IOPAMIDOL PER 1 ML: Performed by: EMERGENCY MEDICINE

## 2024-05-08 PROCEDURE — 74177 CT ABD & PELVIS W/CONTRAST: CPT

## 2024-05-08 PROCEDURE — 81001 URINALYSIS AUTO W/SCOPE: CPT

## 2024-05-08 PROCEDURE — 83605 ASSAY OF LACTIC ACID: CPT | Performed by: EMERGENCY MEDICINE

## 2024-05-08 PROCEDURE — 36415 COLL VENOUS BLD VENIPUNCTURE: CPT

## 2024-05-08 PROCEDURE — 83605 ASSAY OF LACTIC ACID: CPT

## 2024-05-08 PROCEDURE — 99285 EMERGENCY DEPT VISIT HI MDM: CPT

## 2024-05-08 PROCEDURE — 83690 ASSAY OF LIPASE: CPT

## 2024-05-08 RX ORDER — CIPROFLOXACIN 500 MG/1
500 TABLET, FILM COATED ORAL 2 TIMES DAILY
COMMUNITY

## 2024-05-08 RX ORDER — SODIUM CHLORIDE 0.9 % (FLUSH) 0.9 %
10 SYRINGE (ML) INJECTION AS NEEDED
Status: DISCONTINUED | OUTPATIENT
Start: 2024-05-08 | End: 2024-05-08 | Stop reason: HOSPADM

## 2024-05-08 RX ADMIN — IOPAMIDOL 100 ML: 755 INJECTION, SOLUTION INTRAVENOUS at 19:09

## 2024-05-08 NOTE — ED PROVIDER NOTES
Time: 4:46 PM EDT  Date of encounter:  5/8/2024  Independent Historian/Clinical History and Information was obtained by:   Patient    History is limited by: N/A    Chief complaint: Hematuria        History of Present Illness:  Patient is a 64 y.o. year old male who presents to the emergency department for evaluation of hematuria.  Patient states he is having gross painless hematuria and states that he was sent here by his PCP because he had an abnormal urinalysis.  Patient notes that he started having grossly bloody urine yesterday.  He states the urine was bloody.  He had no passing of clots.  States that it has since resolved.  He is urinated twice now and has been clear.  He denies any back or flank pain.  The patient's had no nausea or vomiting.  Patient denies any abdominal pain.  The patient has no other sites of bleeding.  Patient has no chest pain or shortness of breath.  Patient states bowel movements have been normal no hematochezia or melena.  Patient does note he is on Eliquis for atrial fibrillation.  Patient does state he is currently being treated with ciprofloxacin for an open wound on his heel.       Patient Care Team  Primary Care Provider: Shai Azar PA    Past Medical History:     Allergies   Allergen Reactions    Cucumber Extract Unknown - High Severity and Other (See Comments)    Influenza Virus Vaccine Unknown - High Severity    Sitagliptin Unknown - High Severity    [place] Unknown - High Severity    Tomato Unknown - High Severity and Other (See Comments)    Nicotine Rash     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     Past Surgical History:   Procedure Laterality Date    COLONOSCOPY      TONSILLECTOMY       Family History   Problem Relation Age of  Onset    Cancer Father        Home Medications:  Prior to Admission medications    Medication Sig Start Date End Date Taking? Authorizing Provider   albuterol sulfate  (90 Base) MCG/ACT inhaler Inhale 2 puffs Every 6 (Six) Hours As Needed. 12/13/22   Moiz Velasquez MD   allopurinol (ZYLOPRIM) 300 MG tablet Take 1 tablet by mouth Daily.    Moiz Velasquez MD   Cetirizine HCl (ZyrTEC Allergy) 10 MG capsule Take 10 mg by mouth Daily.    Moiz Velasquez MD   Cinnamon 500 MG capsule Take 1 capsule by mouth 2 (two) times a day.    Moiz Velasquez MD   digoxin (LANOXIN) 250 MCG tablet Take 1 tablet by mouth Daily. 12/9/22   Moiz Velasquez MD   dilTIAZem CD (CARDIZEM CD) 240 MG 24 hr capsule Take 1 capsule by mouth Daily.    Moiz Velasquez MD   Eliquis 5 MG tablet tablet Take 1 tablet by mouth 2 (Two) Times a Day.    Moiz Velasquez MD   fluticasone (FLONASE) 50 MCG/ACT nasal spray 1 spray into the nostril(s) as directed by provider Daily.    Moiz Velasquez MD   furosemide (LASIX) 40 MG tablet Take 1 tablet by mouth Daily. 7/10/23   Moiz Velasquez MD   gabapentin (NEURONTIN) 300 MG capsule Take 1 capsule by mouth 3 (Three) Times a Day. 11/29/22   Moiz Velasquez MD   gabapentin (NEURONTIN) 600 MG tablet Take 1 tablet by mouth 3 (Three) Times a Day.    Moiz Velasquez MD   glimepiride (AMARYL) 4 MG tablet Take 1 tablet by mouth 2 (Two) Times a Day. 12/9/22   Moiz Velasquez MD   HYDROcodone-acetaminophen (NORCO) 5-325 MG per tablet Take 1 tablet by mouth Every 12 (Twelve) Hours As Needed. 11/29/22   Moiz Velasquez MD   metFORMIN (GLUCOPHAGE) 500 MG tablet Take 2 tablets by mouth 2 (Two) Times a Day With Meals. Take 2 tablets twice a day    Moiz Velasquez MD   metoprolol succinate XL (TOPROL-XL) 100 MG 24 hr tablet Take 1 tablet by mouth Daily.    Moiz Velasquez MD   potassium chloride 10 MEQ CR tablet Take 1 tablet by  "mouth Daily. 10/16/22   ProviderMoiz MD   pyridostigmine (MESTINON) 60 MG tablet Take 0.5 tablets by mouth 3 (Three) Times a Day for 30 days. 10/30/23 11/29/23  Vinicio Sepulveda MD   Turmeric 400 MG capsule Take 400 mg by mouth Daily.    Provider, MD Moiz        Social History:   Social History     Tobacco Use    Smoking status: Never     Passive exposure: Never    Smokeless tobacco: Never   Vaping Use    Vaping status: Never Used   Substance Use Topics    Alcohol use: Not Currently    Drug use: Never         Review of Systems:  Review of Systems   Constitutional:  Negative for chills, diaphoresis and fever.   HENT:  Negative for congestion, postnasal drip, rhinorrhea and sore throat.    Eyes:  Negative for photophobia.   Respiratory:  Negative for cough, chest tightness and shortness of breath.    Cardiovascular:  Positive for leg swelling. Negative for chest pain and palpitations.   Gastrointestinal:  Negative for abdominal pain, diarrhea, nausea and vomiting.   Genitourinary:  Positive for hematuria. Negative for difficulty urinating, dysuria, flank pain, frequency and urgency.   Musculoskeletal:  Negative for neck pain and neck stiffness.   Skin:  Negative for pallor and rash.   Neurological:  Negative for dizziness, syncope, weakness, numbness and headaches.   Hematological:  Negative for adenopathy. Does not bruise/bleed easily.   Psychiatric/Behavioral: Negative.          Physical Exam:  /68 (BP Location: Left arm, Patient Position: Sitting)   Pulse 96   Temp 98.4 °F (36.9 °C) (Oral)   Resp 18   Ht 195.6 cm (77\")   Wt (!) 217 kg (477 lb 8.3 oz)   SpO2 94%   BMI 56.63 kg/m²         Physical Exam  Vitals and nursing note reviewed.   Constitutional:       General: He is not in acute distress.     Appearance: Normal appearance. He is not ill-appearing, toxic-appearing or diaphoretic.   HENT:      Head: Normocephalic and atraumatic.      Mouth/Throat:      Mouth: Mucous membranes " are moist.   Eyes:      Extraocular Movements: Extraocular movements intact.      Conjunctiva/sclera: Conjunctivae normal.      Pupils: Pupils are equal, round, and reactive to light.   Cardiovascular:      Rate and Rhythm: Normal rate. Rhythm irregular.      Pulses: Normal pulses.           Carotid pulses are 2+ on the right side and 2+ on the left side.       Radial pulses are 2+ on the right side and 2+ on the left side.        Femoral pulses are 2+ on the right side and 2+ on the left side.       Popliteal pulses are 2+ on the right side and 2+ on the left side.        Dorsalis pedis pulses are 2+ on the right side and 2+ on the left side.        Posterior tibial pulses are 2+ on the right side and 2+ on the left side.      Heart sounds: Normal heart sounds. No murmur heard.  Pulmonary:      Effort: Pulmonary effort is normal. No accessory muscle usage, respiratory distress or retractions.      Breath sounds: Normal breath sounds. No wheezing, rhonchi or rales.   Abdominal:      General: Abdomen is flat. There is no distension.      Palpations: Abdomen is soft. There is no mass or pulsatile mass.      Tenderness: There is no abdominal tenderness. There is no right CVA tenderness, left CVA tenderness, guarding or rebound.      Comments: No rigidity   Musculoskeletal:         General: No swelling, tenderness or deformity.      Cervical back: Neck supple. No tenderness.      Right lower leg: Edema present.      Left lower leg: Edema present.      Comments: The patient has chronic severe lymphedema   Skin:     General: Skin is warm and dry.      Capillary Refill: Capillary refill takes less than 2 seconds.      Coloration: Skin is not cyanotic, jaundiced or pale.      Findings: Erythema and rash present.      Comments: Patient has changes in the legs consistent with venous stasis dermatitis which include a malar erythematous dark rash of the legs bilaterally   Neurological:      General: No focal deficit present.       Mental Status: He is alert and oriented to person, place, and time. Mental status is at baseline.      Cranial Nerves: Cranial nerves 2-12 are intact. No cranial nerve deficit.      Sensory: Sensation is intact. No sensory deficit.      Motor: Motor function is intact. No weakness or pronator drift.      Coordination: Coordination is intact. Coordination normal.   Psychiatric:         Attention and Perception: Attention and perception normal.         Mood and Affect: Mood normal.         Behavior: Behavior normal.                  Procedures:  Procedures      Medical Decision Making:      Comorbidities that affect care:    Diabetes, cellulitis, atrial fibrillation, congestive heart failure, morbid obesity, coronary artery disease, chronic lymphedema    External Notes reviewed:    None      The following orders were placed and all results were independently analyzed by me:  Orders Placed This Encounter   Procedures    Blood Culture - Blood,    Blood Culture - Blood,    CT Abdomen Pelvis With Contrast    [place] Draw    Comprehensive Metabolic Panel    Lipase    Urinalysis With Microscopic If Indicated (No Culture) - Urine, Clean Catch    Lactic Acid, Plasma    CBC Auto Differential    STAT Lactic Acid, Reflex    Urinalysis, Microscopic Only - Urine, Clean Catch    NPO Diet NPO Type: Strict NPO    Undress & Gown    Insert Peripheral IV    CBC & Differential    Green Top (Gel)    Lavender Top    Gold Top - SST    Light Blue Top       Medications Given in the Emergency Department:  Medications   sodium chloride 0.9 % flush 10 mL (has no administration in time range)   iopamidol (ISOVUE-370) 76 % injection 100 mL (100 mL Intravenous Given 5/8/24 1909)        ED Course:    The patient was initially evaluated in the triage area where orders were placed. The patient was later dispositioned by Jc Escamilla DO.      The patient was advised to stay for completion of workup which includes but is not limited to communication  of labs and radiological results, reassessment and plan. The patient was advised that leaving prior to disposition by a provider could result in critical findings that are not communicated to the patient.     ED Course as of 05/08/24 2102   Wed May 08, 2024   1647 PROVIDER IN TRIAGE  Patient was evaluated by me in triage, Chu Patterson PA-C.  Orders were placed and patient is currently awaiting final results and disposition.  [MD]      ED Course User Index  [MD] Chu Patterson PA-C       Labs:    Lab Results (last 24 hours)       Procedure Component Value Units Date/Time    CBC & Differential [[id number]]  (Abnormal) Collected: 05/08/24 1655    Specimen: Blood from Hand, Left Updated: 05/08/24 1704    Narrative:      The following orders were created for panel order CBC & Differential.  Procedure                               Abnormality         Status                     ---------                               -----------         ------                     CBC Auto Differential[[id number]]        Abnormal            Final result                 Please view results for these tests on the individual orders.    Comprehensive Metabolic Panel [[id number]]  (Abnormal) Collected: 05/08/24 1655    Specimen: Blood from Hand, Left Updated: 05/08/24 1723     Glucose 233 mg/dL      BUN 11 mg/dL      Creatinine 0.86 mg/dL      Sodium 138 mmol/L      Potassium 4.6 mmol/L      Chloride 98 mmol/L      CO2 25.8 mmol/L      Calcium 9.5 mg/dL      Total Protein 7.2 g/dL      Albumin 3.9 g/dL      ALT (SGPT) 14 U/L      AST (SGOT) 13 U/L      Alkaline Phosphatase 61 U/L      Total Bilirubin 0.4 mg/dL      Globulin 3.3 gm/dL      A/G Ratio 1.2 g/dL      BUN/Creatinine Ratio 12.8     Anion Gap 14.2 mmol/L      eGFR 96.7 mL/min/1.73     Narrative:      GFR Normal >60  Chronic Kidney Disease <60  Kidney Failure <15      Lipase [[id number]]  (Normal) Collected: 05/08/24 1655    Specimen: Blood from Hand, Left Updated: 05/08/24 1723      Lipase 14 U/L     Lactic Acid, Plasma [[id number]]  (Abnormal) Collected: 05/08/24 1655    Specimen: Blood from Hand, Left Updated: 05/08/24 1736     Lactate 4.7 mmol/L     CBC Auto Differential [[id number]]  (Abnormal) Collected: 05/08/24 1655    Specimen: Blood from Hand, Left Updated: 05/08/24 1704     WBC 10.69 10*3/mm3      RBC 4.47 10*6/mm3      Hemoglobin 13.5 g/dL      Hematocrit 41.6 %      MCV 93.1 fL      MCH 30.2 pg      MCHC 32.5 g/dL      RDW 15.1 %      RDW-SD 51.0 fl      MPV 10.2 fL      Platelets 315 10*3/mm3      Neutrophil % 79.3 %      Lymphocyte % 11.1 %      Monocyte % 6.7 %      Eosinophil % 2.0 %      Basophil % 0.4 %      Immature Grans % 0.5 %      Neutrophils, Absolute 8.48 10*3/mm3      Lymphocytes, Absolute 1.19 10*3/mm3      Monocytes, Absolute 0.72 10*3/mm3      Eosinophils, Absolute 0.21 10*3/mm3      Basophils, Absolute 0.04 10*3/mm3      Immature Grans, Absolute 0.05 10*3/mm3      nRBC 0.0 /100 WBC     Blood Culture - Blood, Hand, Left [[id number]] Collected: 05/08/24 1655    Specimen: Blood from Hand, Left Updated: 05/08/24 1659    Urinalysis With Microscopic If Indicated (No Culture) - Urine, Clean Catch [[id number]]  (Abnormal) Collected: 05/08/24 1806    Specimen: Urine, Clean Catch Updated: 05/08/24 1829     Color, UA Yellow     Appearance, UA Clear     pH, UA 5.5     Specific Gravity, UA 1.021     Glucose,  mg/dL (1+)     Ketones, UA Trace     Bilirubin, UA Negative     Blood, UA Moderate (2+)     Protein, UA Trace     Leuk Esterase, UA Negative     Nitrite, UA Negative     Urobilinogen, UA 1.0 E.U./dL    Urinalysis, Microscopic Only - Urine, Clean Catch [[id number]]  (Abnormal) Collected: 05/08/24 1806    Specimen: Urine, Clean Catch Updated: 05/08/24 1830     RBC, UA Too Numerous to Count /HPF      WBC, UA 0-2 /HPF      Bacteria, UA None Seen /HPF      Squamous Epithelial Cells, UA 0-2 /HPF      Hyaline Casts, UA 0-2 /LPF      Methodology Automated Microscopy    Blood  Culture - Blood, Arm, Right [[id number]] Collected: 05/08/24 1827    Specimen: Blood from Arm, Right Updated: 05/08/24 1830    STAT Lactic Acid, Reflex [[id number]] Collected: 05/08/24 2041    Specimen: Blood Updated: 05/08/24 2043             Imaging:    CT Abdomen Pelvis With Contrast    Result Date: 5/8/2024  CT ABDOMEN PELVIS W CONTRAST-  Date of Exam: 5/8/2024 7:07 PM  Indication: Gross hematuria painless.  Comparison: CT pelvis without contrast dated 9/12/2022, CT abdomen pelvis with contrast dated 4/25/2019  Technique: Axial CT images were obtained of the abdomen and pelvis following the uneventful intravenous administration of iodinated contrast. Reconstructed coronal and sagittal images were also obtained. Automated exposure control and iterative construction methods were used.   Findings: The lung bases are clear bilaterally.  Increased density in the gallbladder is consistent with stones and/or sludge. The liver, spleen, pancreas and adrenal glands appear unremarkable. There is some infiltration of the retroperitoneum along the posterior aspect of the pancreas. There are several mildly prominent triad lymph nodes measuring up to 0.9 cm in short axis. This appears similar to mildly improved compared to the 4/25/2019 study. There are 2 nonobstructing left renal stones measuring up to 0.8 cm. 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 In its anterior aspect is a 1.4 cm hypodense focus which is chronic. The kidneys otherwise appears unremarkable. In the mid to inferior right kidney is a 0.5 cm hypodense focus, too small to further characterize. It is not distinctly seen on the previous exam.  The patient has a large pannus. There is marked skin thickening and infiltration of the fat within the pannus suggesting cellulitis. No acute bowel abnormality is identified. The lack of oral contrast limits evaluation of the bowel. There is a fat-containing umbilical hernia which measures 3.6 cm transverse.  The urinary bladder  extends into the pannus but otherwise appears grossly unremarkable. No overt abnormality of the ureters is identified.  No focal osseous lesion is seen.        Impression: 1.  Biliary studies plus or minus gallstones within the gallbladder. 2.  Nonobstructing left renal stones 3.  Small hypodense foci in the right kidney, statistically most likely cysts but too small for definitive characterization with CT. 4.  Large pannus with evidence of marked cellulitis. A similar finding has been noted on previous examinations.    Electronically Signed By-Jaquan Herrera MD On:5/8/2024 8:10 PM         Differential Diagnosis and Discussion:      Hematuria: Differential diagnosis includes but is not limited to medications, coagulopathy, glomerulonephritis, nephritis, neoplasm, vascular abnormalities, cystitis, urethritis, neoplasms of the bladder, and autoimmune disorders.    All labs were reviewed and interpreted by me.  CT scan radiology impression was interpreted by me.    MDM  Number of Diagnoses or Management Options  Hematuria, unspecified type  Kidney stone  Diagnosis management comments: Sepsis was not present in the emergency department or on arrival. This is supported as the patient does not either meet two out of the four SIRS criteria or has an obvious bacterial infection.      SIRS criteria considered:   1.                     Temperature > 100.4 or <98.6    2.                     Heart Rate > 90    3.                     Respiratory Rate > 22    4.                     WBC > 12K or <4K.    Patient vital signs were stable in the emergency room.    Urinalysis demonstrated too numerous red blood cells.  There is no white blood cells or bacteria seen.  There is negative nitrites    The patient's CMP was reviewed and shows no abnormalities of critical concern.  Of note, the patient's sodium and potassium are acceptable.  The patient's liver enzymes are unremarkable.  The patient's renal function including creatinine is  preserved.  The patient has a normal anion gap.    The patient's CBC was reviewed and shows no abnormalities of critical concern.  Of note, there is no anemia requiring a blood transfusion and the platelet count is acceptable    Patient's lactate was elevated at 4.7.  I feel this is elevated from the patient's metformin which can cause a lactic acidosis.  The patient was afebrile.  The patient had normal white blood cell count.      CT scan demonstrates nonobstructing stones in the left kidney.  CT scan shows hypodense lesions in the right kidney.    I have discussed the CT findings with the patient.  I discussed the need for further evaluation with urologist.  The patient will review the CT findings with the urologist discussed possible need for further evaluation of the hypodense lesions in the kidney.  He also discussed the possible need for cystoscopy.    The patient was given very specific instructions on when and why to return to the emergency room.  The patient voiced understanding and felt comfortable with the discharge instructions.  They would return to the emergency room if necessary.  The patient appears appropriate for discharge and outpatient follow-up.           Amount and/or Complexity of Data Reviewed  Clinical lab tests: reviewed  Tests in the radiology section of CPT®: reviewed         Social Determinants of Health:    Patient is independent, reliable, and has access to care.       Disposition and Care Coordination:    Discharged: The patient is suitable and stable for discharge with no need for consideration of admission.    I have explained discharge medications and the need for follow up with the patient/caretakers. This was also printed in the discharge instructions. Patient was discharged with the following medications and follow up:      Medication List      No changes were made to your prescriptions during this visit.      Shai Azar PA  [address]  [id number]  [phone]    Call on 5/9/2024      Adrian Watts MD  [address]  [place] [address]  [phone]    Call on 5/10/2024  Hematuria       Final diagnoses:   Hematuria, unspecified type   Kidney stone        ED Disposition       ED Disposition   Discharge    Condition   Stable    Comment   --               This medical record created using voice recognition software.             Jc Escamilla DO  05/08/24 1062

## 2024-05-09 LAB — BACTERIA SPEC AEROBE CULT: NORMAL

## 2024-05-09 NOTE — DISCHARGE INSTRUCTIONS
Please return to emergency room for flank pain, abdominal pain, urinary retention, vomiting, fever, near passing out, passing out, shortness of breath or any new symptoms you are concerned well    Please review the CT scan that was performed today with the urologist.  Please discuss the possible need further evaluation of the hypodense lesions found in the right kidney.  Please also discuss the need for cystoscopy

## 2024-05-13 LAB
BACTERIA SPEC AEROBE CULT: NORMAL
BACTERIA SPEC AEROBE CULT: NORMAL

## 2024-06-26 PROBLEM — R31.29 MICROHEMATURIA: Status: ACTIVE | Noted: 2024-06-26

## 2024-06-26 PROBLEM — N20.0 NEPHROLITHIASIS: Status: ACTIVE | Noted: 2024-06-26

## 2024-06-26 PROBLEM — N28.9 RENAL LESION: Status: ACTIVE | Noted: 2024-06-26

## 2024-06-26 PROBLEM — R31.0 GROSS HEMATURIA: Status: ACTIVE | Noted: 2024-06-26

## 2024-06-26 NOTE — PROGRESS NOTES
Chief Complaint: Urologic complaint    Subjective         History of Present Illness  Niall Mak is a 64 y.o. male       Renal lesion  Gross hematuria  Nephrolithiasis  Morbid obesity      On Cipro last month for open wound on heel    5/8/2024 ED visit for gross hematuria.  No clots.  1 episode    5/8/2024 CT abdomen/pelvis with - small hypodense focus in the right kidney most likely cyst, too small to definitively characterize.  Large pannus with evidence of marked cellulitis.  2 mm and 5 mm stones in the left kidney, nonobstructing.  No stones on the right.  Delayed phase goes almost to pelvis images reviewed    5/24 UA - TNTC RBCs, no bacteria  5/24 0.8, GFR 96    11/23 urine culture Citrobacter      On Eliquis for A-fib      No GH    No history of kidney  stone.    No urologic family history  No history of urologic surgery.    No CAD  Non-smoker        Objective               Vital Signs:   There were no vitals taken for this visit.     Physical exam    Alert and orient x3  Well appearing, well developed, in no acute distress   Unlabored respirations  Nontender/nondistended    Patient with large pannus with buried penis.      Grossly oriented to person, place and time, judgment is intact, normal mood and affect              Assessment and Plan    Diagnoses and all orders for this visit:    1. Nephrolithiasis (Primary)    2. Renal lesion    3. Gross hematuria      CT imaging reviewed discussed with patient    Records reviewed and summarized in chart        Renal cyst      Patient with some indeterminate lesions in his kidneys.  After discussion we will repeat a CT abdomen with without contrast in 18 months        Nephrolithiasis    The patient was counseled on the preventative measures of kidney stones today.  This included increasing fluid intake to make at least 1.5 ml daily, decreasing meat intake, decreasing salt intake and taking in a normal amount of calcium (1000 mg daily).  Information handout given  on this today.      We also discussed the DASH diet today for stone prevention and handout was given        Gross hematuria      Patient with gross hematuria x 1.  We discussed workup, because of his body habitus we could not do this in the office.  He did discuss cystoscopy with bilateral retrograde pyelograms.  Risks and benefits were discussed including bleeding, infection and damage to the urinary system.  We also discussed the risk of anesthesia up to and including death.  Patient voiced understanding       We did discuss was about a 5% chance or could be a underlying malignancy which could be detrimental to his health or cause death.  Patient voiced understanding after discussion risk benefits he is not interested in further workup, he will let me know if he changes his mind.

## 2024-06-28 ENCOUNTER — OFFICE VISIT (OUTPATIENT)
Dept: UROLOGY | Facility: CLINIC | Age: 64
End: 2024-06-28
Payer: MEDICARE

## 2024-06-28 VITALS — RESPIRATION RATE: 19 BRPM

## 2024-06-28 DIAGNOSIS — R31.0 GROSS HEMATURIA: ICD-10-CM

## 2024-06-28 DIAGNOSIS — N28.9 RENAL LESION: ICD-10-CM

## 2024-06-28 DIAGNOSIS — N20.0 NEPHROLITHIASIS: Primary | ICD-10-CM

## 2024-07-29 ENCOUNTER — OFFICE VISIT (OUTPATIENT)
Dept: PODIATRY | Facility: CLINIC | Age: 64
End: 2024-07-29
Payer: MEDICARE

## 2024-07-29 VITALS
DIASTOLIC BLOOD PRESSURE: 66 MMHG | OXYGEN SATURATION: 90 % | WEIGHT: 315 LBS | BODY MASS INDEX: 56.45 KG/M2 | HEART RATE: 86 BPM | SYSTOLIC BLOOD PRESSURE: 148 MMHG | TEMPERATURE: 97.4 F

## 2024-07-29 DIAGNOSIS — B35.1 ONYCHOMYCOSIS: ICD-10-CM

## 2024-07-29 DIAGNOSIS — L60.0 ONYCHOCRYPTOSIS: ICD-10-CM

## 2024-07-29 DIAGNOSIS — M79.672 FOOT PAIN, BILATERAL: ICD-10-CM

## 2024-07-29 DIAGNOSIS — R26.2 DIFFICULTY WALKING: Primary | ICD-10-CM

## 2024-07-29 DIAGNOSIS — E11.9 DIABETES MELLITUS WITHOUT COMPLICATION: ICD-10-CM

## 2024-07-29 DIAGNOSIS — M79.671 FOOT PAIN, BILATERAL: ICD-10-CM

## 2024-07-29 DIAGNOSIS — E11.8 DM FEET: ICD-10-CM

## 2024-07-29 PROCEDURE — 1160F RVW MEDS BY RX/DR IN RCRD: CPT | Performed by: PODIATRIST

## 2024-07-29 PROCEDURE — 11721 DEBRIDE NAIL 6 OR MORE: CPT | Performed by: PODIATRIST

## 2024-07-29 PROCEDURE — 1159F MED LIST DOCD IN RCRD: CPT | Performed by: PODIATRIST

## 2024-07-29 NOTE — PROGRESS NOTES
[facility] - PODIATRY    Today's Date: 07/29/24    Patient Name: Niall Mak  MRN: [id number]  CSN: [id number]  PCP: Shai Azar PA, Last PCP Visit: 7 May 2024  Referring Provider: No ref. provider found    SUBJECTIVE     HPI: Niall Mak, a 64 y.o.male, presents to clinic for painful toenail:    New, Established, New Problem:  Established  Location:  Toenails  Duration:   Greater than five years  Onset:  Gradual  Nature:  sore with palpation.  Stable, worsening, improving:   Recurring  Aggravating factors:  Pain with shoe gear and ambulation.  Previous Treatment:  Debridement    Patient controlling diabetes via: Oral medication    Patient states their most recent blood glucose reading was  194.    Patient denies any fevers, chills, nausea, vomiting, shortness of breath, nor any other constitutional signs nor symptoms.    Medical changes: P.o. antibiotics for abdominal flatus..    I have reviewed/confirmed previously documented HPI with no changes.     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     Past Surgical History:   Procedure Laterality Date    COLONOSCOPY      TONSILLECTOMY       Family History   Problem Relation Age of Onset    Cancer Father      Social History     Socioeconomic History    Marital status:    Tobacco Use    Smoking status: Never     Passive exposure: Never    Smokeless tobacco: Never   Vaping Use    Vaping status: Never Used   Substance and Sexual Activity    Alcohol use: Not Currently    Drug use: Never    Sexual activity: Not Currently     Partners: Female     Allergies   Allergen Reactions    Cucumber Extract Unknown - High Severity and Other (See Comments)    Influenza Virus Vaccine Unknown - High Severity    Sitagliptin  Unknown - High Severity    [place] Unknown - High Severity    Tomato Unknown - High Severity and Other (See Comments)    Nicotine Rash     Current Outpatient Medications   Medication Sig Dispense Refill    albuterol sulfate  (90 Base) MCG/ACT inhaler Inhale 2 puffs Every 6 (Six) Hours As Needed.      allopurinol (ZYLOPRIM) 300 MG tablet Take 1 tablet by mouth Daily.      Cetirizine HCl (ZyrTEC Allergy) 10 MG capsule Take 10 mg by mouth Daily.      Cinnamon 500 MG capsule Take 1 capsule by mouth 2 (two) times a day.      digoxin (LANOXIN) 250 MCG tablet Take 1 tablet by mouth Daily.      dilTIAZem CD (CARDIZEM CD) 240 MG 24 hr capsule Take 1 capsule by mouth Daily.      Eliquis 5 MG tablet tablet Take 1 tablet by mouth 2 (Two) Times a Day.      fluticasone (FLONASE) 50 MCG/ACT nasal spray 1 spray into the nostril(s) as directed by provider Daily.      furosemide (LASIX) 40 MG tablet Take 1 tablet by mouth Daily.      gabapentin (NEURONTIN) 300 MG capsule Take 1 capsule by mouth 3 (Three) Times a Day.      gabapentin (NEURONTIN) 600 MG tablet Take 1 tablet by mouth 3 (Three) Times a Day.      glimepiride (AMARYL) 4 MG tablet Take 1 tablet by mouth 2 (Two) Times a Day.      HYDROcodone-acetaminophen (NORCO) 5-325 MG per tablet Take 1 tablet by mouth Every 12 (Twelve) Hours As Needed.      metFORMIN (GLUCOPHAGE) 500 MG tablet Take 2 tablets by mouth 2 (Two) Times a Day With Meals. Take 2 tablets twice a day      metoprolol succinate XL (TOPROL-XL) 100 MG 24 hr tablet Take 1 tablet by mouth Daily.      potassium chloride 10 MEQ CR tablet Take 1 tablet by mouth Daily.      Turmeric 400 MG capsule Take 400 mg by mouth Daily.      ciprofloxacin (CIPRO) 500 MG tablet Take 1 tablet by mouth 2 (Two) Times a Day. (Patient not taking: Reported on 6/28/2024)      pyridostigmine (MESTINON) 60 MG tablet Take 0.5 tablets by mouth 3 (Three) Times a Day for 30 days. 45 tablet 0     No current facility-administered  medications for this visit.     Review of Systems   Constitutional: Negative.    Skin:         Painful toenails   Neurological:  Positive for numbness.   All other systems reviewed and are negative.      OBJECTIVE     Vitals:    07/29/24 1450   BP: 148/66   Pulse: 86   Temp: 97.4 °F (36.3 °C)   SpO2: 90%           Body mass index is 56.45 kg/m².    Lab Results   Component Value Date    HGBA1C 7.8 (H) 03/01/2024       Lab Results   Component Value Date    GLUCOSE 233 (H) 05/08/2024    CALCIUM 9.5 05/08/2024     05/08/2024    K 4.6 05/08/2024    CO2 25.8 05/08/2024    CL 98 05/08/2024    BUN 11 05/08/2024    CREATININE 0.86 05/08/2024    EGFRIFAFRI >60 06/13/2022    EGFRIFNONA 94 08/01/2021    BCR 12.8 05/08/2024    ANIONGAP 14.2 05/08/2024       Patient seen in no apparent distress.      PHYSICAL EXAM:     Foot/Ankle Exam    GENERAL  Appearance:  chronically ill (Morbidly obese obesity)  Orientation:  AAOx3  Affect:  appropriate  Gait:  antalgic  Assistance:  walker  Right shoe gear: diabetic shoe  Left shoe gear: diabetic shoe    VASCULAR     Right Foot Vascularity   Dorsalis pedis:  Absent (Palpable due to lower extremity edema)  Posterior tibial:  Absent  Skin temperature:  warm  Edema grading:  Pitting and 4+  CFT:  < 3 seconds  Pedal hair growth:  Absent  Varicosities:  severe varicosities     Left Foot Vascularity   Dorsalis pedis:  Absent (Nonpalpable due to lower extremity edema)  Posterior tibial:  Absent  Skin temperature:  warm  Edema grading:  Pitting and 4+  CFT:  < 3 seconds  Pedal hair growth:  Absent  Varicosities:  severe varicosities     NEUROLOGIC     Right Foot Neurologic   Light touch sensation: diminished  Vibratory sensation: diminished  Hot/Cold sensation: diminished  Protective Sensation using [place] Monofilament:   Sites intact: 3  Sites tested: 10     Left Foot Neurologic   Light touch sensation: diminished  Vibratory sensation: diminished  Hot/Cold sensation:   diminished  Protective Sensation using [place]-Marie Monofilament:   Sites intact: 2  Sites tested: 10    MUSCLE STRENGTH     Right Foot Muscle Strength   Foot dorsiflexion:  4-  Foot plantar flexion:  4-  Foot inversion:  4-  Foot eversion:  4-     Left Foot Muscle Strength   Foot dorsiflexion:  4-  Foot plantar flexion:  4-  Foot inversion:  4-  Foot eversion:  4-    RANGE OF MOTION     Right Foot Range of Motion   Foot and ankle ROM within normal limits       Left Foot Range of Motion   Foot and ankle ROM within normal limits      DERMATOLOGIC      Right Foot Dermatologic   Skin  Right foot skin is intact.   Nails  1.  Positive for elongated, onychomycosis, abnormal thickness, subungual debris and ingrown toenail.  2.  Positive for elongated, onychomycosis, abnormal thickness, subungual debris and ingrown toenail.  3.  Positive for elongated, onychomycosis, abnormal thickness, subungual debris and ingrown toenail.  4.  Positive for elongated, onychomycosis, abnormal thickness, subungual debris and ingrown toenail.  5.  Positive for elongated, onychomycosis, abnormal thickness, subungual debris and ingrown toenail.     Left Foot Dermatologic   Skin  Left foot skin is intact.   Nails  2.  Positive for elongated, onychomycosis, abnormal thickness, subungual debris and ingrown toenail.  3.  Positive for elongated, onychomycosis, abnormal thickness, subungual debris and ingrown toenail.  4.  Positive for elongated, onychomycosis, abnormally thick, subungual debris and ingrown toenail.  5.  Positive for elongated, onychomycosis, abnormally thick, subungual debris and ingrown toenail.    I have reexamined the patient the results are consistent with the previously documented exam.    ASSESSMENT/PLAN     Diagnoses and all orders for this visit:    1. Difficulty walking (Primary)    2. Onychocryptosis    3. Onychomycosis    4. DM feet    5. Diabetes mellitus without complication    6. Foot pain,  bilateral    Comprehensive lower extremity examination and evaluation was performed.    Discussed findings and treatment plan including risks, benefits, and treatment options with patient in detail. Patient agreed with treatment plan.    Medications and allergies reviewed.  Reviewed available blood glucose and HgB A1C lab values along with other pertinent labs.  These were discussed with the patient as to their importance of diabetic maintenance.    Toenails 1, 2, 3, 4, 5 on Right and 2, 3, 4, 5 on Left were debrided with nail nippers then filed with a Dremel nail silvia.  Patient tolerated procedure well without complications.    An After Visit Summary was printed and given to the patient at discharge, including (if requested) any available informative/educational handouts regarding diagnosis, treatment, or medications. All questions were answered to patient/family satisfaction. Should symptoms fail to improve or worsen they agree to call or return to clinic or to go to the Emergency Department. Discussed the importance of following up with any needed screening tests/labs/specialist appointments and any requested follow-up recommended by me today. Importance of maintaining follow-up discussed and patient accepts that missed appointments can delay diagnosis and potentially lead to worsening of conditions.    Return in about 9 weeks (around 9/30/2024) for Toenail Care., or sooner if acute issues arise.    I have reviewed the assessment and plan and verified the accuracy of it. No changes to assessment and plan since the information was documented. Des Ahumada DPM 07/29/24     I have dictated this note utilizing Dragon Dictation.  Please note that portions of this note were completed with a voice recognition program.  Part of this note may be an electronic transcription/translation of spoken language to printed text using the Dragon Dictation System.      This document has been electronically signed by Des  OSCAR Ahumada DPM on July 29, 2024 15:18 EDT

## 2024-10-24 ENCOUNTER — OFFICE VISIT (OUTPATIENT)
Dept: PODIATRY | Facility: CLINIC | Age: 64
End: 2024-10-24
Payer: MEDICARE

## 2024-10-24 VITALS
BODY MASS INDEX: 56.45 KG/M2 | SYSTOLIC BLOOD PRESSURE: 143 MMHG | OXYGEN SATURATION: 90 % | HEART RATE: 48 BPM | WEIGHT: 315 LBS | DIASTOLIC BLOOD PRESSURE: 81 MMHG | TEMPERATURE: 98 F

## 2024-10-24 DIAGNOSIS — B35.1 ONYCHOMYCOSIS: ICD-10-CM

## 2024-10-24 DIAGNOSIS — M79.672 FOOT PAIN, BILATERAL: ICD-10-CM

## 2024-10-24 DIAGNOSIS — R26.2 DIFFICULTY WALKING: ICD-10-CM

## 2024-10-24 DIAGNOSIS — M79.671 FOOT PAIN, BILATERAL: ICD-10-CM

## 2024-10-24 DIAGNOSIS — L60.0 ONYCHOCRYPTOSIS: ICD-10-CM

## 2024-10-24 DIAGNOSIS — E11.8 DM FEET: Primary | ICD-10-CM

## 2024-10-24 DIAGNOSIS — E11.9 DIABETES MELLITUS WITHOUT COMPLICATION: ICD-10-CM

## 2024-10-24 NOTE — PROGRESS NOTES
[facility] - PODIATRY    Today's Date: 10/24/24    Patient Name: Niall Mak  MRN: [id number]  CSN: [id number]  PCP: Shai Azar PA, Last PCP Visit: 8/30/2024  Referring Provider: No ref. provider found    SUBJECTIVE     HPI: Niall Mak, a 64 y.o.male, presents to clinic for painful toenail:    New, Established, New Problem:  Established  Location:  Toenails  Duration:   Greater than five years  Onset:  Gradual  Nature:  sore with palpation.  Stable, worsening, improving:   stable  Aggravating factors:  Pain with shoe gear and ambulation.  Previous Treatment:  Debridement    Patient controlling diabetes via: Oral medication    Patient states their most recent blood glucose reading was 250.    Patient denies any fevers, chills, nausea, vomiting, shortness of breath, nor any other constitutional signs nor symptoms.    Medical changes:  being followed by [facility] for a wound on his abdomen from cellulitis.    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     Past Surgical History:   Procedure Laterality Date    COLONOSCOPY      TONSILLECTOMY       Family History   Problem Relation Age of Onset    Cancer Father      Social History     Socioeconomic History    Marital status:    Tobacco Use    Smoking status: Never     Passive exposure: Never    Smokeless tobacco: Never   Vaping Use    Vaping status: Never Used   Substance and Sexual Activity    Alcohol use: Not Currently    Drug use: Never    Sexual activity: Not Currently     Partners: Female     Allergies   Allergen Reactions    Cucumber Extract Unknown - High Severity and Other (See Comments)    Influenza Virus Vaccine Unknown - High Severity    Sitagliptin Unknown - High Severity    [place]  Unknown - High Severity    Tomato Unknown - High Severity and Other (See Comments)    Nicotine Rash     Current Outpatient Medications   Medication Sig Dispense Refill    albuterol sulfate  (90 Base) MCG/ACT inhaler Inhale 2 puffs Every 6 (Six) Hours As Needed.      allopurinol (ZYLOPRIM) 300 MG tablet Take 1 tablet by mouth Daily.      Cetirizine HCl (ZyrTEC Allergy) 10 MG capsule Take 10 mg by mouth Daily.      Cinnamon 500 MG capsule Take 1 capsule by mouth 2 (two) times a day.      digoxin (LANOXIN) 250 MCG tablet Take 1 tablet by mouth Daily.      dilTIAZem CD (CARDIZEM CD) 240 MG 24 hr capsule Take 1 capsule by mouth Daily.      Eliquis 5 MG tablet tablet Take 1 tablet by mouth 2 (Two) Times a Day.      fluticasone (FLONASE) 50 MCG/ACT nasal spray Administer 1 spray into the nostril(s) as directed by provider Daily.      furosemide (LASIX) 40 MG tablet Take 1 tablet by mouth Daily.      gabapentin (NEURONTIN) 300 MG capsule Take 1 capsule by mouth 3 (Three) Times a Day.      gabapentin (NEURONTIN) 600 MG tablet Take 1 tablet by mouth 3 (Three) Times a Day.      glimepiride (AMARYL) 4 MG tablet Take 1 tablet by mouth 2 (Two) Times a Day.      HYDROcodone-acetaminophen (NORCO) 5-325 MG per tablet Take 1 tablet by mouth Every 12 (Twelve) Hours As Needed.      metFORMIN (GLUCOPHAGE) 500 MG tablet Take 2 tablets by mouth 2 (Two) Times a Day With Meals. Take 2 tablets twice a day      metoprolol succinate XL (TOPROL-XL) 100 MG 24 hr tablet Take 1 tablet by mouth Daily.      potassium chloride 10 MEQ CR tablet Take 1 tablet by mouth Daily.      Turmeric 400 MG capsule Take 400 mg by mouth Daily.      ciprofloxacin (CIPRO) 500 MG tablet Take 1 tablet by mouth 2 (Two) Times a Day. (Patient not taking: Reported on 10/24/2024)      pyridostigmine (MESTINON) 60 MG tablet Take 0.5 tablets by mouth 3 (Three) Times a Day for 30 days. 45 tablet 0     No current facility-administered medications for this visit.      Review of Systems   Constitutional: Negative.    Skin:         Painful toenails   Neurological:  Positive for numbness.   All other systems reviewed and are negative.    OBJECTIVE     Vitals:    10/24/24 1535   BP: 143/81   Pulse: (!) 48   Temp: 98 °F (36.7 °C)   SpO2: 90%     Body mass index is 56.45 kg/m².    Lab Results   Component Value Date    HGBA1C 7.8 (H) 03/01/2024       Lab Results   Component Value Date    GLUCOSE 233 (H) 05/08/2024    CALCIUM 9.5 05/08/2024     05/08/2024    K 4.6 05/08/2024    CO2 25.8 05/08/2024    CL 98 05/08/2024    BUN 11 05/08/2024    CREATININE 0.86 05/08/2024    EGFRIFAFRI >60 06/13/2022    EGFRIFNONA 94 08/01/2021    BCR 12.8 05/08/2024    ANIONGAP 14.2 05/08/2024       Patient seen in no apparent distress.      PHYSICAL EXAM:     Foot/Ankle Exam    GENERAL  Appearance:  chronically ill (Morbidly obese)  Orientation:  AAOx3  Affect:  appropriate  Gait:  antalgic  Assistance:  walker  Right shoe gear: diabetic shoe  Left shoe gear: diabetic shoe    VASCULAR     Right Foot Vascularity   Dorsalis pedis:  Absent (Palpable due to lower extremity edema)  Posterior tibial:  Absent  Skin temperature:  warm  Edema grading:  Pitting and 4+  CFT:  < 3 seconds  Pedal hair growth:  Absent  Varicosities:  severe varicosities     Left Foot Vascularity   Dorsalis pedis:  Absent (Nonpalpable due to lower extremity edema)  Posterior tibial:  Absent  Skin temperature:  warm  Edema grading:  Pitting and 4+  CFT:  < 3 seconds  Pedal hair growth:  Absent  Varicosities:  severe varicosities     NEUROLOGIC     Right Foot Neurologic   Light touch sensation: diminished  Vibratory sensation: diminished  Hot/Cold sensation: diminished  Protective Sensation using [place] Monofilament:   Sites intact: 3  Sites tested: 10     Left Foot Neurologic   Light touch sensation: diminished  Vibratory sensation: diminished  Hot/Cold sensation:  diminished  Protective Sensation using [place]  Monofilament:   Sites intact: 2  Sites tested: 10    MUSCLE STRENGTH     Right Foot Muscle Strength   Foot dorsiflexion:  4-  Foot plantar flexion:  4-  Foot inversion:  4-  Foot eversion:  4-     Left Foot Muscle Strength   Foot dorsiflexion:  4-  Foot plantar flexion:  4-  Foot inversion:  4-  Foot eversion:  4-    RANGE OF MOTION     Right Foot Range of Motion   Foot and ankle ROM within normal limits       Left Foot Range of Motion   Foot and ankle ROM within normal limits      DERMATOLOGIC      Right Foot Dermatologic   Skin  Right foot skin is intact.   Nails  1.  Positive for elongated, onychomycosis, abnormal thickness, subungual debris and ingrown toenail.  2.  Positive for elongated, onychomycosis, abnormal thickness, subungual debris and ingrown toenail.  3.  Positive for elongated, onychomycosis, abnormal thickness, subungual debris and ingrown toenail.  4.  Positive for elongated, onychomycosis, abnormal thickness, subungual debris and ingrown toenail.  5.  Positive for elongated, onychomycosis, abnormal thickness, subungual debris and ingrown toenail.     Left Foot Dermatologic   Skin  Left foot skin is intact.   Nails  2.  Positive for elongated, onychomycosis, abnormal thickness, subungual debris and ingrown toenail.  3.  Positive for elongated, onychomycosis, abnormal thickness, subungual debris and ingrown toenail.  4.  Positive for elongated, onychomycosis, abnormally thick, subungual debris and ingrown toenail.  5.  Positive for elongated, onychomycosis, abnormally thick, subungual debris and ingrown toenail.    I have reexamined the patient the results are consistent with the previously documented exam.    ASSESSMENT/PLAN     Diagnoses and all orders for this visit:    1. DM feet (Primary)    2. Onychomycosis    3. Onychocryptosis    4. Difficulty walking    5. Diabetes mellitus without complication    6. Foot pain, bilateral    Comprehensive lower extremity examination and evaluation was  performed.    Discussed findings and treatment plan including risks, benefits, and treatment options with patient in detail. Patient agreed with treatment plan.    Medications and allergies reviewed.  Reviewed available blood glucose and HgB A1C lab values along with other pertinent labs.  These were discussed with the patient as to their importance of diabetic maintenance.    Toenails 1, 2, 3, 4, 5 on Right and 2, 3, 4, 5 on Left were debrided with nail nippers then filed with a Dremel nail silvia.  Patient tolerated procedure well without complications.    An After Visit Summary was printed and given to the patient at discharge, including (if requested) any available informative/educational handouts regarding diagnosis, treatment, or medications. All questions were answered to patient/family satisfaction. Should symptoms fail to improve or worsen they agree to call or return to clinic or to go to the Emergency Department. Discussed the importance of following up with any needed screening tests/labs/specialist appointments and any requested follow-up recommended by me today. Importance of maintaining follow-up discussed and patient accepts that missed appointments can delay diagnosis and potentially lead to worsening of conditions.    Return in about 9 weeks (around 12/26/2024) for Toenail Care., or sooner if acute issues arise.    I have reviewed the assessment and plan and verified the accuracy of it. No changes to assessment and plan since the information was documented. Des Ahumada DPM 10/24/24     I have dictated this note utilizing Dragon Dictation.  Please note that portions of this note were completed with a voice recognition program.  Part of this note may be an electronic transcription/translation of spoken language to printed text using the Dragon Dictation System.      This document has been electronically signed by Des Ahumada DPM on October 24, 2024 15:44 EDT

## 2024-12-31 ENCOUNTER — LAB REQUISITION (OUTPATIENT)
Dept: LAB | Facility: HOSPITAL | Age: 64
End: 2024-12-31
Payer: MEDICARE

## 2024-12-31 DIAGNOSIS — E11.40 TYPE 2 DIABETES MELLITUS WITH DIABETIC NEUROPATHY, UNSPECIFIED: ICD-10-CM

## 2024-12-31 DIAGNOSIS — A41.01 SEPSIS DUE TO METHICILLIN SUSCEPTIBLE STAPHYLOCOCCUS AUREUS: ICD-10-CM

## 2024-12-31 DIAGNOSIS — L97.411 NON-PRESSURE CHRONIC ULCER OF RIGHT HEEL AND MIDFOOT LIMITED TO BREAKDOWN OF SKIN: ICD-10-CM

## 2024-12-31 DIAGNOSIS — E11.621 TYPE 2 DIABETES MELLITUS WITH FOOT ULCER (CODE): ICD-10-CM

## 2024-12-31 PROCEDURE — 87205 SMEAR GRAM STAIN: CPT | Performed by: PHYSICIAN ASSISTANT

## 2024-12-31 PROCEDURE — 87070 CULTURE OTHR SPECIMN AEROBIC: CPT | Performed by: PHYSICIAN ASSISTANT

## 2025-01-03 LAB
BACTERIA SPEC AEROBE CULT: NORMAL
GRAM STN SPEC: NORMAL

## 2025-01-15 ENCOUNTER — LAB REQUISITION (OUTPATIENT)
Dept: LAB | Facility: HOSPITAL | Age: 65
End: 2025-01-15
Payer: MEDICARE

## 2025-01-15 DIAGNOSIS — I11.0 HYPERTENSIVE HEART DISEASE WITH HEART FAILURE: ICD-10-CM

## 2025-01-15 DIAGNOSIS — E66.01 MORBID (SEVERE) OBESITY DUE TO EXCESS CALORIES: ICD-10-CM

## 2025-01-15 DIAGNOSIS — L97.411 NON-PRESSURE CHRONIC ULCER OF RIGHT HEEL AND MIDFOOT LIMITED TO BREAKDOWN OF SKIN: ICD-10-CM

## 2025-01-15 DIAGNOSIS — Z79.84 LONG TERM (CURRENT) USE OF ORAL HYPOGLYCEMIC DRUGS: ICD-10-CM

## 2025-01-15 DIAGNOSIS — I50.32 CHRONIC DIASTOLIC (CONGESTIVE) HEART FAILURE: ICD-10-CM

## 2025-01-15 DIAGNOSIS — E11.621 TYPE 2 DIABETES MELLITUS WITH FOOT ULCER (CODE): ICD-10-CM

## 2025-01-15 DIAGNOSIS — Z79.01 LONG TERM (CURRENT) USE OF ANTICOAGULANTS: ICD-10-CM

## 2025-01-15 LAB
ALBUMIN SERPL-MCNC: 3.8 G/DL (ref 3.5–5.2)
ALBUMIN UR-MCNC: 3 MG/DL
ALBUMIN/GLOB SERPL: 1.4 G/DL
ALP SERPL-CCNC: 74 U/L (ref 39–117)
ALT SERPL W P-5'-P-CCNC: 12 U/L (ref 1–41)
ANION GAP SERPL CALCULATED.3IONS-SCNC: 10.6 MMOL/L (ref 5–15)
AST SERPL-CCNC: 12 U/L (ref 1–40)
BASOPHILS # BLD AUTO: 0.06 10*3/MM3 (ref 0–0.2)
BASOPHILS NFR BLD AUTO: 0.7 % (ref 0–1.5)
BILIRUB SERPL-MCNC: 0.5 MG/DL (ref 0–1.2)
BUN SERPL-MCNC: 13 MG/DL (ref 8–23)
BUN/CREAT SERPL: 15.5 (ref 7–25)
CALCIUM SPEC-SCNC: 9.7 MG/DL (ref 8.6–10.5)
CHLORIDE SERPL-SCNC: 98 MMOL/L (ref 98–107)
CHOLEST SERPL-MCNC: 191 MG/DL (ref 0–200)
CO2 SERPL-SCNC: 30.4 MMOL/L (ref 22–29)
CREAT SERPL-MCNC: 0.84 MG/DL (ref 0.76–1.27)
CREAT UR-MCNC: 94.3 MG/DL
DEPRECATED RDW RBC AUTO: 51 FL (ref 37–54)
EGFRCR SERPLBLD CKD-EPI 2021: 97.4 ML/MIN/1.73
EOSINOPHIL # BLD AUTO: 0.23 10*3/MM3 (ref 0–0.4)
EOSINOPHIL NFR BLD AUTO: 2.6 % (ref 0.3–6.2)
ERYTHROCYTE [DISTWIDTH] IN BLOOD BY AUTOMATED COUNT: 15.4 % (ref 12.3–15.4)
GLOBULIN UR ELPH-MCNC: 2.8 GM/DL
GLUCOSE SERPL-MCNC: 320 MG/DL (ref 65–99)
HBA1C MFR BLD: 10.1 % (ref 4.8–5.6)
HCT VFR BLD AUTO: 44.3 % (ref 37.5–51)
HDLC SERPL-MCNC: 37 MG/DL (ref 40–60)
HGB BLD-MCNC: 14.2 G/DL (ref 13–17.7)
IMM GRANULOCYTES # BLD AUTO: 0.02 10*3/MM3 (ref 0–0.05)
IMM GRANULOCYTES NFR BLD AUTO: 0.2 % (ref 0–0.5)
LDLC SERPL CALC-MCNC: 120 MG/DL (ref 0–100)
LDLC/HDLC SERPL: 3.12 {RATIO}
LYMPHOCYTES # BLD AUTO: 1.19 10*3/MM3 (ref 0.7–3.1)
LYMPHOCYTES NFR BLD AUTO: 13.5 % (ref 19.6–45.3)
MCH RBC QN AUTO: 29.3 PG (ref 26.6–33)
MCHC RBC AUTO-ENTMCNC: 32.1 G/DL (ref 31.5–35.7)
MCV RBC AUTO: 91.3 FL (ref 79–97)
MICROALBUMIN/CREAT UR: 31.8 MG/G (ref 0–29)
MONOCYTES # BLD AUTO: 0.59 10*3/MM3 (ref 0.1–0.9)
MONOCYTES NFR BLD AUTO: 6.7 % (ref 5–12)
NEUTROPHILS NFR BLD AUTO: 6.73 10*3/MM3 (ref 1.7–7)
NEUTROPHILS NFR BLD AUTO: 76.3 % (ref 42.7–76)
NRBC BLD AUTO-RTO: 0 /100 WBC (ref 0–0.2)
PLATELET # BLD AUTO: 297 10*3/MM3 (ref 140–450)
PMV BLD AUTO: 10.8 FL (ref 6–12)
POTASSIUM SERPL-SCNC: 5.1 MMOL/L (ref 3.5–5.2)
PROT SERPL-MCNC: 6.6 G/DL (ref 6–8.5)
RBC # BLD AUTO: 4.85 10*6/MM3 (ref 4.14–5.8)
SODIUM SERPL-SCNC: 139 MMOL/L (ref 136–145)
TRIGL SERPL-MCNC: 192 MG/DL (ref 0–150)
URATE SERPL-MCNC: 5.4 MG/DL (ref 3.4–7)
VLDLC SERPL-MCNC: 34 MG/DL (ref 5–40)
WBC NRBC COR # BLD AUTO: 8.82 10*3/MM3 (ref 3.4–10.8)

## 2025-01-15 PROCEDURE — 80061 LIPID PANEL: CPT | Performed by: PHYSICIAN ASSISTANT

## 2025-01-15 PROCEDURE — 82570 ASSAY OF URINE CREATININE: CPT | Performed by: PHYSICIAN ASSISTANT

## 2025-01-15 PROCEDURE — 83036 HEMOGLOBIN GLYCOSYLATED A1C: CPT | Performed by: PHYSICIAN ASSISTANT

## 2025-01-15 PROCEDURE — 84550 ASSAY OF BLOOD/URIC ACID: CPT | Performed by: PHYSICIAN ASSISTANT

## 2025-01-15 PROCEDURE — 85025 COMPLETE CBC W/AUTO DIFF WBC: CPT | Performed by: PHYSICIAN ASSISTANT

## 2025-01-15 PROCEDURE — 82043 UR ALBUMIN QUANTITATIVE: CPT | Performed by: PHYSICIAN ASSISTANT

## 2025-01-15 PROCEDURE — 80053 COMPREHEN METABOLIC PANEL: CPT | Performed by: PHYSICIAN ASSISTANT

## 2025-05-01 ENCOUNTER — TRANSCRIBE ORDERS (OUTPATIENT)
Dept: ADMINISTRATIVE | Facility: HOSPITAL | Age: 65
End: 2025-05-01
Payer: MEDICARE

## 2025-05-01 DIAGNOSIS — R94.31 ABNORMAL EKG: ICD-10-CM

## 2025-05-01 DIAGNOSIS — R06.02 SHORTNESS OF BREATH: Primary | ICD-10-CM

## 2025-05-01 DIAGNOSIS — I49.8 OTHER CARDIAC ARRHYTHMIA: ICD-10-CM

## 2025-06-13 ENCOUNTER — LAB REQUISITION (OUTPATIENT)
Dept: LAB | Facility: HOSPITAL | Age: 65
End: 2025-06-13
Payer: MEDICARE

## 2025-06-13 DIAGNOSIS — L97.411 NON-PRESSURE CHRONIC ULCER OF RIGHT HEEL AND MIDFOOT LIMITED TO BREAKDOWN OF SKIN: ICD-10-CM

## 2025-06-13 DIAGNOSIS — E11.621 TYPE 2 DIABETES MELLITUS WITH FOOT ULCER (CODE): ICD-10-CM

## 2025-06-13 PROCEDURE — 87070 CULTURE OTHR SPECIMN AEROBIC: CPT | Performed by: PHYSICIAN ASSISTANT

## 2025-06-13 PROCEDURE — 87186 SC STD MICRODIL/AGAR DIL: CPT | Performed by: PHYSICIAN ASSISTANT

## 2025-06-13 PROCEDURE — 87205 SMEAR GRAM STAIN: CPT | Performed by: PHYSICIAN ASSISTANT

## 2025-06-13 PROCEDURE — 87077 CULTURE AEROBIC IDENTIFY: CPT | Performed by: PHYSICIAN ASSISTANT

## 2025-06-17 LAB
BACTERIA SPEC AEROBE CULT: ABNORMAL
GRAM STN SPEC: ABNORMAL
GRAM STN SPEC: ABNORMAL

## 2025-07-28 ENCOUNTER — OFFICE VISIT (OUTPATIENT)
Dept: PODIATRY | Facility: CLINIC | Age: 65
End: 2025-07-28
Payer: MEDICARE

## 2025-07-28 VITALS
BODY MASS INDEX: 54.67 KG/M2 | SYSTOLIC BLOOD PRESSURE: 133 MMHG | TEMPERATURE: 98 F | HEART RATE: 76 BPM | DIASTOLIC BLOOD PRESSURE: 77 MMHG | WEIGHT: 315 LBS | OXYGEN SATURATION: 94 %

## 2025-07-28 DIAGNOSIS — E66.01 MORBID OBESITY: ICD-10-CM

## 2025-07-28 DIAGNOSIS — L60.0 ONYCHOCRYPTOSIS: ICD-10-CM

## 2025-07-28 DIAGNOSIS — M79.672 FOOT PAIN, BILATERAL: ICD-10-CM

## 2025-07-28 DIAGNOSIS — E11.9 DIABETES MELLITUS WITHOUT COMPLICATION: ICD-10-CM

## 2025-07-28 DIAGNOSIS — B35.1 ONYCHOMYCOSIS: ICD-10-CM

## 2025-07-28 DIAGNOSIS — M79.671 FOOT PAIN, BILATERAL: ICD-10-CM

## 2025-07-28 DIAGNOSIS — R26.2 DIFFICULTY WALKING: ICD-10-CM

## 2025-07-28 DIAGNOSIS — E11.8 DM FEET: Primary | ICD-10-CM

## 2025-07-28 PROCEDURE — 1160F RVW MEDS BY RX/DR IN RCRD: CPT | Performed by: PODIATRIST

## 2025-07-28 PROCEDURE — 1159F MED LIST DOCD IN RCRD: CPT | Performed by: PODIATRIST

## 2025-07-28 PROCEDURE — 11721 DEBRIDE NAIL 6 OR MORE: CPT | Performed by: PODIATRIST

## 2025-07-28 RX ORDER — DAPAGLIFLOZIN 5 MG/1
5 TABLET, FILM COATED ORAL DAILY
COMMUNITY
Start: 2025-02-21

## 2025-07-28 RX ORDER — GLYBURIDE 5 MG/1
5 TABLET ORAL
COMMUNITY

## 2025-07-28 NOTE — PROGRESS NOTES
[facility] - PODIATRY    Today's Date: 07/28/25    Patient Name: Niall Mak  MRN: [id number]  CSN: [id number]  PCP: Shai Azar PA, Last PCP Visit: 6/20/2025  Referring Provider: No ref. provider found    SUBJECTIVE     HPI: Niall Mak, a 65 y.o.male, presents to clinic for painful toenail:    New, Established, New Problem:  Established  Location:  Toenails  Duration:   Greater than five years  Onset:  Gradual  Nature:  sore with palpation.  Stable, worsening, improving:   stable  Aggravating factors:  Pain with shoe gear and ambulation.  Previous Treatment:  Debridement    Patient controlling diabetes via: Oral medication    Patient states their most recent blood glucose reading was 214    Patient denies any fevers, chills, nausea, vomiting, shortness of breath, nor any other constitutional signs nor symptoms.    Medical changes:  none    I have reviewed/confirmed previously documented HPI with no changes.     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     Past Surgical History:   Procedure Laterality Date    COLONOSCOPY      TONSILLECTOMY       Family History   Problem Relation Age of Onset    Cancer Father      Social History     Socioeconomic History    Marital status:    Tobacco Use    Smoking status: Never     Passive exposure: Never    Smokeless tobacco: Never   Vaping Use    Vaping status: Never Used   Substance and Sexual Activity    Alcohol use: Not Currently    Drug use: Never    Sexual activity: Not Currently     Partners: Female     Allergies   Allergen Reactions    Cucumber Extract Unknown - High Severity and Other (See Comments)    Influenza Virus Vaccine Unknown - High Severity    Sitagliptin Unknown - High Severity    [place]  Unknown - High Severity    Tomato Unknown - High Severity and Other (See Comments)    Nicotine Rash     Current Outpatient Medications   Medication Sig Dispense Refill    albuterol sulfate  (90 Base) MCG/ACT inhaler Inhale 2 puffs Every 6 (Six) Hours As Needed.      allopurinol (ZYLOPRIM) 300 MG tablet Take 1 tablet by mouth Daily.      Cetirizine HCl (ZyrTEC Allergy) 10 MG capsule Take 10 mg by mouth Daily.      Cinnamon 500 MG capsule Take 1 capsule by mouth 2 (two) times a day.      dapagliflozin (Farxiga) 5 MG tablet tablet Take 1 tablet by mouth Daily.      digoxin (LANOXIN) 250 MCG tablet Take 1 tablet by mouth Daily.      dilTIAZem CD (CARDIZEM CD) 240 MG 24 hr capsule Take 1 capsule by mouth Daily.      Eliquis 5 MG tablet tablet Take 1 tablet by mouth 2 (Two) Times a Day.      fluticasone (FLONASE) 50 MCG/ACT nasal spray Administer 1 spray into the nostril(s) as directed by provider Daily.      furosemide (LASIX) 40 MG tablet Take 1 tablet by mouth Daily.      gabapentin (NEURONTIN) 300 MG capsule Take 1 capsule by mouth 3 (Three) Times a Day.      gabapentin (NEURONTIN) 600 MG tablet Take 1 tablet by mouth 3 (Three) Times a Day.      glyburide (DIAbeta) 5 MG tablet Take 1 tablet by mouth Daily With Breakfast.      HYDROcodone-acetaminophen (NORCO) 5-325 MG per tablet Take 1 tablet by mouth Every 12 (Twelve) Hours As Needed.      metFORMIN (GLUCOPHAGE) 500 MG tablet Take 2 tablets by mouth 2 (Two) Times a Day With Meals. Take 2 tablets twice a day      metoprolol succinate XL (TOPROL-XL) 100 MG 24 hr tablet Take 1 tablet by mouth Daily.      potassium chloride 10 MEQ CR tablet Take 1 tablet by mouth Daily.      Turmeric 400 MG capsule Take 400 mg by mouth Daily.      glimepiride (AMARYL) 4 MG tablet Take 1 tablet by mouth 2 (Two) Times a Day. (Patient not taking: Reported on 7/28/2025)       No current facility-administered medications for this visit.     Review of Systems   Constitutional: Negative.     Skin:         Painful toenails   Neurological:  Positive for numbness.   All other systems reviewed and are negative.    OBJECTIVE     Vitals:    07/28/25 1542   BP: 133/77   Pulse: 76   Temp: 98 °F (36.7 °C)   SpO2: 94%     Body mass index is 54.67 kg/m².    Lab Results   Component Value Date    HGBA1C 10.10 (H) 01/15/2025       Lab Results   Component Value Date    GLUCOSE 320 (H) 01/15/2025    CALCIUM 9.7 01/15/2025     01/15/2025    K 5.1 01/15/2025    CO2 30.4 (H) 01/15/2025    CL 98 01/15/2025    BUN 13 01/15/2025    CREATININE 0.84 01/15/2025    EGFRIFAFRI >60 06/13/2022    EGFRIFNONA 94 08/01/2021    BCR 15.5 01/15/2025    ANIONGAP 10.6 01/15/2025       Patient seen in no apparent distress.      PHYSICAL EXAM:     Foot/Ankle Exam    GENERAL  Appearance:  chronically ill (Morbidly obese)  Orientation:  AAOx3  Affect:  appropriate  Gait:  antalgic  Assistance:  walker  Right shoe gear: diabetic shoe  Left shoe gear: diabetic shoe    VASCULAR     Right Foot Vascularity   Dorsalis pedis:  Absent (Palpable due to lower extremity edema)  Posterior tibial:  Absent  Skin temperature:  warm  Edema grading:  Pitting and 4+  CFT:  < 3 seconds  Pedal hair growth:  Absent  Varicosities:  severe varicosities     Left Foot Vascularity   Dorsalis pedis:  Absent (Nonpalpable due to lower extremity edema)  Posterior tibial:  Absent  Skin temperature:  warm  Edema grading:  Pitting and 4+  CFT:  < 3 seconds  Pedal hair growth:  Absent  Varicosities:  severe varicosities     NEUROLOGIC     Right Foot Neurologic   Light touch sensation: diminished  Vibratory sensation: diminished  Hot/Cold sensation: diminished  Protective Sensation using Mandan-Marie Monofilament:   Sites intact: 3  Sites tested: 10     Left Foot Neurologic   Light touch sensation: diminished  Vibratory sensation: diminished  Hot/Cold sensation:  diminished  Protective Sensation using Mandan-Marie Monofilament:   Sites intact: 2  Sites tested:  10    MUSCLE STRENGTH     Right Foot Muscle Strength   Foot dorsiflexion:  4-  Foot plantar flexion:  4-  Foot inversion:  4-  Foot eversion:  4-     Left Foot Muscle Strength   Foot dorsiflexion:  4-  Foot plantar flexion:  4-  Foot inversion:  4-  Foot eversion:  4-    RANGE OF MOTION     Right Foot Range of Motion   Foot and ankle ROM within normal limits       Left Foot Range of Motion   Foot and ankle ROM within normal limits      DERMATOLOGIC      Right Foot Dermatologic   Skin  Right foot skin is intact.   Nails  1.  Positive for elongated, onychomycosis, abnormal thickness, subungual debris and ingrown toenail.  2.  Positive for elongated, onychomycosis, abnormal thickness, subungual debris and ingrown toenail.  3.  Positive for elongated, onychomycosis, abnormal thickness, subungual debris and ingrown toenail.  4.  Positive for elongated, onychomycosis, abnormal thickness, subungual debris and ingrown toenail.  5.  Positive for elongated, onychomycosis, abnormal thickness, subungual debris and ingrown toenail.     Left Foot Dermatologic   Skin  Left foot skin is intact.   Nails  2.  Positive for elongated, onychomycosis, abnormal thickness, subungual debris and ingrown toenail.  3.  Positive for elongated, onychomycosis, abnormal thickness, subungual debris and ingrown toenail.  4.  Positive for elongated, onychomycosis, abnormally thick, subungual debris and ingrown toenail.  5.  Positive for elongated, onychomycosis, abnormally thick, subungual debris and ingrown toenail.    I have reexamined the patient the results are consistent with the previously documented exam.    ASSESSMENT/PLAN     Diagnoses and all orders for this visit:    1. DM feet (Primary)    2. Onychomycosis    3. Onychocryptosis    4. Difficulty walking    5. Diabetes mellitus without complication    6. Foot pain, bilateral    7. Morbid obesity    Comprehensive lower extremity examination and evaluation was performed.    Discussed findings  and treatment plan including risks, benefits, and treatment options with patient in detail. Patient agreed with treatment plan.    Medications and allergies reviewed.  Reviewed available blood glucose and HgB A1C lab values along with other pertinent labs.  These were discussed with the patient as to their importance of diabetic maintenance.    Toenails 1, 2, 3, 4, 5 on Right and 2, 3, 4, 5 on Left were debrided with nail nippers then filed with a Dremel nail silvia.  Patient tolerated procedure well without complications.    An After Visit Summary was printed and given to the patient at discharge, including (if requested) any available informative/educational handouts regarding diagnosis, treatment, or medications. All questions were answered to patient/family satisfaction. Should symptoms fail to improve or worsen they agree to call or return to clinic or to go to the Emergency Department. Discussed the importance of following up with any needed screening tests/labs/specialist appointments and any requested follow-up recommended by me today. Importance of maintaining follow-up discussed and patient accepts that missed appointments can delay diagnosis and potentially lead to worsening of conditions.    Return in about 9 weeks (around 9/29/2025) for Toenail Care., or sooner if acute issues arise.    I have reviewed the assessment and plan and verified the accuracy of it. No changes to assessment and plan since the information was documented. Des Ahumada DPM 07/28/25     I have dictated this note utilizing Dragon Dictation.  Please note that portions of this note were completed with a voice recognition program.  Part of this note may be an electronic transcription/translation of spoken language to printed text using the Dragon Dictation System.      This document has been electronically signed by Des Ahumada DPM on July 28, 2025 16:28 EDT

## 2025-08-04 ENCOUNTER — HOSPITAL ENCOUNTER (OUTPATIENT)
Facility: HOSPITAL | Age: 65
Discharge: HOME | End: 2025-08-04
Admitting: SPECIALIST
Payer: MEDICARE

## 2025-08-04 DIAGNOSIS — I49.8 OTHER CARDIAC ARRHYTHMIA: ICD-10-CM

## 2025-08-04 DIAGNOSIS — R06.02 SHORTNESS OF BREATH: ICD-10-CM

## 2025-08-04 DIAGNOSIS — R94.31 ABNORMAL EKG: ICD-10-CM

## 2025-08-04 PROCEDURE — 93306 TTE W/DOPPLER COMPLETE: CPT

## 2025-08-04 PROCEDURE — 25010000002 SULFUR HEXAFLUORIDE MICROSPH 60.7-25 MG RECONSTITUTED SUSPENSION: Performed by: SPECIALIST

## 2025-08-04 RX ADMIN — SULFUR HEXAFLUORIDE 4 ML: KIT at 15:38

## 2025-08-07 LAB
AORTIC DIMENSIONLESS INDEX: 0.66 (DI)
AV MEAN PRESS GRAD SYS DOP V1V2: 8.3 MMHG
AV VMAX SYS DOP: 185.1 CM/SEC
BH CV ECHO MEAS - AO MAX PG: 13.7 MMHG
BH CV ECHO MEAS - AO ROOT DIAM: 3.1 CM
BH CV ECHO MEAS - AO V2 VTI: 29.6 CM
BH CV ECHO MEAS - AVA(I,D): 2.29 CM2
BH CV ECHO MEAS - EDV(MOD-SP2): 194.7 ML
BH CV ECHO MEAS - EDV(MOD-SP4): 158.1 ML
BH CV ECHO MEAS - EF(MOD-SP2): 55.5 %
BH CV ECHO MEAS - EF(MOD-SP4): 56.9 %
BH CV ECHO MEAS - ESV(MOD-SP2): 86.6 ML
BH CV ECHO MEAS - ESV(MOD-SP4): 68.2 ML
BH CV ECHO MEAS - IVS/LVPW: 1 CM
BH CV ECHO MEAS - IVSD: 1.5 CM
BH CV ECHO MEAS - LA DIMENSION: 5.2 CM
BH CV ECHO MEAS - LAT PEAK E' VEL: 18.7 CM/SEC
BH CV ECHO MEAS - LV DIASTOLIC VOL/BSA (35-75): 49.5 CM2
BH CV ECHO MEAS - LV MAX PG: 4.4 MMHG
BH CV ECHO MEAS - LV MEAN PG: 2.2 MMHG
BH CV ECHO MEAS - LV SYSTOLIC VOL/BSA (12-30): 21.3 CM2
BH CV ECHO MEAS - LV V1 MAX: 105 CM/SEC
BH CV ECHO MEAS - LV V1 VTI: 19.5 CM
BH CV ECHO MEAS - LVIDD: 5.2 CM
BH CV ECHO MEAS - LVIDS: 3.7 CM
BH CV ECHO MEAS - LVOT AREA: 3.5 CM2
BH CV ECHO MEAS - LVOT DIAM: 2.1 CM
BH CV ECHO MEAS - LVPWD: 1.5 CM
BH CV ECHO MEAS - MED PEAK E' VEL: 12.5 CM/SEC
BH CV ECHO MEAS - MV E MAX VEL: 145 CM/SEC
BH CV ECHO MEAS - RAP SYSTOLE: 15 MMHG
BH CV ECHO MEAS - RVSP: 28 MMHG
BH CV ECHO MEAS - SV(LVOT): 67.6 ML
BH CV ECHO MEAS - SV(MOD-SP2): 108.1 ML
BH CV ECHO MEAS - SV(MOD-SP4): 89.9 ML
BH CV ECHO MEAS - SVI(LVOT): 21.2 ML/M2
BH CV ECHO MEAS - SVI(MOD-SP2): 33.8 ML/M2
BH CV ECHO MEAS - SVI(MOD-SP4): 28.1 ML/M2
BH CV ECHO MEAS - TR MAX PG: 12.8 MMHG
BH CV ECHO MEAS - TR MAX VEL: 178.6 CM/SEC
BH CV ECHO MEASUREMENTS AVERAGE E/E' RATIO: 9.29
BH CV XLRA - TDI S': 14.6 CM/SEC
LEFT ATRIUM VOLUME INDEX: 39.9 ML/M2
LV EF BIPLANE MOD: 54.9 %